# Patient Record
Sex: FEMALE | Race: WHITE | NOT HISPANIC OR LATINO | URBAN - METROPOLITAN AREA
[De-identification: names, ages, dates, MRNs, and addresses within clinical notes are randomized per-mention and may not be internally consistent; named-entity substitution may affect disease eponyms.]

---

## 2020-07-22 ENCOUNTER — ON CAMPUS - OUTPATIENT (OUTPATIENT)
Dept: URBAN - METROPOLITAN AREA HOSPITAL 77 | Facility: HOSPITAL | Age: 70
End: 2020-07-22
Payer: MEDICARE

## 2020-07-22 DIAGNOSIS — K63.5 POLYP OF COLON: ICD-10-CM

## 2020-07-22 DIAGNOSIS — Z85.038 PERSONAL HISTORY OF OTHER MALIGNANT NEOPLASM OF LARGE INTEST: ICD-10-CM

## 2020-07-22 PROCEDURE — 45385 COLONOSCOPY W/LESION REMOVAL: CPT | Performed by: INTERNAL MEDICINE

## 2021-12-09 ENCOUNTER — OFFICE VISIT (OUTPATIENT)
Dept: FAMILY MEDICINE CLINIC | Facility: CLINIC | Age: 71
End: 2021-12-09

## 2021-12-09 VITALS
TEMPERATURE: 97.1 F | RESPIRATION RATE: 18 BRPM | OXYGEN SATURATION: 96 % | HEART RATE: 88 BPM | DIASTOLIC BLOOD PRESSURE: 65 MMHG | HEIGHT: 65 IN | SYSTOLIC BLOOD PRESSURE: 125 MMHG | WEIGHT: 170 LBS | BODY MASS INDEX: 28.32 KG/M2

## 2021-12-09 DIAGNOSIS — E11.9 DM TYPE 2, GOAL HBA1C < 7% (HCC): ICD-10-CM

## 2021-12-09 DIAGNOSIS — N32.81 OAB (OVERACTIVE BLADDER): Primary | ICD-10-CM

## 2021-12-09 DIAGNOSIS — M54.42 CHRONIC LEFT-SIDED LOW BACK PAIN WITH LEFT-SIDED SCIATICA: ICD-10-CM

## 2021-12-09 DIAGNOSIS — M51.36 DDD (DEGENERATIVE DISC DISEASE), LUMBAR: ICD-10-CM

## 2021-12-09 DIAGNOSIS — G89.29 CHRONIC LEFT-SIDED LOW BACK PAIN WITH LEFT-SIDED SCIATICA: ICD-10-CM

## 2021-12-09 DIAGNOSIS — R73.03 PREDIABETES: ICD-10-CM

## 2021-12-09 DIAGNOSIS — L98.9 SKIN LESION OF FACE: ICD-10-CM

## 2021-12-09 DIAGNOSIS — E03.9 ACQUIRED HYPOTHYROIDISM: ICD-10-CM

## 2021-12-09 DIAGNOSIS — I10 HTN, GOAL BELOW 130/80: ICD-10-CM

## 2021-12-09 DIAGNOSIS — R41.89 COGNITIVE DECLINE: ICD-10-CM

## 2021-12-09 PROBLEM — E78.5 DYSLIPIDEMIA: Status: ACTIVE | Noted: 2017-10-11

## 2021-12-09 PROBLEM — M81.0 OSTEOPOROSIS: Status: ACTIVE | Noted: 2020-05-26

## 2021-12-09 PROBLEM — J30.89 ENVIRONMENTAL AND SEASONAL ALLERGIES: Status: ACTIVE | Noted: 2019-10-11

## 2021-12-09 PROBLEM — E11.42 DIABETIC PERIPHERAL NEUROPATHY: Status: ACTIVE | Noted: 2020-02-14

## 2021-12-09 PROBLEM — M54.17 LUMBOSACRAL RADICULOPATHY: Status: ACTIVE | Noted: 2020-02-14

## 2021-12-09 PROBLEM — G25.81 RESTLESS LEG SYNDROME: Status: ACTIVE | Noted: 2018-09-21

## 2021-12-09 PROBLEM — K21.9 GASTRIC REFLUX: Status: ACTIVE | Noted: 2017-03-27

## 2021-12-09 PROCEDURE — 84439 ASSAY OF FREE THYROXINE: CPT | Performed by: FAMILY MEDICINE

## 2021-12-09 PROCEDURE — 80053 COMPREHEN METABOLIC PANEL: CPT | Performed by: FAMILY MEDICINE

## 2021-12-09 PROCEDURE — 99204 OFFICE O/P NEW MOD 45 MIN: CPT | Performed by: FAMILY MEDICINE

## 2021-12-09 PROCEDURE — 36415 COLL VENOUS BLD VENIPUNCTURE: CPT | Performed by: FAMILY MEDICINE

## 2021-12-09 PROCEDURE — 83036 HEMOGLOBIN GLYCOSYLATED A1C: CPT | Performed by: FAMILY MEDICINE

## 2021-12-09 PROCEDURE — 84443 ASSAY THYROID STIM HORMONE: CPT | Performed by: FAMILY MEDICINE

## 2021-12-09 RX ORDER — OMEPRAZOLE 40 MG/1
40 CAPSULE, DELAYED RELEASE ORAL DAILY
COMMUNITY
Start: 2021-11-14 | End: 2021-12-09 | Stop reason: SDUPTHER

## 2021-12-09 RX ORDER — ROPINIROLE 0.5 MG/1
0.5 TABLET, FILM COATED ORAL
COMMUNITY
Start: 2021-11-14 | End: 2022-02-21 | Stop reason: SDUPTHER

## 2021-12-09 RX ORDER — ASPIRIN 81 MG/1
81 TABLET ORAL DAILY
COMMUNITY

## 2021-12-09 RX ORDER — MEMANTINE HYDROCHLORIDE 5 MG/1
5 TABLET ORAL 2 TIMES DAILY
Qty: 60 TABLET | Refills: 1 | Status: SHIPPED | OUTPATIENT
Start: 2021-12-09 | End: 2022-01-17 | Stop reason: SDUPTHER

## 2021-12-09 RX ORDER — VENLAFAXINE HYDROCHLORIDE 150 MG/1
150 CAPSULE, EXTENDED RELEASE ORAL 2 TIMES DAILY
COMMUNITY
Start: 2021-11-14 | End: 2022-02-21 | Stop reason: SDUPTHER

## 2021-12-09 RX ORDER — OMEPRAZOLE 40 MG/1
40 CAPSULE, DELAYED RELEASE ORAL DAILY
Qty: 90 CAPSULE | Refills: 1 | Status: SHIPPED | OUTPATIENT
Start: 2021-12-09 | End: 2022-02-21 | Stop reason: SDUPTHER

## 2021-12-09 RX ORDER — RAMIPRIL 2.5 MG/1
2.5 CAPSULE ORAL DAILY
COMMUNITY
Start: 2021-12-03 | End: 2022-02-21 | Stop reason: SDUPTHER

## 2021-12-09 RX ORDER — AMLODIPINE BESYLATE 10 MG/1
10 TABLET ORAL DAILY
COMMUNITY
Start: 2021-11-14 | End: 2022-02-21 | Stop reason: SDUPTHER

## 2021-12-09 RX ORDER — LEVOTHYROXINE SODIUM 137 UG/1
137 TABLET ORAL DAILY
COMMUNITY
Start: 2021-11-14 | End: 2022-01-17 | Stop reason: SDUPTHER

## 2021-12-09 RX ORDER — GABAPENTIN ENACARBIL 300 MG/1
1 TABLET, EXTENDED RELEASE ORAL DAILY
Qty: 10 TABLET | Refills: 0 | COMMUNITY
Start: 2021-12-09 | End: 2021-12-09 | Stop reason: SDUPTHER

## 2021-12-09 RX ORDER — MULTIVIT-MIN/IRON/FOLIC ACID/K 18-600-40
2000 CAPSULE ORAL DAILY
COMMUNITY

## 2021-12-09 RX ORDER — LANOLIN ALCOHOL/MO/W.PET/CERES
1000 CREAM (GRAM) TOPICAL DAILY
COMMUNITY

## 2021-12-09 RX ORDER — HYDROXYZINE HYDROCHLORIDE 10 MG/1
10 TABLET, FILM COATED ORAL DAILY
COMMUNITY
Start: 2021-11-14 | End: 2022-02-21 | Stop reason: SDUPTHER

## 2021-12-09 RX ORDER — CLOTRIMAZOLE AND BETAMETHASONE DIPROPIONATE 10; .64 MG/G; MG/G
1 CREAM TOPICAL 2 TIMES DAILY
Status: ON HOLD | COMMUNITY
End: 2022-04-15

## 2021-12-09 RX ORDER — SIMVASTATIN 20 MG
20 TABLET ORAL DAILY
COMMUNITY
Start: 2021-11-14 | End: 2022-02-21

## 2021-12-09 RX ORDER — GABAPENTIN ENACARBIL 300 MG/1
1 TABLET, EXTENDED RELEASE ORAL DAILY
Qty: 10 TABLET | Refills: 0 | COMMUNITY
Start: 2021-12-09 | End: 2022-01-17 | Stop reason: ALTCHOICE

## 2021-12-09 RX ORDER — CLONIDINE HYDROCHLORIDE 0.2 MG/1
0.2 TABLET ORAL DAILY
COMMUNITY
Start: 2021-11-14 | End: 2022-02-21 | Stop reason: SDUPTHER

## 2021-12-09 NOTE — PROGRESS NOTES
Venipuncture performed in R ARM by RT Carol  with good hemostasis. Patient tolerated well. 12/09/21 Ees Peters, RT

## 2021-12-09 NOTE — PROGRESS NOTES
Subjective   Radha Barbour is a 70 y.o. female.     Chief Complaint   Patient presents with   • Establish Care   • Leg Pain       History of Present Illness   The patient provided verbal consent to the use of [facility] services during today's visit.     The patient presents today to establish care.     She is taking medication as prescribed for blood pressure control, which is stable.    The patient is taking thyroid medication as prescribed. She states that her levels were last checked in 04/2021 with Dr. Prieto. Her kidney function, liver function and thyroid levels were all within normal ranges.     She reports concern for issues with her kidneys and states that she does not urinate often. She notes urinating approximately 3 times throughout the day and 3 times throughout the night. She states these symptoms started recently, within the last year. The patient believes that she saw a urologist several years ago to check bladder function.     The patient reports a history of prediabetes. Her A1c has been as high as >7.0 in 10/2020. She is not taking any diabetic medication at this time. She has never taken metformin in the past. She denies any history of glaucoma or cataracts. She undergoes yearly eye exams but she is unable to recall the name of the eye clinic.     She has a history of osteoporosis and is not taking any medications. The patient obtained a DEXA bone density scan in 05/2020, which detected osteoporosis in the left arm and hip.     The patient is taking Atarax and venlafaxine for anxiety. She reports to be doing well on the combination of medications.     She has restless leg syndrome and is taking Requip as prescribed with improvement in symptoms.     The patient utilizes hydroxyzine cream as needed for itching. She localizes a rash and states that she scratched her eyebrow off.      She reports a history of colon cancer and states that her most recent colonoscopy was done this year. The patient  underwent surgical intervention, however she is unable to recall the name of her surgeon at this time. She follows up with the [facility] as directed. There is a history of colon cancer including her mother. She is requesting a refill of omeprazole today for acid reflux symptoms. She denies any abdominal pain.     The patient reports experiencing forgetfulness and memory loss issues. She has not been evaluated for this in the past. She is doing cross word puzzles at home. She denies getting lost. The patient does not read or craft regularly but does report watching movies with her sister. There is a family history of dementia including her mother, who took Aripcept with improvement. Her mother was 84 years old when she passed away.     She tested positive for Covid virus in 02/2021. She was not hospitalized for Covid virus. She obtained her Covid vaccines through [facility] but has not received a booster Covid vaccine yet.     She complains of chronic back pain and sciatica symptoms. She describes being limited due to her pain and experiences worsening pain with prolonged standing. She complains of back pain radiating pain down her lower extremity. The patient has a surgical history of undergoing a low back surgery in 2010 however, she is unable to recall the name of her surgeon at [facility]. She reports improvement in pain postoperatively but complains of continued pain. She was taking pain medication prior to undergoing surgical intervention however, she is no longer taking any pain medication at this time.     The patient reports being evaluated in the [facility] ER once this year for weakness and other symptoms. She does not actively see a gynecologist. Her last mammogram was in 08/2021 in [facility], which was normal. She obtains yearly mammograms. The patient retired in 2004 from [employer]. Her  passed away 10 years ago and she has since moved in with her  sister. She has 2 adult children who live nearby. She does not have advanced directives in place at this time. The patient has been seen and treated at [facility] in [place]. The patient reports overeating and states she is utilizing CBD gummies regularly with improvement. She reports CBD gummies curbing her appetite. The patient quit smoking approximately 25 years ago.     Of note, she reports falling out of her bed and hitting her face approximately 2 years ago. She describes acquiring ecchymosis to her face but denies any other injuries. The patient was not evaluated in the ER at the time of injury.       The following portions of the patient's history were reviewed and updated as appropriate: allergies, current medications, past family history, past medical history, past social history, past surgical history and problem list.    Past Medical History:   Diagnosis Date   • B12 deficiency 7/8/2016    Last Assessment & Plan:  Formatting of this note might be different from the original.  Compliant and controlled with current medication B 12 supplements . Lab today B 12   • Back pain, chronic 7/8/2016    Last Assessment & Plan:  Formatting of this note might be different from the original. Will refer to PT   • Depression 7/8/2016    Last Assessment & Plan:  Formatting of this note might be different from the original.  Compliant and controlled with current medication   • Diabetic peripheral neuropathy (HCC) 2/14/2020    Last Assessment & Plan:  Formatting of this note might be different from the original. Stable   • DM type 2, goal HbA1c < 7% (HCC) 3/27/2017    Last Assessment & Plan:  Formatting of this note might be different from the original.  Compliant and controlled with current diet.llabs today A1c   • Dyslipidemia 10/11/2017    Last Assessment & Plan:  Formatting of this note might be different from the original.  Compliant and controlled with current medication/statin therapy/labs today   •  Environmental and seasonal allergies 10/11/2019   • Gastric reflux 3/27/2017   • HTN, goal below 130/80 2/10/2016    Last Assessment & Plan:  Formatting of this note might be different from the original. Compliant and controlled with current medication ramipril /labs today cmp to check renal function and electrolytes   • Hypothyroidism 2014    Last Assessment & Plan:  Formatting of this note might be different from the original. TSH low reduce levothyroxine.  Will check TSH today.   • Insomnia 2015   • Lumbosacral radiculopathy 2020    Last Assessment & Plan:  Formatting of this note might be different from the original.  Compliant and controlled with current medication Muscle relaxer   • Osteoporosis 2020    Formatting of this note is different from the original. RADIOLOGY REPORT   FACILITY:  [facility] UNIT/AGE/GENDER: [id number]  OP      AGE:69 Y          SEX:F PATIENT NAME/:  GM DERAS    1950 UNIT NUMBER:  [id number] ACCOUNT NUMBER:  [id number] ACCESSION NUMBER:  [id number]     EXAMINATION: Bone densitometry of multiple sites, lumbar spine, left hip and distal   • Restless leg syndrome 2018    Last Assessment & Plan:  Formatting of this note might be different from the original.  Compliant and controlled with current medication requip       Past Surgical History:   Procedure Laterality Date   • COLON SURGERY  2006       Family History   Problem Relation Age of Onset   • Arthritis Mother    • Colon cancer Mother    • Thyroid disease Mother        Review of Systems  All system were reviewed are are negative otherwise what is listed in the HPI.    Objective   Physical Exam  General Appearance: alert, oriented x 3, no acute distress.  Skin: warm and dry.   HEENT: Atraumatic.  pupils round and reactive to light and accommodation, oral mucosa pink and moist.  Nares patent without epistaxis.  External auditory canals are patent tympanic membranes  intact.  Neck: supple, no JVD, trachea midline.  No thyromegaly  Lungs: CTA, unlabored breathing effort.  Heart: RRR, normal S1 and S2, no S3, no rub.  Abdomen: soft, non-tender, no palpable bladder, present bowel sounds to auscultation ×4.  No guarding or rigidity.  Extremities: no clubbing, cyanosis or edema.  Good range of motion actively and passively.  Symmetric muscle strength and development  Neuro: normal speech and mental status.  Cranial nerves II through XII intact.  No anosmia. DTR 2+; proprioception intact.  No focal motor/sensory deficits.    Vitals:    12/09/21 1037   BP: 125/65   Pulse: 88   Resp: 18   Temp: 97.1 °F (36.2 °C)   SpO2: 96%     Body mass index is 28.29 kg/m².    Hemoglobin A1C   Date Value Ref Range Status   04/29/2021 6.3 (H) 4.3 - 5.6 % Final     Comment:     (note)  A1C% Reference Range:  4.3 - 5.6  Normal range  5.7 - 6.4  Pre-diabetic -increased risk for developing diabetes mellitus.  >=6.5      Diabetic -diagnostic of diabetes mellitus.     Note: For diagnosis of diabetes in individuals without unequivocal   hyperglycemia, results should be confirmed by repeat testing.  Patients with conditions that shorten erythrocyte survival, such as  recovery from acute blood loss, hemolytic anemia, kidney disease,  or the presence of unstable hemoglobins like HbSS, HbCC, and HbSC  may yield falsely decreased HbA1c test results. Iron deficiency may  yield falsely increased HbA1c test results.   02/02/2021 7.0 (H) 4.3 - 5.6 % Final     Comment:     (note)  A1C% Reference Range:  4.3 - 5.6  Normal range  5.7 - 6.4  Pre-diabetic -increased risk for developing diabetes mellitus.  >=6.5      Diabetic -diagnostic of diabetes mellitus.     Note: For diagnosis of diabetes in individuals without unequivocal   hyperglycemia, results should be confirmed by repeat testing.  Patients with conditions that shorten erythrocyte survival, such as  recovery from acute blood loss, hemolytic anemia, kidney  disease,  or the presence of unstable hemoglobins like HbSS, HbCC, and HbSC  may yield falsely decreased HbA1c test results. Iron deficiency may  yield falsely increased HbA1c test results.   10/20/2020 7.1 (H) 4.3 - 5.6 % Final     Comment:     (note)  A1C% Reference Range:  4.3 - 5.6  Normal range  5.7 - 6.4  Pre-diabetic -increased risk for developing diabetes mellitus.  >=6.5      Diabetic -diagnostic of diabetes mellitus.     Note: For diagnosis of diabetes in individuals without unequivocal   hyperglycemia, results should be confirmed by repeat testing.  Patients with conditions that shorten erythrocyte survival, such as  recovery from acute blood loss, hemolytic anemia, kidney disease,  or the presence of unstable hemoglobins like HbSS, HbCC, and HbSC  may yield falsely decreased HbA1c test results. Iron deficiency may  yield falsely increased HbA1c test results.     LDL Cholesterol    Date Value Ref Range Status   06/09/2020 225 (H) 0 - 100 mg/dL Final   02/13/2020 60 0 - 100 mg/dL Final   06/04/2019 71 0 - 100 mg/dL Final     TSH   Date Value Ref Range Status   04/29/2021 1.850 0.27 - 4.20 u(iU)/mL Final   02/02/2021 0.983 0.27 - 4.20 u(iU)/mL Final   10/20/2020 0.096 (L) 0.27 - 4.20 u(iU)/mL Final     No results found for this or any previous visit.    Assessment/Plan   Diagnoses and all orders for this visit:    1. OAB (overactive bladder) (Primary)    2. Prediabetes  -     Hemoglobin A1c  -     Comprehensive metabolic panel; Future    3. Acquired hypothyroidism  -     TSH+Free T4    4. HTN, goal below 130/80    5. DM type 2, goal HbA1c < 7% (HCC)    6. DDD (degenerative disc disease), lumbar    7. Chronic left-sided low back pain with left-sided sciatica  -     Gabapentin Enacarbil ER (Horizant) 300 MG tablet controlled-release; Take 1 tablet by mouth Daily.  Dispense: 10 tablet; Refill: 0    8. Cognitive decline    9. Skin lesion of face  -     Ambulatory Referral to Dermatology    Other orders  -      memantine (Namenda) 5 MG tablet; Take 1 tablet by mouth 2 (Two) Times a Day.  Dispense: 60 tablet; Refill: 1  -     omeprazole (priLOSEC) 40 MG capsule; Take 1 capsule by mouth Daily.  Dispense: 90 capsule; Refill: 1      1. Prediabetes.  Will obtain labs today in office including a CMP and hemoglobin A1c.    2. Hypertension.  Stable. Patient to continue taking medications as prescribed.     3. Degenerative disc disease of the lumbar spine.     4. Chronic left-sided low back pain with left-sided sciatica.  Patient to obtain an x-ray of her back for further evaluation. We discussed a referral to pain management in the future if necessary. A sample of Horizant 300 MG was dispensed to the patient today to begin taking for sciatica pain.     5. Cognitive decline.  Patient to begin taking Namenda 5 MG as prescribed. A prescription was sent to her preferred pharmacy today. We discussed incorporating additional medication as necessary and tolerated.     6. Skin lesion of face.  A referral to dermatology was placed today for further evaluation of her facial lesion.     7. Need for immunization.  Recommend that she obtain a Covid booster vaccine in the near future at a local pharmacy.     Information regarding osteoporosis was provided to the patient today. Will request office notes from the [facility].      Transcribed from ambient dictation for Dr. Ro Sawyer by Swati Ngo.   12/09/2021.   1:42 PM.

## 2021-12-10 ENCOUNTER — TELEPHONE (OUTPATIENT)
Dept: FAMILY MEDICINE CLINIC | Facility: CLINIC | Age: 71
End: 2021-12-10

## 2021-12-10 LAB
ALBUMIN SERPL-MCNC: 4.5 G/DL (ref 3.5–5.2)
ALBUMIN/GLOB SERPL: 1.3 G/DL
ALP SERPL-CCNC: 97 U/L (ref 39–117)
ALT SERPL W P-5'-P-CCNC: 15 U/L (ref 1–33)
ANION GAP SERPL CALCULATED.3IONS-SCNC: 12.6 MMOL/L (ref 5–15)
AST SERPL-CCNC: 16 U/L (ref 1–32)
BILIRUB SERPL-MCNC: 0.2 MG/DL (ref 0–1.2)
BUN SERPL-MCNC: 16 MG/DL (ref 8–23)
BUN/CREAT SERPL: 20.8 (ref 7–25)
CALCIUM SPEC-SCNC: 9.7 MG/DL (ref 8.6–10.5)
CHLORIDE SERPL-SCNC: 102 MMOL/L (ref 98–107)
CO2 SERPL-SCNC: 27.4 MMOL/L (ref 22–29)
CREAT SERPL-MCNC: 0.77 MG/DL (ref 0.57–1)
GFR SERPL CREATININE-BSD FRML MDRD: 74 ML/MIN/1.73
GLOBULIN UR ELPH-MCNC: 3.5 GM/DL
GLUCOSE SERPL-MCNC: 119 MG/DL (ref 65–99)
HBA1C MFR BLD: 6.6 % (ref 3.5–5.6)
POTASSIUM SERPL-SCNC: 4.7 MMOL/L (ref 3.5–5.2)
PROT SERPL-MCNC: 8 G/DL (ref 6–8.5)
SODIUM SERPL-SCNC: 142 MMOL/L (ref 136–145)
T4 FREE SERPL-MCNC: 1.42 NG/DL (ref 0.93–1.7)
TSH SERPL DL<=0.05 MIU/L-ACNC: 0.18 UIU/ML (ref 0.27–4.2)

## 2021-12-10 NOTE — TELEPHONE ENCOUNTER
HUB to share    Xr lumbar shows significant arthritis in lower back,  consider pain management referral for some injections which may hel with back pain    LVM informing pt

## 2021-12-23 ENCOUNTER — TELEPHONE (OUTPATIENT)
Dept: FAMILY MEDICINE CLINIC | Facility: CLINIC | Age: 71
End: 2021-12-23

## 2021-12-28 DIAGNOSIS — M54.42 CHRONIC LEFT-SIDED LOW BACK PAIN WITH LEFT-SIDED SCIATICA: ICD-10-CM

## 2021-12-28 DIAGNOSIS — M51.36 DDD (DEGENERATIVE DISC DISEASE), LUMBAR: Primary | ICD-10-CM

## 2021-12-28 DIAGNOSIS — G89.29 CHRONIC LEFT-SIDED LOW BACK PAIN WITH LEFT-SIDED SCIATICA: ICD-10-CM

## 2022-01-17 ENCOUNTER — OFFICE VISIT (OUTPATIENT)
Dept: FAMILY MEDICINE CLINIC | Facility: CLINIC | Age: 72
End: 2022-01-17

## 2022-01-17 VITALS
HEIGHT: 65 IN | HEART RATE: 78 BPM | BODY MASS INDEX: 29.66 KG/M2 | RESPIRATION RATE: 18 BRPM | SYSTOLIC BLOOD PRESSURE: 153 MMHG | WEIGHT: 178 LBS | DIASTOLIC BLOOD PRESSURE: 62 MMHG | TEMPERATURE: 97.7 F | OXYGEN SATURATION: 96 %

## 2022-01-17 DIAGNOSIS — E11.65 TYPE 2 DIABETES MELLITUS WITH HYPERGLYCEMIA, WITHOUT LONG-TERM CURRENT USE OF INSULIN: ICD-10-CM

## 2022-01-17 DIAGNOSIS — G89.29 CHRONIC LEFT-SIDED LOW BACK PAIN WITH LEFT-SIDED SCIATICA: ICD-10-CM

## 2022-01-17 DIAGNOSIS — E78.49 OTHER HYPERLIPIDEMIA: ICD-10-CM

## 2022-01-17 DIAGNOSIS — R32 URINARY INCONTINENCE, UNSPECIFIED TYPE: Primary | ICD-10-CM

## 2022-01-17 DIAGNOSIS — F41.9 ANXIETY: ICD-10-CM

## 2022-01-17 DIAGNOSIS — I10 PRIMARY HYPERTENSION: ICD-10-CM

## 2022-01-17 DIAGNOSIS — M54.42 CHRONIC LEFT-SIDED LOW BACK PAIN WITH LEFT-SIDED SCIATICA: ICD-10-CM

## 2022-01-17 DIAGNOSIS — G62.9 NEUROPATHY: ICD-10-CM

## 2022-01-17 DIAGNOSIS — E03.9 ACQUIRED HYPOTHYROIDISM: ICD-10-CM

## 2022-01-17 DIAGNOSIS — R41.89 COGNITIVE IMPAIRMENT: ICD-10-CM

## 2022-01-17 DIAGNOSIS — K21.9 GASTROESOPHAGEAL REFLUX DISEASE, UNSPECIFIED WHETHER ESOPHAGITIS PRESENT: ICD-10-CM

## 2022-01-17 PROCEDURE — 99214 OFFICE O/P EST MOD 30 MIN: CPT | Performed by: FAMILY MEDICINE

## 2022-01-17 RX ORDER — LEVOTHYROXINE SODIUM 137 UG/1
137 TABLET ORAL DAILY
Qty: 72 TABLET | Refills: 0 | Status: SHIPPED | OUTPATIENT
Start: 2022-01-17 | End: 2022-03-07 | Stop reason: SDUPTHER

## 2022-01-17 RX ORDER — METFORMIN HYDROCHLORIDE EXTENDED-RELEASE TABLETS 1000 MG/1
1000 TABLET, FILM COATED, EXTENDED RELEASE ORAL
Qty: 30 TABLET | Refills: 2 | Status: SHIPPED | OUTPATIENT
Start: 2022-01-17 | End: 2022-02-21 | Stop reason: SDUPTHER

## 2022-01-17 RX ORDER — MECLIZINE HYDROCHLORIDE 25 MG/1
25 TABLET ORAL 3 TIMES DAILY PRN
COMMUNITY
Start: 2021-12-08 | End: 2022-02-21 | Stop reason: SDUPTHER

## 2022-01-17 RX ORDER — FESOTERODINE FUMARATE 4 MG/1
4 TABLET, FILM COATED, EXTENDED RELEASE ORAL
Qty: 30 TABLET | Refills: 0 | Status: SHIPPED | OUTPATIENT
Start: 2022-01-17 | End: 2022-02-21 | Stop reason: ALTCHOICE

## 2022-01-17 RX ORDER — MEMANTINE HYDROCHLORIDE 5 MG/1
10 TABLET ORAL 2 TIMES DAILY
Qty: 60 TABLET | Refills: 2 | Status: SHIPPED | OUTPATIENT
Start: 2022-01-17 | End: 2022-02-21 | Stop reason: SDUPTHER

## 2022-01-17 RX ORDER — GABAPENTIN 300 MG/1
300 CAPSULE ORAL 3 TIMES DAILY
Qty: 90 CAPSULE | Refills: 0 | Status: SHIPPED | OUTPATIENT
Start: 2022-01-17 | End: 2022-03-15

## 2022-02-16 ENCOUNTER — TELEPHONE (OUTPATIENT)
Dept: FAMILY MEDICINE CLINIC | Facility: CLINIC | Age: 72
End: 2022-02-16

## 2022-02-16 NOTE — TELEPHONE ENCOUNTER
Rx Refill Note  Requested Prescriptions      No prescriptions requested or ordered in this encounter      Last office visit with prescribing clinician: 1/17/2022      Next office visit with prescribing clinician: 2/21/2022     Comprehensive metabolic panel (12/09/2021 11:55)  Hemoglobin A1c (12/09/2021 11:55)  LIPID PANEL (04/29/2021 13:33)         Luci Garcia, RADHA  02/16/22, 13:52 EST     For:  Gabapentin 300mg capsule  Meclizine 25mg tablet  Memantine 5mg tablet  Buproprion HCL 100mg tablet  Toviaz 4mg tablet    To [facility] mail order pharmacy

## 2022-02-21 ENCOUNTER — OFFICE VISIT (OUTPATIENT)
Dept: FAMILY MEDICINE CLINIC | Facility: CLINIC | Age: 72
End: 2022-02-21

## 2022-02-21 VITALS
OXYGEN SATURATION: 97 % | HEIGHT: 65 IN | HEART RATE: 97 BPM | SYSTOLIC BLOOD PRESSURE: 145 MMHG | RESPIRATION RATE: 18 BRPM | BODY MASS INDEX: 29.32 KG/M2 | DIASTOLIC BLOOD PRESSURE: 73 MMHG | WEIGHT: 176 LBS | TEMPERATURE: 97.7 F

## 2022-02-21 DIAGNOSIS — M25.531 WRIST PAIN, RIGHT: ICD-10-CM

## 2022-02-21 DIAGNOSIS — I95.1 ORTHOSTATIC HYPOTENSION: ICD-10-CM

## 2022-02-21 DIAGNOSIS — I10 PRIMARY HYPERTENSION: ICD-10-CM

## 2022-02-21 DIAGNOSIS — R42 VERTIGO: ICD-10-CM

## 2022-02-21 DIAGNOSIS — R60.9 EDEMA, UNSPECIFIED TYPE: ICD-10-CM

## 2022-02-21 DIAGNOSIS — S09.90XD TRAUMATIC INJURY OF HEAD, SUBSEQUENT ENCOUNTER: ICD-10-CM

## 2022-02-21 DIAGNOSIS — E03.9 ACQUIRED HYPOTHYROIDISM: Primary | ICD-10-CM

## 2022-02-21 DIAGNOSIS — E11.9 DM TYPE 2, GOAL HBA1C < 7%: ICD-10-CM
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
## 2022-02-21 DIAGNOSIS — R39.15 URGENCY OF MICTURITION: ICD-10-CM

## 2022-02-21 PROCEDURE — 84439 ASSAY OF FREE THYROXINE: CPT | Performed by: FAMILY MEDICINE

## 2022-02-21 PROCEDURE — 84443 ASSAY THYROID STIM HORMONE: CPT | Performed by: FAMILY MEDICINE

## 2022-02-21 PROCEDURE — 99214 OFFICE O/P EST MOD 30 MIN: CPT | Performed by: FAMILY MEDICINE

## 2022-02-21 PROCEDURE — 36415 COLL VENOUS BLD VENIPUNCTURE: CPT | Performed by: FAMILY MEDICINE

## 2022-02-21 RX ORDER — VENLAFAXINE HYDROCHLORIDE 150 MG/1
150 CAPSULE, EXTENDED RELEASE ORAL 2 TIMES DAILY
Qty: 180 CAPSULE | Refills: 1 | Status: SHIPPED | OUTPATIENT
Start: 2022-02-21 | End: 2022-06-28 | Stop reason: SDUPTHER

## 2022-02-21 RX ORDER — CLONIDINE HYDROCHLORIDE 0.2 MG/1
0.2 TABLET ORAL DAILY
Qty: 90 TABLET | Refills: 1 | Status: SHIPPED | OUTPATIENT
Start: 2022-02-21

## 2022-02-21 RX ORDER — ROPINIROLE 0.5 MG/1
0.5 TABLET, FILM COATED ORAL
Qty: 90 TABLET | Refills: 1 | Status: SHIPPED | OUTPATIENT
Start: 2022-02-21 | End: 2022-03-31 | Stop reason: SDUPTHER

## 2022-02-21 RX ORDER — HYDROXYZINE HYDROCHLORIDE 10 MG/1
10 TABLET, FILM COATED ORAL DAILY
Qty: 90 TABLET | Refills: 1 | Status: SHIPPED | OUTPATIENT
Start: 2022-02-21 | End: 2022-10-27 | Stop reason: SDUPTHER

## 2022-02-21 RX ORDER — RAMIPRIL 2.5 MG/1
2.5 CAPSULE ORAL DAILY
Qty: 90 CAPSULE | Refills: 1 | Status: SHIPPED | OUTPATIENT
Start: 2022-02-21 | End: 2022-03-31 | Stop reason: SDUPTHER

## 2022-02-21 RX ORDER — PRAVASTATIN SODIUM 20 MG
20 TABLET ORAL DAILY
Qty: 90 TABLET | Refills: 1 | Status: SHIPPED | OUTPATIENT
Start: 2022-02-21 | End: 2022-08-31 | Stop reason: SDUPTHER

## 2022-02-21 RX ORDER — METFORMIN HYDROCHLORIDE EXTENDED-RELEASE TABLETS 1000 MG/1
1000 TABLET, FILM COATED, EXTENDED RELEASE ORAL
Qty: 30 TABLET | Refills: 0 | Status: SHIPPED | OUTPATIENT
Start: 2022-02-21 | End: 2022-02-23 | Stop reason: SDUPTHER

## 2022-02-21 RX ORDER — MEMANTINE HYDROCHLORIDE 10 MG/1
10 TABLET ORAL 2 TIMES DAILY
Qty: 180 TABLET | Refills: 1 | Status: SHIPPED | OUTPATIENT
Start: 2022-02-21 | End: 2022-10-27 | Stop reason: SDUPTHER

## 2022-02-21 RX ORDER — MECLIZINE HYDROCHLORIDE 25 MG/1
25 TABLET ORAL 3 TIMES DAILY PRN
Qty: 180 TABLET | Refills: 0 | Status: SHIPPED | OUTPATIENT
Start: 2022-02-21 | End: 2022-03-15

## 2022-02-21 RX ORDER — OMEPRAZOLE 40 MG/1
40 CAPSULE, DELAYED RELEASE ORAL DAILY
Qty: 90 CAPSULE | Refills: 1 | Status: SHIPPED | OUTPATIENT
Start: 2022-02-21 | End: 2022-06-28 | Stop reason: SDUPTHER

## 2022-02-21 RX ORDER — AMLODIPINE BESYLATE 10 MG/1
10 TABLET ORAL DAILY
Qty: 90 TABLET | Refills: 1 | Status: SHIPPED | OUTPATIENT
Start: 2022-02-21 | End: 2022-02-25

## 2022-02-21 RX ORDER — BUPROPION HYDROCHLORIDE 100 MG/1
100 TABLET ORAL 2 TIMES DAILY
COMMUNITY
Start: 2021-12-22 | End: 2022-03-15 | Stop reason: SDUPTHER

## 2022-02-22 ENCOUNTER — TELEPHONE (OUTPATIENT)
Dept: FAMILY MEDICINE CLINIC | Facility: CLINIC | Age: 72
End: 2022-02-22

## 2022-02-22 LAB
T4 FREE SERPL-MCNC: 1.05 NG/DL (ref 0.93–1.7)
TSH SERPL DL<=0.05 MIU/L-ACNC: 4.92 UIU/ML (ref 0.27–4.2)

## 2022-02-22 NOTE — TELEPHONE ENCOUNTER
HUB to read    PA was sent for metFORMIN HCl ER (OSM) 1000MG er  Waiting on the outcome from insurance.    Preferred alternatives:  METFORMIN,METFORMIN HCL  MG TABLET,METFORMIN HCL  MG TABLETNONFORMULARY;FOR DISCOUNT,USE [id number] prefers [facility] - PREFERRED PRODUCT REQUIRED

## 2022-02-22 NOTE — TELEPHONE ENCOUNTER
Pharmacy Name:  [facility] - [facility] [address] - [phone]  - [phone]     Pharmacy representative name: HARSH    Pharmacy representative phone number: [phone]    What medication are you calling in regards to: TOVIAZ 4 MG    What question does the pharmacy have: PHARMACY HAS REQUESTED A REFILL OF THIS MEDICATION.  HUB COULD NOT FIND IT ON THE MEDICATION LIST BUT THE PHARMACY SAYS THEY HAVE FILLED THIS BEFORE.  PLEASE ADVISE     Who is the provider that prescribed the medication: DR DORSEY

## 2022-02-22 NOTE — TELEPHONE ENCOUNTER
HUB to read    PA for METFORMIN HCl ER (OSM) 1000MG was denied.     Insurance requires the patient to have tried Glucophage XR tablet and regular Metformin tablet.         The drug you asked for is non-formulary (not on [facility] list of preferred drugs). Before the drug can be covered, we need more information. Please ask your prescriber to explain to [facility] why the preferred drugs have not worked for your medical condition and/or would have bad side effects. If you have not tried the preferred drugs, including metformin ER tablet extended release 24 hr( generic Glucophage XR), and metformin tablet, please talk to your health care provider about prescribing one of these for you. Some preferred drugs may require an additional review and approval by [facility]. Additionally, some alternative drugs listed may be the same drugs with different strengths or forms. Under certain cases, [facility] may only require trial and failure of one strength or form of that drug. This determination was based on the [facility] [employer] Non-Formulary Exceptions Coverage Policy.

## 2022-02-23 DIAGNOSIS — E11.9 DM TYPE 2, GOAL HBA1C < 7%: ICD-10-CM

## 2022-02-23 RX ORDER — METFORMIN HYDROCHLORIDE EXTENDED-RELEASE TABLETS 1000 MG/1
1000 TABLET, FILM COATED, EXTENDED RELEASE ORAL
Qty: 90 TABLET | Refills: 1 | Status: SHIPPED | OUTPATIENT
Start: 2022-02-23 | End: 2022-02-23 | Stop reason: CLARIF

## 2022-02-23 RX ORDER — METFORMIN HYDROCHLORIDE EXTENDED-RELEASE TABLETS 1000 MG/1
1000 TABLET, FILM COATED, EXTENDED RELEASE ORAL
Qty: 90 TABLET | Refills: 1 | Status: SHIPPED | OUTPATIENT
Start: 2022-02-23 | End: 2022-03-01 | Stop reason: CLARIF

## 2022-02-24 ENCOUNTER — TELEPHONE (OUTPATIENT)
Dept: FAMILY MEDICINE CLINIC | Facility: CLINIC | Age: 72
End: 2022-02-24

## 2022-02-24 NOTE — TELEPHONE ENCOUNTER
Caller: Radha Barbour    Relationship to patient: Self    Best call back number: [phone]    Patient is needing: PATIENT CALLED IN AND SAID SOMEONE FROM THE OFFICE CALLED HER YESTERDAY EVENING BUT SHE DOES NOT KNOW WHO. SHE SAID IT WAS NOT ABOUT THE MEDICATION, BUT WONDERED IF HER BLOOD WORK RESULTS FROM 2/21/22 WHERE IN? PLEASE CALL PATIENT AND ADVISE

## 2022-02-25 ENCOUNTER — CONSULT (OUTPATIENT)
Dept: CARDIOLOGY | Facility: CLINIC | Age: 72
End: 2022-02-25

## 2022-02-25 VITALS
SYSTOLIC BLOOD PRESSURE: 142 MMHG | DIASTOLIC BLOOD PRESSURE: 60 MMHG | WEIGHT: 179 LBS | HEIGHT: 65 IN | HEART RATE: 61 BPM | OXYGEN SATURATION: 97 % | BODY MASS INDEX: 29.82 KG/M2

## 2022-02-25 DIAGNOSIS — R42 DIZZINESS: ICD-10-CM

## 2022-02-25 DIAGNOSIS — I20.9 ANGINA PECTORIS: ICD-10-CM

## 2022-02-25 DIAGNOSIS — I95.1 AUTONOMIC ORTHOSTATIC HYPOTENSION: Primary | ICD-10-CM

## 2022-02-25 DIAGNOSIS — E11.42 DIABETIC PERIPHERAL NEUROPATHY: ICD-10-CM

## 2022-02-25 DIAGNOSIS — I10 PRIMARY HYPERTENSION: ICD-10-CM

## 2022-02-25 DIAGNOSIS — E78.5 DYSLIPIDEMIA: ICD-10-CM

## 2022-02-25 DIAGNOSIS — R00.2 PALPITATIONS: ICD-10-CM

## 2022-02-25 DIAGNOSIS — R06.02 SOB (SHORTNESS OF BREATH): ICD-10-CM

## 2022-02-25 PROCEDURE — 93000 ELECTROCARDIOGRAM COMPLETE: CPT | Performed by: INTERNAL MEDICINE

## 2022-02-25 PROCEDURE — 99214 OFFICE O/P EST MOD 30 MIN: CPT | Performed by: INTERNAL MEDICINE

## 2022-02-25 RX ORDER — AMLODIPINE BESYLATE 5 MG/1
5 TABLET ORAL DAILY
Qty: 90 TABLET | Refills: 3 | COMMUNITY
Start: 2022-02-25 | End: 2022-06-02

## 2022-02-25 NOTE — PROGRESS NOTES
Cardiology Office Visit      Encounter Date:  02/25/2022    Patient ID:   Radha Barbour is a 71 y.o. female.    Reason For Followup:  Dizziness  Orthostasis  Hypertension  Frequent falls    Brief Clinical History:  Dear Ro Smith MD    I had the pleasure of seeing Radha Barbour today. As you are well aware, this is a 71 y.o. female significant for history of hypertension hyperlipidemia hypothyroidism history of diabetes presented with symptoms of dizziness fatigue weakness frequent multiple falls    Interval History:  Prior history of dizziness but progressively getting worse  Worsening symptoms of orthostasis  Prior history of vertigo but that is resolved now she is having more lightheadedness and dizziness with frequent falls  Unsteady gait  Currently on same medications for blood pressure for last several years including high-dose Norvasc clonidine and low-dose ramipril  Orthostatic dizziness and presyncope  Frequent falls  Lower extremity edema  Shortness of breath and dyspnea on exertion  No palpitations  No prior cardiac work-up  Assessment & Plan    Impressions:  Dizziness  Orthostasis  Hypertension  Frequent falls  Diabetes mellitus  Hypothyroidism  Lower extremity edema  Shortness of breath  Dyspnea on exertion  Hyperlipidemia    Recommendations:  Positive orthostatic hypotension  Multiple complaints  Decrease the dose of Norvasc from 10 mg to 5 mg p.o. once a day  Eventually consider stopping the clonidine and switching to other medications if needed  Risk benefits and alternatives for treatment for orthostatic hypotension reviewed and discussed with patient  First try conservative measures including MARGAUX hoses and abdominal binder  Check an echocardiogram to assess the LV systolic function rule out any significant cardiomyopathy contributing to patient's symptoms of shortness of breath dyspnea on exertion and orthostatic hypotension  Schedule patient for a myocardial perfusion study for  "further stratification for symptoms of dyspnea on exertion  Patient is currently being evaluated by primary care physician with a CT of the head for further evaluation  Prior ENT evaluation with no significant abnormality palpation  Discussed with patient and family  Prior work-up and labs reviewed and discussed with the patient  Follow-up in office in 2 months    Objective:    Vitals:  Vitals:    02/25/22 1059   BP: 142/60   Pulse: 61   SpO2: 97%   Weight: 81.2 kg (179 lb)   Height: 165.1 cm (65\")       Physical Exam:    General: Alert, cooperative, no distress, appears stated age  Head:  Normocephalic, atraumatic, mucous membranes moist  Eyes:  Conjunctiva/corneas clear, EOM's intact     Neck:  Supple,  no adenopathy;      Lungs: Clear to auscultation bilaterally, no wheezes rhonchi rales are noted  Chest wall: No tenderness  Heart::  Regular rate and rhythm, S1 and S2 normal, no murmur, rub or gallop  Abdomen: Soft, non-tender, nondistended bowel sounds active  Extremities: No cyanosis, clubbing, or edema  Pulses: 2+ and symmetric all extremities  Skin:  No rashes or lesions  Neuro/psych: A&O x3. CN II through XII are grossly intact with appropriate affect      Allergies:  No Known Allergies    Medication Review:     Current Outpatient Medications:   •  aspirin 81 MG EC tablet, Take 81 mg by mouth Daily., Disp: , Rfl:   •  buPROPion (WELLBUTRIN) 100 MG tablet, 100 mg 2 (Two) Times a Day., Disp: , Rfl:   •  CBD oil (cannabidiol) capsule, Take 25 each by mouth Daily. gummies, Disp: , Rfl:   •  cloNIDine (CATAPRES) 0.2 MG tablet, Take 1 tablet by mouth Daily., Disp: 90 tablet, Rfl: 1  •  gabapentin (NEURONTIN) 300 MG capsule, Take 1 capsule by mouth 3 (Three) Times a Day., Disp: 90 capsule, Rfl: 0  •  hydrOXYzine (ATARAX) 10 MG tablet, Take 1 tablet by mouth Daily., Disp: 90 tablet, Rfl: 1  •  levothyroxine (SYNTHROID, LEVOTHROID) 137 MCG tablet, Take 1 tablet by mouth Daily. Take 1 tablet daily Sunday to fri, Disp: " 72 tablet, Rfl: 0  •  meclizine (ANTIVERT) 25 MG tablet, Take 1 tablet by mouth 3 (Three) Times a Day As Needed for Dizziness., Disp: 180 tablet, Rfl: 0  •  memantine (Namenda) 10 MG tablet, Take 1 tablet by mouth 2 (Two) Times a Day., Disp: 180 tablet, Rfl: 1  •  Mirabegron ER (Myrbetriq) 25 MG tablet sustained-release 24 hour 24 hr tablet, Take 1 tablet by mouth Daily., Disp: 90 tablet, Rfl: 1  •  Misc Natural Products (ENERGY SUPPORT PO), Take 1 tablet/day by mouth. , Disp: , Rfl:   •  Nutritional Supplements (KETO PO), Take 2 tablet/day by mouth., Disp: , Rfl:   •  omeprazole (priLOSEC) 40 MG capsule, Take 1 capsule by mouth Daily., Disp: 90 capsule, Rfl: 1  •  ramipril (ALTACE) 2.5 MG capsule, Take 1 capsule by mouth Daily., Disp: 90 capsule, Rfl: 1  •  rOPINIRole (REQUIP) 0.5 MG tablet, Take 1 tablet by mouth every night at bedtime., Disp: 90 tablet, Rfl: 1  •  venlafaxine XR (EFFEXOR-XR) 150 MG 24 hr capsule, Take 1 capsule by mouth 2 (Two) Times a Day., Disp: 180 capsule, Rfl: 1  •  vitamin B-12 (CYANOCOBALAMIN) 1000 MCG tablet, Take 1,000 mcg by mouth Daily., Disp: , Rfl:   •  Vitamin D, Cholecalciferol, 50 MCG (2000 UT) capsule, Take 2,000 Units by mouth Daily., Disp: , Rfl:   •  amLODIPine (NORVASC) 5 MG tablet, Take 1 tablet by mouth Daily., Disp: 90 tablet, Rfl: 3  •  clotrimazole-betamethasone (LOTRISONE) 1-0.05 % cream, Apply 1 application topically to the appropriate area as directed 2 (Two) Times a Day. Apply above R eyebrow PRN rash, Disp: , Rfl:   •  metFORMIN (FORTAMET) 1000 MG (OSM) 24 hr tablet, Take 1 tablet by mouth Daily With Breakfast., Disp: 90 tablet, Rfl: 1  •  pravastatin (Pravachol) 20 MG tablet, Take 1 tablet by mouth Daily., Disp: 90 tablet, Rfl: 1    Family History:  Family History   Problem Relation Age of Onset   • Arthritis Mother    • Colon cancer Mother    • Thyroid disease Mother    • Stroke Mother        Past Medical History:  Past Medical History:   Diagnosis Date   •  B12 deficiency 7/8/2016    Last Assessment & Plan:  Formatting of this note might be different from the original.  Compliant and controlled with current medication B 12 supplements . Lab today B 12   • Back pain, chronic 7/8/2016    Last Assessment & Plan:  Formatting of this note might be different from the original. Will refer to PT   • Depression 7/8/2016    Last Assessment & Plan:  Formatting of this note might be different from the original.  Compliant and controlled with current medication   • Diabetic peripheral neuropathy (HCC) 2/14/2020    Last Assessment & Plan:  Formatting of this note might be different from the original. Stable   • DM type 2, goal HbA1c < 7% (HCC) 3/27/2017    Last Assessment & Plan:  Formatting of this note might be different from the original.  Compliant and controlled with current diet.llabs today A1c   • Dyslipidemia 10/11/2017    Last Assessment & Plan:  Formatting of this note might be different from the original.  Compliant and controlled with current medication/statin therapy/labs today   • Environmental and seasonal allergies 10/11/2019   • Gastric reflux 3/27/2017   • HTN, goal below 130/80 2/10/2016    Last Assessment & Plan:  Formatting of this note might be different from the original. Compliant and controlled with current medication ramipril /labs today cmp to check renal function and electrolytes   • Hyperlipidemia    • Hypothyroidism 5/20/2014    Last Assessment & Plan:  Formatting of this note might be different from the original. TSH low reduce levothyroxine.  Will check TSH today.   • Insomnia 1/16/2015   • Lumbosacral radiculopathy 2/14/2020    Last Assessment & Plan:  Formatting of this note might be different from the original.  Compliant and controlled with current medication Muscle relaxer   • Osteoporosis 5/26/2020    Formatting of this note is different from the original. RADIOLOGY REPORT   FACILITY:  [facility]/AGE/GENDER: MARTÍNCMACLRK  OP       AGE:69 Y          SEX:F PATIENT NAME/:  GM DERAS    1950 UNIT NUMBER:  [id number] ACCOUNT NUMBER:  [id number] ACCESSION NUMBER:  [id number]     EXAMINATION: Bone densitometry of multiple sites, lumbar spine, left hip and distal   • Restless leg syndrome 2018    Last Assessment & Plan:  Formatting of this note might be different from the original.  Compliant and controlled with current medication requip       Past surgical History:  Past Surgical History:   Procedure Laterality Date   • COLON SURGERY         Social History:  Social History     Socioeconomic History   • Marital status:    Tobacco Use   • Smoking status: Former Smoker     Packs/day: 2.00     Years: 20.00     Pack years: 40.00     Types: Cigarettes     Quit date:      Years since quittin.1   • Smokeless tobacco: Never Used   Vaping Use   • Vaping Use: Never used   Substance and Sexual Activity   • Alcohol use: Yes     Comment: socially   • Drug use: Never     Comment: CBD oils   • Sexual activity: Defer       Review of Systems:  The following systems were reviewed as they relate to the cardiovascular system: Constitutional, Eyes, ENT, Cardiovascular, Respiratory, Gastrointestinal, Integumentary, Neurological, Psychiatric, Hematologic, Endocrine, Musculoskeletal, and Genitourinary. The pertinent cardiovascular findings are reported above with all other cardiovascular points within those systems being negative.    Diagnostic Study Review:     Current Electrocardiogram:    ECG 12 Lead    Date/Time: 2022 2:08 PM  Performed by: Laila Alvarez MD  Authorized by: Laila Alvarez MD   Comparison: compared with previous ECG   Similar to previous ECG  Rhythm: sinus rhythm  Rate: normal  BPM: 63  Conduction: conduction normal  QRS axis: normal  Other findings: non-specific ST-T wave changes    Clinical impression: abnormal EKG              NOTE: The following portions of the patient's history  were reviewed and updated this visit as appropriate: allergies, current medications, past family history, past medical history, past social history, past surgical history and problem list.

## 2022-02-28 NOTE — TELEPHONE ENCOUNTER
THYROID WAS MILDLY ABNORMAL  RECOMMEND TAKING 2 THYROID PILLS ON SAT AND SUND, CONTINUE 1 PILL Monday TO FRIDAY

## 2022-03-01 ENCOUNTER — TELEPHONE (OUTPATIENT)
Dept: FAMILY MEDICINE CLINIC | Facility: CLINIC | Age: 72
End: 2022-03-01

## 2022-03-01 RX ORDER — METFORMIN HYDROCHLORIDE 1000 MG/1
1000 TABLET, FILM COATED, EXTENDED RELEASE ORAL
Qty: 90 TABLET | Refills: 1 | Status: SHIPPED | OUTPATIENT
Start: 2022-03-01 | End: 2022-03-03 | Stop reason: SDUPTHER

## 2022-03-01 NOTE — TELEPHONE ENCOUNTER
Ask pt/daughter  Tommie was too expenxive and I have myrbetriq .  Bupropion was orderd in February by another provider. Plsverify who ordered that

## 2022-03-01 NOTE — TELEPHONE ENCOUNTER
Caller: Radha Barbour    Relationship: Self    Best call back number: [phone] (M)    What medications are you currently taking:   Current Outpatient Medications on File Prior to Visit   Medication Sig Dispense Refill   • amLODIPine (NORVASC) 5 MG tablet Take 1 tablet by mouth Daily. 90 tablet 3   • aspirin 81 MG EC tablet Take 81 mg by mouth Daily.     • buPROPion (WELLBUTRIN) 100 MG tablet 100 mg 2 (Two) Times a Day.     • CBD oil (cannabidiol) capsule Take 25 each by mouth Daily. gummies     • cloNIDine (CATAPRES) 0.2 MG tablet Take 1 tablet by mouth Daily. 90 tablet 1   • clotrimazole-betamethasone (LOTRISONE) 1-0.05 % cream Apply 1 application topically to the appropriate area as directed 2 (Two) Times a Day. Apply above R eyebrow PRN rash     • gabapentin (NEURONTIN) 300 MG capsule Take 1 capsule by mouth 3 (Three) Times a Day. 90 capsule 0   • hydrOXYzine (ATARAX) 10 MG tablet Take 1 tablet by mouth Daily. 90 tablet 1   • levothyroxine (SYNTHROID, LEVOTHROID) 137 MCG tablet Take 1 tablet by mouth Daily. Take 1 tablet daily Sunday to fri 72 tablet 0   • meclizine (ANTIVERT) 25 MG tablet Take 1 tablet by mouth 3 (Three) Times a Day As Needed for Dizziness. 180 tablet 0   • memantine (Namenda) 10 MG tablet Take 1 tablet by mouth 2 (Two) Times a Day. 180 tablet 1   • metFORMIN (FORTAMET) 1000 MG (OSM) 24 hr tablet Take 1 tablet by mouth Daily With Breakfast. 90 tablet 1   • Mirabegron ER (Myrbetriq) 25 MG tablet sustained-release 24 hour 24 hr tablet Take 1 tablet by mouth Daily. 90 tablet 1   • Misc Natural Products (ENERGY SUPPORT PO) Take 1 tablet/day by mouth.      • Nutritional Supplements (KETO PO) Take 2 tablet/day by mouth.     • omeprazole (priLOSEC) 40 MG capsule Take 1 capsule by mouth Daily. 90 capsule 1   • pravastatin (Pravachol) 20 MG tablet Take 1 tablet by mouth Daily. 90 tablet 1   • ramipril (ALTACE) 2.5 MG capsule Take 1 capsule by mouth Daily. 90 capsule 1   • rOPINIRole  (REQUIP) 0.5 MG tablet Take 1 tablet by mouth every night at bedtime. 90 tablet 1   • venlafaxine XR (EFFEXOR-XR) 150 MG 24 hr capsule Take 1 capsule by mouth 2 (Two) Times a Day. 180 capsule 1   • vitamin B-12 (CYANOCOBALAMIN) 1000 MCG tablet Take 1,000 mcg by mouth Daily.     • Vitamin D, Cholecalciferol, 50 MCG (2000 UT) capsule Take 2,000 Units by mouth Daily.       No current facility-administered medications on file prior to visit.          When did you start taking these medications:     Which medication are you concerned about: metFORMIN (FORTAMET) 1000 MG (OSM) 24 hr tablet    Who prescribed you this medication:     What are your concerns: PATIENT STATES WAS TOLD BY PHARMACY INSURANCE WILL NOT COVER THE BRAND NAME OF THIS MEDICATION    How long have you had these concerns:

## 2022-03-02 NOTE — TELEPHONE ENCOUNTER
HUB to share    Ask pt/daughter  Tommie was too expenxive and I have myrbetriq .  Bupropion was orderd in February by another provider. Plsverify who ordered that    LVM asking pt/ dtr to verify current meds and who last filled the Bupropion?

## 2022-03-02 NOTE — TELEPHONE ENCOUNTER
Please resend the Metformin for 30 day supply, Insurance will not pay for 90 day supply.       [employer] has approved a 30 day supply for the drug listed above but denied your request for a 90 day supply. The drug you requested is a specialty drug. Your Prescription Drug Guide says that specialty drugs are limited to a 30-day supply. You can view your PDG online at [employer]SearchResources. A full list of pharmacies is also available at The Pharmacy Finder Tool at [email or web address]/ or you may call customer [facility] at [phone] (TTY: 71), 8 a.m. 8 p.m. EST, seven days a week for a full list of in-network pharmacies. If receiving your drugs via mail-order delivery is more convenient for you, you can visit our list of in-network mail-order pharmacies at [email or web address] (30-day supply limit on some specialty drugs also applies to mail-order). [employer] has approved coverage for the drug listed above, up to a 30-day supply per fill, under your Part D benefit for/through 12/31/2022.

## 2022-03-03 RX ORDER — METFORMIN HYDROCHLORIDE 1000 MG/1
1000 TABLET, FILM COATED, EXTENDED RELEASE ORAL
Qty: 30 TABLET | Refills: 5 | Status: SHIPPED | OUTPATIENT
Start: 2022-03-03 | End: 2022-03-11 | Stop reason: CLARIF

## 2022-03-04 NOTE — TELEPHONE ENCOUNTER
Fax rec'd from [facility]es[facility]    Metformin ER 500mg (AB3) not covered.  [facility]us says Metfromin ER (AB1) (glucophage ER)  Is the pref alternative

## 2022-03-05 ENCOUNTER — HOSPITAL ENCOUNTER (OUTPATIENT)
Dept: CT IMAGING | Facility: HOSPITAL | Age: 72
Discharge: HOME | End: 2022-03-05
Admitting: FAMILY MEDICINE

## 2022-03-05 DIAGNOSIS — S09.90XD TRAUMATIC INJURY OF HEAD, SUBSEQUENT ENCOUNTER: ICD-10-CM

## 2022-03-05 PROCEDURE — 70450 CT HEAD/BRAIN W/O DYE: CPT

## 2022-03-07 RX ORDER — LEVOTHYROXINE SODIUM 137 UG/1
137 TABLET ORAL DAILY
Qty: 90 TABLET | Refills: 0 | Status: SHIPPED | OUTPATIENT
Start: 2022-03-07 | End: 2022-03-15

## 2022-03-14 ENCOUNTER — TELEPHONE (OUTPATIENT)
Dept: FAMILY MEDICINE CLINIC | Facility: CLINIC | Age: 72
End: 2022-03-14

## 2022-03-14 NOTE — TELEPHONE ENCOUNTER
Rx Refill Note  Requested Prescriptions     Pending Prescriptions Disp Refills   • meclizine (ANTIVERT) 25 MG tablet [Pharmacy Med Name: MECLIZINE HYDROCHLORIDE 25 MG Tablet] 180 tablet 0     Sig: TAKE 1 TABLET THREE TIMES DAILY AS NEEDED FOR DIZZINESS   • levothyroxine (SYNTHROID, LEVOTHROID) 137 MCG tablet [Pharmacy Med Name: LEVOTHYROXINE SODIUM 137 MCG Tablet] 72 tablet      Sig: TAKE 1 TABLET BY MOUTH DAILY SUNDAY THROUGH FRIDAY   • gabapentin (NEURONTIN) 300 MG capsule [Pharmacy Med Name: GABAPENTIN 300 MG Capsule] 90 capsule 0     Sig: TAKE 1 CAPSULE THREE TIMES DAILY      Last office visit with prescribing clinician: 2/21/2022      Next office visit with prescribing clinician: 3/31/2022     TSH+Free T4 (02/21/2022 12:26)         Ese Peters, RT  03/14/22, 15:35 EDT

## 2022-03-14 NOTE — TELEPHONE ENCOUNTER
DIMITRI WITH [facility] IS CALLING IN TO REQUEST A CALL FROM DR DORSEY OR STAFF TO DISCUSS AN ORDER THAT WAS SENT OVER FOR PATIENT ON 03/04/22    DIMITRI CALL BACK  [phone]

## 2022-03-15 RX ORDER — LEVOTHYROXINE SODIUM 137 UG/1
TABLET ORAL
Qty: 72 TABLET | Refills: 1 | Status: ON HOLD | OUTPATIENT
Start: 2022-03-15 | End: 2022-04-15

## 2022-03-15 RX ORDER — GABAPENTIN 300 MG/1
CAPSULE ORAL
Qty: 90 CAPSULE | Refills: 0 | Status: ON HOLD | OUTPATIENT
Start: 2022-03-15 | End: 2022-04-15

## 2022-03-15 RX ORDER — MECLIZINE HYDROCHLORIDE 25 MG/1
TABLET ORAL
Qty: 180 TABLET | Refills: 0 | Status: ON HOLD | OUTPATIENT
Start: 2022-03-15 | End: 2022-04-15

## 2022-03-15 RX ORDER — BUPROPION HYDROCHLORIDE 100 MG/1
100 TABLET ORAL 2 TIMES DAILY
Qty: 180 TABLET | Refills: 0 | Status: SHIPPED | OUTPATIENT
Start: 2022-03-15 | End: 2022-06-14 | Stop reason: SDUPTHER

## 2022-03-15 NOTE — TELEPHONE ENCOUNTER
metformin ER tablet extended release 24 hr( generic Glucophage XR), and metformin tablet    The regular metformin that you sent in last should be ok.

## 2022-03-15 NOTE — TELEPHONE ENCOUNTER
Incoming Refill Request      Medication requested (name and dose): Bupropion Hcl 100mg    Pharmacy where request should be sent: [facility]    Additional details provided by patient: n/a    Best call back number:     Does the patient have less than a 3 day supply:  [] Yes  [x] No    Carrie Meek, Jessed Rep  03/15/22, 08:05 EDT

## 2022-03-15 NOTE — TELEPHONE ENCOUNTER
Rx Refill Note  Requested Prescriptions     Pending Prescriptions Disp Refills   • buPROPion (WELLBUTRIN) 100 MG tablet       Si tablet 2 (Two) Times a Day.      Last office visit with prescribing clinician: 2022      Next office visit with prescribing clinician: 3/31/2022     Comprehensive metabolic panel (2021 11:55)         Luci Garcia, RADHA  03/15/22, 08:54 EDT

## 2022-03-24 ENCOUNTER — HOSPITAL ENCOUNTER (OUTPATIENT)
Dept: CARDIOLOGY | Facility: HOSPITAL | Age: 72
Discharge: HOME | End: 2022-03-24

## 2022-03-24 VITALS
SYSTOLIC BLOOD PRESSURE: 165 MMHG | DIASTOLIC BLOOD PRESSURE: 56 MMHG | WEIGHT: 179 LBS | BODY MASS INDEX: 29.82 KG/M2 | HEART RATE: 69 BPM | HEIGHT: 65 IN

## 2022-03-24 DIAGNOSIS — R06.02 SOB (SHORTNESS OF BREATH): ICD-10-CM

## 2022-03-24 DIAGNOSIS — I20.9 ANGINA PECTORIS: ICD-10-CM

## 2022-03-24 DIAGNOSIS — R00.2 PALPITATIONS: ICD-10-CM

## 2022-03-24 PROCEDURE — 0 TECHNETIUM SESTAMIBI: Performed by: INTERNAL MEDICINE

## 2022-03-24 PROCEDURE — 93017 CV STRESS TEST TRACING ONLY: CPT

## 2022-03-24 PROCEDURE — 93016 CV STRESS TEST SUPVJ ONLY: CPT | Performed by: INTERNAL MEDICINE

## 2022-03-24 PROCEDURE — 78452 HT MUSCLE IMAGE SPECT MULT: CPT | Performed by: INTERNAL MEDICINE

## 2022-03-24 PROCEDURE — 93018 CV STRESS TEST I&R ONLY: CPT | Performed by: INTERNAL MEDICINE

## 2022-03-24 PROCEDURE — 25010000002 REGADENOSON 0.4 MG/5ML SOLUTION: Performed by: INTERNAL MEDICINE

## 2022-03-24 PROCEDURE — A9500 TC99M SESTAMIBI: HCPCS | Performed by: INTERNAL MEDICINE

## 2022-03-24 PROCEDURE — 78452 HT MUSCLE IMAGE SPECT MULT: CPT

## 2022-03-24 PROCEDURE — 93306 TTE W/DOPPLER COMPLETE: CPT

## 2022-03-24 RX ADMIN — TECHNETIUM TC 99M SESTAMIBI 1 DOSE: 1 INJECTION INTRAVENOUS at 12:10

## 2022-03-24 RX ADMIN — TECHNETIUM TC 99M SESTAMIBI 1 DOSE: 1 INJECTION INTRAVENOUS at 10:42

## 2022-03-24 RX ADMIN — REGADENOSON 0.4 MG: 0.08 INJECTION, SOLUTION INTRAVENOUS at 12:11

## 2022-03-25 DIAGNOSIS — R94.30 ABNORMAL RESULTS OF CARDIOVASCULAR FUNCTION STUDIES: ICD-10-CM

## 2022-03-25 DIAGNOSIS — R06.02 SOB (SHORTNESS OF BREATH): ICD-10-CM

## 2022-03-25 DIAGNOSIS — I20.9 ANGINA PECTORIS: Primary | ICD-10-CM

## 2022-03-25 LAB
ASCENDING AORTA: 3.3 CM
BH CV ECHO MEAS - ACS: 1.05 CM
BH CV ECHO MEAS - AI P1/2T: 417 MSEC
BH CV ECHO MEAS - AO MAX PG: 26.5 MMHG
BH CV ECHO MEAS - AO MEAN PG: 14.2 MMHG
BH CV ECHO MEAS - AO ROOT DIAM: 3.7 CM
BH CV ECHO MEAS - AO V2 MAX: 257.1 CM/SEC
BH CV ECHO MEAS - AO V2 VTI: 64 CM
BH CV ECHO MEAS - AVA(I,D): 1.44 CM2
BH CV ECHO MEAS - EDV(CUBED): 127.1 ML
BH CV ECHO MEAS - EDV(MOD-SP2): 109.5 ML
BH CV ECHO MEAS - EDV(MOD-SP4): 117.9 ML
BH CV ECHO MEAS - EF(MOD-BP): 55 %
BH CV ECHO MEAS - EF(MOD-SP2): 55.3 %
BH CV ECHO MEAS - EF(MOD-SP4): 51 %
BH CV ECHO MEAS - ESV(CUBED): 55.6 ML
BH CV ECHO MEAS - ESV(MOD-SP2): 48.9 ML
BH CV ECHO MEAS - ESV(MOD-SP4): 57.8 ML
BH CV ECHO MEAS - FS: 24.1 %
BH CV ECHO MEAS - IVS/LVPW: 1.54 CM
BH CV ECHO MEAS - IVSD: 1.44 CM
BH CV ECHO MEAS - LA DIMENSION(2D): 4.4 CM
BH CV ECHO MEAS - LA DIMENSION: 4.2 CM
BH CV ECHO MEAS - LAT PEAK E' VEL: 7 CM/SEC
BH CV ECHO MEAS - LV DIASTOLIC VOL/BSA (35-75): 62.5 CM2
BH CV ECHO MEAS - LV MASS(C)D: 232.2 GRAMS
BH CV ECHO MEAS - LV MAX PG: 3.7 MMHG
BH CV ECHO MEAS - LV MEAN PG: 1.95 MMHG
BH CV ECHO MEAS - LV SYSTOLIC VOL/BSA (12-30): 30.6 CM2
BH CV ECHO MEAS - LV V1 MAX: 96.7 CM/SEC
BH CV ECHO MEAS - LV V1 VTI: 24.2 CM
BH CV ECHO MEAS - LVIDD: 5 CM
BH CV ECHO MEAS - LVIDS: 3.8 CM
BH CV ECHO MEAS - LVOT AREA: 3.8 CM2
BH CV ECHO MEAS - LVOT DIAM: 2.2 CM
BH CV ECHO MEAS - LVPWD: 0.94 CM
BH CV ECHO MEAS - MED PEAK E' VEL: 6 CM/SEC
BH CV ECHO MEAS - MV A MAX VEL: 74 CM/SEC
BH CV ECHO MEAS - MV DEC SLOPE: 760.2 CM/SEC2
BH CV ECHO MEAS - MV DEC TIME: 0.13 MSEC
BH CV ECHO MEAS - MV E MAX VEL: 97.4 CM/SEC
BH CV ECHO MEAS - MV E/A: 1.32
BH CV ECHO MEAS - MV MAX PG: 5 MMHG
BH CV ECHO MEAS - MV MEAN PG: 1.31 MMHG
BH CV ECHO MEAS - MV P1/2T: 55 MSEC
BH CV ECHO MEAS - MV V2 VTI: 27.3 CM
BH CV ECHO MEAS - MVA(P1/2T): 4 CM2
BH CV ECHO MEAS - MVA(VTI): 3.4 CM2
BH CV ECHO MEAS - PA ACC SLOPE: 497 CM/SEC2
BH CV ECHO MEAS - PA ACC TIME: 0.12 SEC
BH CV ECHO MEAS - PA PR(ACCEL): 25.9 MMHG
BH CV ECHO MEAS - PA V2 MAX: 91.3 CM/SEC
BH CV ECHO MEAS - PULM A REVS DUR: 0.1 SEC
BH CV ECHO MEAS - PULM A REVS VEL: 27.6 CM/SEC
BH CV ECHO MEAS - PULM DIAS VEL: 63.2 CM/SEC
BH CV ECHO MEAS - PULM SYS VEL: 54.4 CM/SEC
BH CV ECHO MEAS - RAP SYSTOLE: 8 MMHG
BH CV ECHO MEAS - RV MAX PG: 1.59 MMHG
BH CV ECHO MEAS - RV V1 MAX: 63 CM/SEC
BH CV ECHO MEAS - RV V1 VTI: 14.8 CM
BH CV ECHO MEAS - RVSP: 41.1 MMHG
BH CV ECHO MEAS - SI(MOD-SP2): 32.1 ML/M2
BH CV ECHO MEAS - SI(MOD-SP4): 31.8 ML/M2
BH CV ECHO MEAS - SV(LVOT): 92.2 ML
BH CV ECHO MEAS - SV(MOD-SP2): 60.5 ML
BH CV ECHO MEAS - SV(MOD-SP4): 60.1 ML
BH CV ECHO MEAS - TAPSE (>1.6): 2.3 CM
BH CV ECHO MEAS - TR MAX PG: 33.1 MMHG
BH CV ECHO MEAS - TR MAX VEL: 287.4 CM/SEC
BH CV ECHO MEASUREMENTS AVERAGE E/E' RATIO: 14.98
BH CV REST NUCLEAR ISOTOPE DOSE: 8 MCI
BH CV STRESS COMMENTS STAGE 1: NORMAL
BH CV STRESS DOSE REGADENOSON STAGE 1: 0.4
BH CV STRESS DURATION MIN STAGE 1: 0
BH CV STRESS DURATION SEC STAGE 1: 10
BH CV STRESS NUCLEAR ISOTOPE DOSE: 32.2 MCI
BH CV STRESS PROTOCOL 1: NORMAL
BH CV STRESS RECOVERY BP: NORMAL MMHG
BH CV STRESS RECOVERY HR: 71 BPM
BH CV STRESS STAGE 1: 1
BH CV VAS BP LEFT ARM: NORMAL MMHG
BH CV XLRA - RV BASE: 3.6 CM
BH CV XLRA - RV MID: 3.1 CM
BH CV XLRA - TDI S': 13 CM/SEC
IVRT: 74 MSEC
LEFT ATRIUM VOLUME INDEX: 36 ML/M2
LEFT ATRIUM VOLUME: 69 CM3
LV EF 2D ECHO EST: 55 %
LV EF NUC BP: 66 %
MAXIMAL PREDICTED HEART RATE: 148 BPM
MAXIMAL PREDICTED HEART RATE: 148 BPM
PERCENT MAX PREDICTED HR: 49.32 %
STRESS BASELINE BP: NORMAL MMHG
STRESS BASELINE HR: 64 BPM
STRESS PERCENT HR: 58 %
STRESS POST PEAK BP: NORMAL MMHG
STRESS POST PEAK HR: 73 BPM
STRESS TARGET HR: 126 BPM
STRESS TARGET HR: 126 BPM

## 2022-03-25 PROCEDURE — 93306 TTE W/DOPPLER COMPLETE: CPT | Performed by: INTERNAL MEDICINE

## 2022-03-31 ENCOUNTER — OFFICE VISIT (OUTPATIENT)
Dept: FAMILY MEDICINE CLINIC | Facility: CLINIC | Age: 72
End: 2022-03-31

## 2022-03-31 VITALS
HEART RATE: 71 BPM | BODY MASS INDEX: 29.82 KG/M2 | SYSTOLIC BLOOD PRESSURE: 152 MMHG | HEIGHT: 65 IN | OXYGEN SATURATION: 97 % | DIASTOLIC BLOOD PRESSURE: 64 MMHG | TEMPERATURE: 98 F | WEIGHT: 179 LBS | RESPIRATION RATE: 20 BRPM

## 2022-03-31 DIAGNOSIS — Z78.0 POSTMENOPAUSE: ICD-10-CM

## 2022-03-31 DIAGNOSIS — E03.9 ACQUIRED HYPOTHYROIDISM: ICD-10-CM

## 2022-03-31 DIAGNOSIS — E55.9 VITAMIN D DEFICIENCY: ICD-10-CM

## 2022-03-31 DIAGNOSIS — R94.30 ABNORMAL RESULTS OF CARDIOVASCULAR FUNCTION STUDIES: ICD-10-CM

## 2022-03-31 DIAGNOSIS — R41.89 COGNITIVE IMPAIRMENT: ICD-10-CM

## 2022-03-31 DIAGNOSIS — E11.69 TYPE 2 DIABETES MELLITUS WITH OTHER SPECIFIED COMPLICATION, WITHOUT LONG-TERM CURRENT USE OF INSULIN: ICD-10-CM

## 2022-03-31 DIAGNOSIS — R06.02 SOB (SHORTNESS OF BREATH): ICD-10-CM

## 2022-03-31 DIAGNOSIS — I20.9 ANGINA PECTORIS: ICD-10-CM

## 2022-03-31 DIAGNOSIS — R42 DIZZINESS: Primary | ICD-10-CM

## 2022-03-31 LAB
INR PPP: 0.94 (ref 0.93–1.1)
PROTHROMBIN TIME: 10.5 SECONDS (ref 9.6–11.7)

## 2022-03-31 PROCEDURE — 82306 VITAMIN D 25 HYDROXY: CPT | Performed by: FAMILY MEDICINE

## 2022-03-31 PROCEDURE — 85610 PROTHROMBIN TIME: CPT | Performed by: INTERNAL MEDICINE

## 2022-03-31 PROCEDURE — 36415 COLL VENOUS BLD VENIPUNCTURE: CPT | Performed by: FAMILY MEDICINE

## 2022-03-31 PROCEDURE — 84443 ASSAY THYROID STIM HORMONE: CPT | Performed by: FAMILY MEDICINE

## 2022-03-31 PROCEDURE — 80048 BASIC METABOLIC PNL TOTAL CA: CPT | Performed by: FAMILY MEDICINE

## 2022-03-31 PROCEDURE — 99214 OFFICE O/P EST MOD 30 MIN: CPT | Performed by: FAMILY MEDICINE

## 2022-03-31 PROCEDURE — 84439 ASSAY OF FREE THYROXINE: CPT | Performed by: FAMILY MEDICINE

## 2022-03-31 PROCEDURE — 85025 COMPLETE CBC W/AUTO DIFF WBC: CPT | Performed by: INTERNAL MEDICINE

## 2022-03-31 RX ORDER — RAMIPRIL 2.5 MG/1
5 CAPSULE ORAL DAILY
Qty: 90 CAPSULE | Refills: 1 | Status: SHIPPED | OUTPATIENT
Start: 2022-03-31 | End: 2022-08-31 | Stop reason: SDUPTHER

## 2022-03-31 RX ORDER — ROPINIROLE 0.5 MG/1
0.5 TABLET, FILM COATED ORAL
Qty: 90 TABLET | Refills: 1 | Status: SHIPPED | OUTPATIENT
Start: 2022-03-31 | End: 2022-04-26 | Stop reason: SDUPTHER

## 2022-03-31 RX ORDER — DIPHENHYDRAMINE HCL 25 MG
1 TABLET ORAL DAILY
Qty: 1 KIT | Refills: 0 | Status: ON HOLD | OUTPATIENT
Start: 2022-03-31 | End: 2022-04-15

## 2022-04-01 LAB
25(OH)D3 SERPL-MCNC: 29.6 NG/ML (ref 30–100)
ANION GAP SERPL CALCULATED.3IONS-SCNC: 9.7 MMOL/L (ref 5–15)
BASOPHILS # BLD AUTO: 0.11 10*3/MM3 (ref 0–0.2)
BASOPHILS NFR BLD AUTO: 1.6 % (ref 0–1.5)
BUN SERPL-MCNC: 15 MG/DL (ref 8–23)
BUN/CREAT SERPL: 16.7 (ref 7–25)
CALCIUM SPEC-SCNC: 9.4 MG/DL (ref 8.6–10.5)
CHLORIDE SERPL-SCNC: 103 MMOL/L (ref 98–107)
CO2 SERPL-SCNC: 29.3 MMOL/L (ref 22–29)
CREAT SERPL-MCNC: 0.9 MG/DL (ref 0.57–1)
DEPRECATED RDW RBC AUTO: 40.1 FL (ref 37–54)
EGFRCR SERPLBLD CKD-EPI 2021: 68.1 ML/MIN/1.73
EOSINOPHIL # BLD AUTO: 0.62 10*3/MM3 (ref 0–0.4)
EOSINOPHIL NFR BLD AUTO: 9.2 % (ref 0.3–6.2)
ERYTHROCYTE [DISTWIDTH] IN BLOOD BY AUTOMATED COUNT: 12.3 % (ref 12.3–15.4)
GLUCOSE SERPL-MCNC: 93 MG/DL (ref 65–99)
HCT VFR BLD AUTO: 34.1 % (ref 34–46.6)
HGB BLD-MCNC: 11.2 G/DL (ref 12–15.9)
IMM GRANULOCYTES # BLD AUTO: 0.05 10*3/MM3 (ref 0–0.05)
IMM GRANULOCYTES NFR BLD AUTO: 0.7 % (ref 0–0.5)
LYMPHOCYTES # BLD AUTO: 1.02 10*3/MM3 (ref 0.7–3.1)
LYMPHOCYTES NFR BLD AUTO: 15.1 % (ref 19.6–45.3)
MCH RBC QN AUTO: 29.5 PG (ref 26.6–33)
MCHC RBC AUTO-ENTMCNC: 32.8 G/DL (ref 31.5–35.7)
MCV RBC AUTO: 89.7 FL (ref 79–97)
MONOCYTES # BLD AUTO: 0.6 10*3/MM3 (ref 0.1–0.9)
MONOCYTES NFR BLD AUTO: 8.9 % (ref 5–12)
NEUTROPHILS NFR BLD AUTO: 4.37 10*3/MM3 (ref 1.7–7)
NEUTROPHILS NFR BLD AUTO: 64.5 % (ref 42.7–76)
NRBC BLD AUTO-RTO: 0 /100 WBC (ref 0–0.2)
PLATELET # BLD AUTO: 318 10*3/MM3 (ref 140–450)
PMV BLD AUTO: 10.2 FL (ref 6–12)
POTASSIUM SERPL-SCNC: 4.4 MMOL/L (ref 3.5–5.2)
RBC # BLD AUTO: 3.8 10*6/MM3 (ref 3.77–5.28)
SODIUM SERPL-SCNC: 142 MMOL/L (ref 136–145)
T4 FREE SERPL-MCNC: 1.62 NG/DL (ref 0.93–1.7)
TSH SERPL DL<=0.05 MIU/L-ACNC: 0.38 UIU/ML (ref 0.27–4.2)
WBC NRBC COR # BLD: 6.77 10*3/MM3 (ref 3.4–10.8)

## 2022-04-01 NOTE — PROGRESS NOTES
Subjective   Radha Barbour is a 71 y.o. female.     Chief Complaint   Patient presents with   • Follow-up       History of Present Illness   The patient is accompanied by her son, Nato, and her daughter, Rachana.    The patient was seen by Dr. Cisneros, who performed a scope. The patient does have a temporary catheter and then next week, she will be placed on a nerve stimulator due to significant urinary urgency and frequency. She is unable to feel when her bladder is full.     The patient was seen by Dr. Laila Alvarez, her cardiologist. She had an echocardiogram and a stress test performed last week. The patient denies any chest pain. The patient is scheduled for follow-up on 04/08/2022. She reports that she is going to receive a call to schedule a stent placement. The patient denies chest pain.    The patient had a CT scan of the head and was informed everything looked good. However, later that day, the patient had an event where she could not speak. The patient states she was just mumbling. The patient denies any syncope. She states that the episode lasted for a few minutes. Her sister thought she was having a stroke, so she put her to bed rather than call anyone. Her daughter states that when she woke up, she was fine. The patient states that this was not the first time it has happened. When she fell, and injured her arm, she could not speak that day. Her son states that the patient has had a couple of falls in the last 3 months due to weakness. The patient denies any injury to her neck. She denies any dizziness when turning her head. Her son states that her weakness, and memory is worse in the evenings. He also states that she has been having trouble with her memory. The patient lives with her sister and brother-in-law. The patient states that she still drives, but has not been driving much lately due to intermittent dizziness. She denies any trouble with getting lost. She states that she has had  cognitive issues for years. The patient was started on Namenda twice per day for her memory.    The patient states that she has been taking gabapentin since her last visit for neuropathy.    The patient denies any leg cramps other than at night. She reports that she takes ropinirole for her restless legs syndrome, which gives her relief.    The patient states that the swelling in her legs has not improved. Her son states that the cardiologist reduced one of her blood pressure medications to help with her swelling. The patient is currently taking clonidine once per day.    The patient states that she has not had a bone density scan. She states that she had a breast biopsy, which was normal. The patient states that she has not had a hysterectomy. She states that her last Pap test was 2 years ago, which was normal. The patient denies any abdominal pain or cramping.     The patient states that she has been taking Fortamet. She does not check her blood glucose levels regularly. She does have a blood glucose monitor at home, but does not recall the name of it.      The following portions of the patient's history were reviewed and updated as appropriate: allergies, current medications, past family history, past medical history, past social history, past surgical history and problem list.    Past Medical History:   Diagnosis Date   • B12 deficiency 7/8/2016    Last Assessment & Plan:  Formatting of this note might be different from the original.  Compliant and controlled with current medication B 12 supplements . Lab today B 12   • Back pain, chronic 7/8/2016    Last Assessment & Plan:  Formatting of this note might be different from the original. Will refer to PT   • Depression 7/8/2016    Last Assessment & Plan:  Formatting of this note might be different from the original.  Compliant and controlled with current medication   • Diabetic peripheral neuropathy (HCC) 2/14/2020    Last Assessment & Plan:  Formatting of this  note might be different from the original. Stable   • DM type 2, goal HbA1c < 7% ([facility]) 3/27/2017    Last Assessment & Plan:  Formatting of this note might be different from the original.  Compliant and controlled with current diet.llabs today A1c   • Dyslipidemia 10/11/2017    Last Assessment & Plan:  Formatting of this note might be different from the original.  Compliant and controlled with current medication/statin therapy/labs today   • Environmental and seasonal allergies 10/11/2019   • Gastric reflux 3/27/2017   • HTN, goal below 130/80 2/10/2016    Last Assessment & Plan:  Formatting of this note might be different from the original. Compliant and controlled with current medication ramipril /labs today cmp to check renal function and electrolytes   • Hyperlipidemia    • Hypothyroidism 2014    Last Assessment & Plan:  Formatting of this note might be different from the original. TSH low reduce levothyroxine.  Will check TSH today.   • Insomnia 2015   • Lumbosacral radiculopathy 2020    Last Assessment & Plan:  Formatting of this note might be different from the original.  Compliant and controlled with current medication Muscle relaxer   • Osteoporosis 2020    Formatting of this note is different from the original. RADIOLOGY REPORT   FACILITY:  [facility] UNIT/AGE/GENDER: [id number]  OP      AGE:69 Y          SEX:F PATIENT NAME/:  GM DERAS    1950 UNIT NUMBER:  [id number] ACCOUNT NUMBER:  [id number] ACCESSION NUMBER:  [id number]     EXAMINATION: Bone densitometry of multiple sites, lumbar spine, left hip and distal   • Restless leg syndrome 2018    Last Assessment & Plan:  Formatting of this note might be different from the original.  Compliant and controlled with current medication requip       Past Surgical History:   Procedure Laterality Date   • COLON SURGERY  2006       Family History   Problem Relation Age of Onset   • Arthritis Mother    •  Colon cancer Mother    • Thyroid disease Mother    • Stroke Mother        Review of Systems   Constitutional: Negative for fatigue and fever.   HENT: Negative for ear pain and sore throat.    Eyes: Negative for blurred vision.   Respiratory: Negative for cough and shortness of breath.    Cardiovascular: Positive for leg swelling. Negative for chest pain and palpitations.   Gastrointestinal: Negative for abdominal pain, constipation, diarrhea, nausea and vomiting.   Musculoskeletal: Negative for arthralgias and myalgias.   Skin: Negative for rash.   Neurological: Positive for dizziness. Negative for weakness and headache.   Psychiatric/Behavioral: Negative for sleep disturbance and depressed mood.       Objective   Physical Exam  Vitals and nursing note reviewed.   Constitutional:       Appearance: Normal appearance. She is well-developed.   Pulmonary:      Effort: Pulmonary effort is normal.      Breath sounds: Normal air entry.   Neurological:      Mental Status: She is alert and oriented to person, place, and time.      Motor: Motor function is intact.   Psychiatric:         Mood and Affect: Mood normal.         Behavior: Behavior normal.         Vitals:    03/31/22 1100   BP: 152/64   Pulse: 71   Resp: 20   Temp: 98 °F (36.7 °C)   SpO2: 97%     Body mass index is 29.79 kg/m².    Hemoglobin A1C   Date Value Ref Range Status   12/09/2021 6.6 (H) 3.5 - 5.6 % Final   04/29/2021 6.3 (H) 4.3 - 5.6 % Final     Comment:     (note)  A1C% Reference Range:  4.3 - 5.6  Normal range  5.7 - 6.4  Pre-diabetic -increased risk for developing diabetes mellitus.  >=6.5      Diabetic -diagnostic of diabetes mellitus.     Note: For diagnosis of diabetes in individuals without unequivocal   hyperglycemia, results should be confirmed by repeat testing.  Patients with conditions that shorten erythrocyte survival, such as  recovery from acute blood loss, hemolytic anemia, kidney disease,  or the presence of unstable hemoglobins like  HbSS, HbCC, and HbSC  may yield falsely decreased HbA1c test results. Iron deficiency may  yield falsely increased HbA1c test results.   02/02/2021 7.0 (H) 4.3 - 5.6 % Final     Comment:     (note)  A1C% Reference Range:  4.3 - 5.6  Normal range  5.7 - 6.4  Pre-diabetic -increased risk for developing diabetes mellitus.  >=6.5      Diabetic -diagnostic of diabetes mellitus.     Note: For diagnosis of diabetes in individuals without unequivocal   hyperglycemia, results should be confirmed by repeat testing.  Patients with conditions that shorten erythrocyte survival, such as  recovery from acute blood loss, hemolytic anemia, kidney disease,  or the presence of unstable hemoglobins like HbSS, HbCC, and HbSC  may yield falsely decreased HbA1c test results. Iron deficiency may  yield falsely increased HbA1c test results.   10/20/2020 7.1 (H) 4.3 - 5.6 % Final     Comment:     (note)  A1C% Reference Range:  4.3 - 5.6  Normal range  5.7 - 6.4  Pre-diabetic -increased risk for developing diabetes mellitus.  >=6.5      Diabetic -diagnostic of diabetes mellitus.     Note: For diagnosis of diabetes in individuals without unequivocal   hyperglycemia, results should be confirmed by repeat testing.  Patients with conditions that shorten erythrocyte survival, such as  recovery from acute blood loss, hemolytic anemia, kidney disease,  or the presence of unstable hemoglobins like HbSS, HbCC, and HbSC  may yield falsely decreased HbA1c test results. Iron deficiency may  yield falsely increased HbA1c test results.     LDL Cholesterol    Date Value Ref Range Status   06/09/2020 225 (H) 0 - 100 mg/dL Final   02/13/2020 60 0 - 100 mg/dL Final   06/04/2019 71 0 - 100 mg/dL Final     TSH   Date Value Ref Range Status   03/31/2022 0.375 0.270 - 4.200 uIU/mL Final   02/21/2022 4.920 (H) 0.270 - 4.200 uIU/mL Final   12/09/2021 0.182 (L) 0.270 - 4.200 uIU/mL Final   04/29/2021 1.850 0.27 - 4.20 u(iU)/mL Final   02/02/2021 0.983 0.27 - 4.20  u(iU)/mL Final   10/20/2020 0.096 (L) 0.27 - 4.20 u(iU)/mL Final     Results for orders placed or performed in visit on 04/13/22   COVID-19,APTIMA PANTHER(KRYSTINA),[facility]/ [facility], NP/OP SWAB IN UTM/VTM/[facility]ALINE TRANSPORT MEDIA,24 HR TAT - Swab, Nasopharynx    Specimen: Nasopharynx; Swab   Result Value Ref Range    COVID19 Not Detected Not Detected - Ref. Range   Protime-INR    Specimen: Blood   Result Value Ref Range    Protime 9.9 9.6 - 11.7 Seconds    INR 0.96 0.93 - 1.10   CBC (No Diff)    Specimen: Blood   Result Value Ref Range    WBC 7.20 3.40 - 10.80 10*3/mm3    RBC 3.91 3.77 - 5.28 10*6/mm3    Hemoglobin 11.6 (L) 12.0 - 15.9 g/dL    Hematocrit 34.6 34.0 - 46.6 %    MCV 88.5 79.0 - 97.0 fL    MCH 29.7 26.6 - 33.0 pg    MCHC 33.5 31.5 - 35.7 g/dL    RDW 13.3 12.3 - 15.4 %    RDW-SD 41.6 37.0 - 54.0 fl    MPV 7.4 6.0 - 12.0 fL    Platelets 317 140 - 450 10*3/mm3   Basic Metabolic Panel    Specimen: Blood   Result Value Ref Range    Glucose 84 65 - 99 mg/dL    BUN 16 8 - 23 mg/dL    Creatinine 0.94 0.57 - 1.00 mg/dL    Sodium 141 136 - 145 mmol/L    Potassium 4.2 3.5 - 5.2 mmol/L    Chloride 103 98 - 107 mmol/L    CO2 25.0 22.0 - 29.0 mmol/L    Calcium 9.1 8.6 - 10.5 mg/dL    BUN/Creatinine Ratio 17.0 7.0 - 25.0    Anion Gap 13.0 5.0 - 15.0 mmol/L    eGFR 64.6 >60.0 mL/min/1.73   Results for orders placed or performed in visit on 03/31/22   TSH+Free T4    Specimen: Arm, Left; Blood   Result Value Ref Range    TSH 0.375 0.270 - 4.200 uIU/mL    Free T4 1.62 0.93 - 1.70 ng/dL   CBC Auto Differential    Specimen: Arm, Left; Blood   Result Value Ref Range    WBC 6.77 3.40 - 10.80 10*3/mm3    RBC 3.80 3.77 - 5.28 10*6/mm3    Hemoglobin 11.2 (L) 12.0 - 15.9 g/dL    Hematocrit 34.1 34.0 - 46.6 %    MCV 89.7 79.0 - 97.0 fL    MCH 29.5 26.6 - 33.0 pg    MCHC 32.8 31.5 - 35.7 g/dL    RDW 12.3 12.3 - 15.4 %    RDW-SD 40.1 37.0 - 54.0 fl    MPV 10.2 6.0 - 12.0 fL    Platelets 318 140 - 450 10*3/mm3    Neutrophil % 64.5 42.7 - 76.0  %    Lymphocyte % 15.1 (L) 19.6 - 45.3 %    Monocyte % 8.9 5.0 - 12.0 %    Eosinophil % 9.2 (H) 0.3 - 6.2 %    Basophil % 1.6 (H) 0.0 - 1.5 %    Immature Grans % 0.7 (H) 0.0 - 0.5 %    Neutrophils, Absolute 4.37 1.70 - 7.00 10*3/mm3    Lymphocytes, Absolute 1.02 0.70 - 3.10 10*3/mm3    Monocytes, Absolute 0.60 0.10 - 0.90 10*3/mm3    Eosinophils, Absolute 0.62 (H) 0.00 - 0.40 10*3/mm3    Basophils, Absolute 0.11 0.00 - 0.20 10*3/mm3    Immature Grans, Absolute 0.05 0.00 - 0.05 10*3/mm3    nRBC 0.0 0.0 - 0.2 /100 WBC   Vitamin D 25 hydroxy    Specimen: Arm, Left; Blood   Result Value Ref Range    25 Hydroxy, Vitamin D 29.6 (L) 30.0 - 100.0 ng/ml   Protime-INR    Specimen: Arm, Left; Blood   Result Value Ref Range    Protime 10.5 9.6 - 11.7 Seconds    INR 0.94 0.93 - 1.10   Basic Metabolic Panel    Specimen: Arm, Left; Blood   Result Value Ref Range    Glucose 93 65 - 99 mg/dL    BUN 15 8 - 23 mg/dL    Creatinine 0.90 0.57 - 1.00 mg/dL    Sodium 142 136 - 145 mmol/L    Potassium 4.4 3.5 - 5.2 mmol/L    Chloride 103 98 - 107 mmol/L    CO2 29.3 (H) 22.0 - 29.0 mmol/L    Calcium 9.4 8.6 - 10.5 mg/dL    BUN/Creatinine Ratio 16.7 7.0 - 25.0    Anion Gap 9.7 5.0 - 15.0 mmol/L    eGFR 68.1 >60.0 mL/min/1.73   Results for orders placed or performed during the hospital encounter of 03/24/22   Stress Test With Myocardial Perfusion One Day   Result Value Ref Range    Target HR (85%) 126 bpm    Max. Pred. HR (100%) 148 bpm    BH CV STRESS PROTOCOL 1 Pharmacologic     Stage 1 1     Duration Min Stage 1 0     Duration Sec Stage 1 10     Stress Dose Regadenoson Stage 1 0.4     Stress Comments Stage 1 10 sec bolus injection     Baseline HR 64 bpm    Baseline /74 mmHg    Peak HR 73 bpm    Percent Max Pred HR 49.32 %    Percent Target HR 58 %    Peak /64 mmHg    Recovery HR 71 bpm    Recovery /64 mmHg    BH CV REST NUCLEAR ISOTOPE DOSE 8.0 mCi    BH CV STRESS NUCLEAR ISOTOPE DOSE 32.2 mCi    Nuc Stress EF 66 %    Results for orders placed or performed during the hospital encounter of 03/24/22   Adult Transthoracic Echo Complete W/ Cont if Necessary Per Protocol   Result Value Ref Range    Target HR (85%) 126 bpm    Max. Pred. HR (100%) 148 bpm    BH CV VAS BP LEFT /56 mmHg    RV S' 13.00 cm/sec    RV Base 3.60 cm    RV Mid 3.10 cm    LA volume 69.0 cm3    Ascending aorta 3.3 cm    IVRT 74.0 msec    LA Volume Index 36.0 mL/m2    Avg E/e' ratio 14.98     ACS 1.05 cm    Ao root diam 3.7 cm    Ao pk alexandr 257.1 cm/sec    Ao V2 VTI 64.0 cm    FANG(I,D) 1.44 cm2    EDV(cubed) 127.1 ml    EDV(MOD-sp2) 109.5 ml    EDV(MOD-sp4) 117.9 ml    EF(MOD-bp) 55.0 %    EF(MOD-sp2) 55.3 %    EF(MOD-sp4) 51.0 %    ESV(cubed) 55.6 ml    ESV(MOD-sp2) 48.9 ml    ESV(MOD-sp4) 57.8 ml    IVS/LVPW 1.54 cm    Lat Peak E' Alexandr 7.0 cm/sec    LV mass(C)d 232.2 grams    LV V1 max PG 3.7 mmHg    LV V1 mean PG 1.95 mmHg    LV V1 max 96.7 cm/sec    LVPWd 0.94 cm    Med Peak E' Alexandr 6.00 cm/sec    MV dec slope 760.2 cm/sec2    MV dec time 0.13 msec    MV P1/2t 55 msec    MV V2 VTI 27.3 cm    MVA(VTI) 3.4 cm2    PA acc slope 497 cm/sec2    PA acc time 0.12 sec    PA pr(Accel) 25.9 mmHg    PA V2 max 91.3 cm/sec    Pulm A Revs Alexandr 27.6 cm/sec    RAP systole 8.0 mmHg    RV V1 max PG 1.59 mmHg    RV V1 max 63.0 cm/sec    RV V1 VTI 14.8 cm    RVSP(TR) 41.1 mmHg    SI(MOD-sp2) 32.1 ml/m2    SI(MOD-sp4) 31.8 ml/m2    SV(LVOT) 92.2 ml    SV(MOD-sp2) 60.5 ml    SV(MOD-sp4) 60.1 ml    TR max PG 33.1 mmHg    AI P1/2t 417 msec    Ao max PG 26.5 mmHg    Ao mean PG 14.2 mmHg    FS 24.1 %    IVSd 1.44 cm    LA dimension 4.2 cm    LA dimension(2D) 4.4 cm    LV V1 VTI 24.2 cm    LVIDd 5.0 cm    LVIDs 3.8 cm    LVOT area 3.8 cm2    LVOT diam 2.20 cm    MV E/A 1.32     MV max PG 5.0 mmHg    MV mean PG 1.31 mmHg    MVA(P1/2t) 4.0 cm2    MV A max alexandr 74.0 cm/sec    MV E max alexandr 97.4 cm/sec    Pulm A Revs Dur 0.10 sec    Pulm Pollock Alexandr 63.2 cm/sec    Pulm Sys Alexandr 54.4 cm/sec     TR max daryn 287.4 cm/sec    LV Pollock Vol (BSA corrected) 62.5 cm2    LV Sys Vol (BSA corrected) 30.6 cm2    TAPSE (>1.6) 2.30 cm    Echo EF Estimated 55 %     *Note: Due to a large number of results and/or encounters for the requested time period, some results have not been displayed. A complete set of results can be found in Results Review.       Assessment/Plan   Diagnoses and all orders for this visit:    1. Dizziness (Primary)  -     US Carotid Bilateral; Future  -     Cancel: MRI Brain With & Without Contrast; Future  -     MRI Brain Without Contrast; Future    2. Acquired hypothyroidism  -     TSH+Free T4    3. Cognitive impairment  -     Cancel: MRI Brain With & Without Contrast; Future  -     MRI Brain Without Contrast; Future  - Carotid ultrasound ordered.  - MRI of the brain ordered, open, due to claustrophobia.  - Patient to continue taking Namenda as prescribed.  4. Vitamin D deficiency  -     Vitamin D 25 hydroxy; Future  -     Vitamin D 25 hydroxy  - Patient was prescribed 2000 units per day, but has not been taking vitamin D as prescribed.     5. Postmenopause  -     DEXA Bone Density Axial    6. Angina pectoris (HCC)  -     CBC & Differential  -     Basic Metabolic Panel  -     Protime-INR    7. SOB (shortness of breath)  -     CBC & Differential  -     Basic Metabolic Panel  -     Protime-INR    8. Abnormal results of cardiovascular function studies  -     CBC & Differential  -     Basic Metabolic Panel  -     Protime-INR    Other orders  -     ramipril (ALTACE) 2.5 MG capsule; Take 2 capsules by mouth Daily.  Dispense: 90 capsule; Refill: 1  -     rOPINIRole (REQUIP) 0.5 MG tablet; Take 1 tablet by mouth every night at bedtime.  Dispense: 90 tablet; Refill: 1  -     Blood Glucose Monitoring Suppl (True Metrix Air Glucose Meter) w/Device kit; 1 each Daily. Monitor blood glucose once daily  Dispense: 1 kit; Refill: 0    9. Hypertension  -Blood pressure mildly elevated today in the clinic. Family  provided a blood pressure log with systolic readings of 174, 130, 145, and 123, which reflect after the decrease in amlodipine.  -The patient had an echocardiogram performed on 03/25/2022x, which revealed ejection fraction of 55 percent, mild tricuspid regurgitation, and mild mitral regurgitation. The aortic valve has calcification with stenosis.  -The stress test revealed significant ischemia, and she will be scheduled to have a stent placed.   - Keep follow-up appointment with Dr. Laila Alvarez, cardiologist, on 04/08/2022.  -Discontinue clonidine.  -She will increase ramipril from 2.5 mg to 5 mg. A prescription was sent to her preferred pharmacy today.   -The cardiologist decreased her dose of amlodipine due to leg swelling. Continue amlodipine.    10. Hyperlipidemia.  -Patient to continue taking pravastatin as prescribed.     11. Type 2 Diabetes.  -Last HbA1c was in 02/2022, and was 7.0 percent.   Patient to obtain labs today including an HbA1c. A prescription was sent to her preferred pharmacy today.   -Advised that she check her blood glucose levels at least once per day in the morning and in the evening. Order was put in to receive a new blood glucose meter to check her blood glucose levels.   - Continue Fortamet.  - Continue gabapentin for neuropathy.    12. Restless legs syndrome.  -Continue 0.5 mg dose of ropinirole.     13. Anxiety.  -Well-controlled on Effexor. Continue Effexor.    14. Urinary urgency, and frequency.  -Keep appointment with urologist for nerve stimulator placement.         Transcribed from ambient dictation for Ro Sawyer MD by Domitila Linares.  04/01/22   08:23 EDT

## 2022-04-07 ENCOUNTER — TELEPHONE (OUTPATIENT)
Dept: FAMILY MEDICINE CLINIC | Facility: CLINIC | Age: 72
End: 2022-04-07

## 2022-04-13 ENCOUNTER — LAB (OUTPATIENT)
Dept: LAB | Facility: HOSPITAL | Age: 72
End: 2022-04-13

## 2022-04-13 ENCOUNTER — HOSPITAL ENCOUNTER (OUTPATIENT)
Dept: GENERAL RADIOLOGY | Facility: HOSPITAL | Age: 72
Discharge: HOME | End: 2022-04-13

## 2022-04-13 DIAGNOSIS — Z01.818 PRE-OP TESTING: ICD-10-CM

## 2022-04-13 DIAGNOSIS — R94.30 ABNORMAL RESULTS OF CARDIOVASCULAR FUNCTION STUDIES: ICD-10-CM

## 2022-04-13 DIAGNOSIS — I20.9 ANGINA PECTORIS: ICD-10-CM

## 2022-04-13 DIAGNOSIS — I99.8 OTHER DISORDER OF CIRCULATORY SYSTEM: ICD-10-CM

## 2022-04-13 DIAGNOSIS — R06.02 SOB (SHORTNESS OF BREATH): ICD-10-CM

## 2022-04-13 LAB
ANION GAP SERPL CALCULATED.3IONS-SCNC: 13 MMOL/L (ref 5–15)
BUN SERPL-MCNC: 16 MG/DL (ref 8–23)
BUN/CREAT SERPL: 17 (ref 7–25)
CALCIUM SPEC-SCNC: 9.1 MG/DL (ref 8.6–10.5)
CHLORIDE SERPL-SCNC: 103 MMOL/L (ref 98–107)
CO2 SERPL-SCNC: 25 MMOL/L (ref 22–29)
CREAT SERPL-MCNC: 0.94 MG/DL (ref 0.57–1)
DEPRECATED RDW RBC AUTO: 41.6 FL (ref 37–54)
EGFRCR SERPLBLD CKD-EPI 2021: 64.6 ML/MIN/1.73
ERYTHROCYTE [DISTWIDTH] IN BLOOD BY AUTOMATED COUNT: 13.3 % (ref 12.3–15.4)
GLUCOSE SERPL-MCNC: 84 MG/DL (ref 65–99)
HCT VFR BLD AUTO: 34.6 % (ref 34–46.6)
HGB BLD-MCNC: 11.6 G/DL (ref 12–15.9)
INR PPP: 0.96 (ref 0.93–1.1)
MCH RBC QN AUTO: 29.7 PG (ref 26.6–33)
MCHC RBC AUTO-ENTMCNC: 33.5 G/DL (ref 31.5–35.7)
MCV RBC AUTO: 88.5 FL (ref 79–97)
PLATELET # BLD AUTO: 317 10*3/MM3 (ref 140–450)
PMV BLD AUTO: 7.4 FL (ref 6–12)
POTASSIUM SERPL-SCNC: 4.2 MMOL/L (ref 3.5–5.2)
PROTHROMBIN TIME: 9.9 SECONDS (ref 9.6–11.7)
RBC # BLD AUTO: 3.91 10*6/MM3 (ref 3.77–5.28)
SODIUM SERPL-SCNC: 141 MMOL/L (ref 136–145)
WBC NRBC COR # BLD: 7.2 10*3/MM3 (ref 3.4–10.8)

## 2022-04-13 PROCEDURE — C9803 HOPD COVID-19 SPEC COLLECT: HCPCS

## 2022-04-13 PROCEDURE — U0004 COV-19 TEST NON-CDC HGH THRU: HCPCS

## 2022-04-13 PROCEDURE — 71046 X-RAY EXAM CHEST 2 VIEWS: CPT

## 2022-04-13 PROCEDURE — 80048 BASIC METABOLIC PNL TOTAL CA: CPT

## 2022-04-13 PROCEDURE — 36415 COLL VENOUS BLD VENIPUNCTURE: CPT

## 2022-04-13 PROCEDURE — 85610 PROTHROMBIN TIME: CPT

## 2022-04-13 PROCEDURE — 85027 COMPLETE CBC AUTOMATED: CPT

## 2022-04-14 LAB — SARS-COV-2 ORF1AB RESP QL NAA+PROBE: NOT DETECTED

## 2022-04-15 ENCOUNTER — HOSPITAL ENCOUNTER (OUTPATIENT)
Facility: HOSPITAL | Age: 72
Setting detail: HOSPITAL OUTPATIENT SURGERY
Discharge: HOME | End: 2022-04-15
Attending: INTERNAL MEDICINE | Admitting: INTERNAL MEDICINE

## 2022-04-15 VITALS
WEIGHT: 177.91 LBS | OXYGEN SATURATION: 100 % | DIASTOLIC BLOOD PRESSURE: 52 MMHG | HEIGHT: 65 IN | TEMPERATURE: 98.1 F | HEART RATE: 63 BPM | RESPIRATION RATE: 18 BRPM | SYSTOLIC BLOOD PRESSURE: 156 MMHG | BODY MASS INDEX: 29.64 KG/M2

## 2022-04-15 DIAGNOSIS — I99.8 OTHER DISORDER OF CIRCULATORY SYSTEM: ICD-10-CM

## 2022-04-15 DIAGNOSIS — R06.02 SOB (SHORTNESS OF BREATH): ICD-10-CM

## 2022-04-15 DIAGNOSIS — Z01.818 PRE-OP TESTING: Primary | ICD-10-CM

## 2022-04-15 DIAGNOSIS — R94.30 ABNORMAL RESULTS OF CARDIOVASCULAR FUNCTION STUDIES: ICD-10-CM

## 2022-04-15 DIAGNOSIS — I20.9 ANGINA PECTORIS: ICD-10-CM

## 2022-04-15 LAB — GLUCOSE BLDC GLUCOMTR-MCNC: 125 MG/DL (ref 70–105)

## 2022-04-15 PROCEDURE — 99152 MOD SED SAME PHYS/QHP 5/>YRS: CPT | Performed by: INTERNAL MEDICINE

## 2022-04-15 PROCEDURE — 25010000002 MIDAZOLAM PER 1 MG: Performed by: INTERNAL MEDICINE

## 2022-04-15 PROCEDURE — C1894 INTRO/SHEATH, NON-LASER: HCPCS | Performed by: INTERNAL MEDICINE

## 2022-04-15 PROCEDURE — 93458 L HRT ARTERY/VENTRICLE ANGIO: CPT | Performed by: INTERNAL MEDICINE

## 2022-04-15 PROCEDURE — 25010000002 FENTANYL CITRATE (PF) 100 MCG/2ML SOLUTION: Performed by: INTERNAL MEDICINE

## 2022-04-15 PROCEDURE — S0260 H&P FOR SURGERY: HCPCS | Performed by: INTERNAL MEDICINE

## 2022-04-15 PROCEDURE — C1769 GUIDE WIRE: HCPCS | Performed by: INTERNAL MEDICINE

## 2022-04-15 PROCEDURE — 82962 GLUCOSE BLOOD TEST: CPT

## 2022-04-15 PROCEDURE — C1760 CLOSURE DEV, VASC: HCPCS | Performed by: INTERNAL MEDICINE

## 2022-04-15 PROCEDURE — 0 IOPAMIDOL PER 1 ML: Performed by: INTERNAL MEDICINE

## 2022-04-15 RX ORDER — ONDANSETRON 4 MG/1
4 TABLET, FILM COATED ORAL EVERY 6 HOURS PRN
Status: DISCONTINUED | OUTPATIENT
Start: 2022-04-15 | End: 2022-04-15 | Stop reason: HOSPADM

## 2022-04-15 RX ORDER — MECLIZINE HYDROCHLORIDE 25 MG/1
25 TABLET ORAL DAILY PRN
COMMUNITY
End: 2022-06-16

## 2022-04-15 RX ORDER — ONDANSETRON 2 MG/ML
4 INJECTION INTRAMUSCULAR; INTRAVENOUS EVERY 6 HOURS PRN
Status: DISCONTINUED | OUTPATIENT
Start: 2022-04-15 | End: 2022-04-15 | Stop reason: HOSPADM

## 2022-04-15 RX ORDER — FENTANYL CITRATE 50 UG/ML
INJECTION, SOLUTION INTRAMUSCULAR; INTRAVENOUS AS NEEDED
Status: DISCONTINUED | OUTPATIENT
Start: 2022-04-15 | End: 2022-04-15 | Stop reason: HOSPADM

## 2022-04-15 RX ORDER — LIDOCAINE HYDROCHLORIDE 20 MG/ML
INJECTION, SOLUTION INFILTRATION; PERINEURAL AS NEEDED
Status: DISCONTINUED | OUTPATIENT
Start: 2022-04-15 | End: 2022-04-15 | Stop reason: HOSPADM

## 2022-04-15 RX ORDER — GABAPENTIN 300 MG/1
300 CAPSULE ORAL 3 TIMES DAILY
COMMUNITY
End: 2022-05-06 | Stop reason: SDUPTHER

## 2022-04-15 RX ORDER — MIDAZOLAM HYDROCHLORIDE 1 MG/ML
INJECTION INTRAMUSCULAR; INTRAVENOUS AS NEEDED
Status: DISCONTINUED | OUTPATIENT
Start: 2022-04-15 | End: 2022-04-15 | Stop reason: HOSPADM

## 2022-04-15 RX ORDER — SODIUM CHLORIDE 9 MG/ML
30 INJECTION, SOLUTION INTRAVENOUS CONTINUOUS
Status: DISCONTINUED | OUTPATIENT
Start: 2022-04-15 | End: 2022-04-15 | Stop reason: HOSPADM

## 2022-04-15 RX ORDER — ACETAMINOPHEN 325 MG/1
650 TABLET ORAL EVERY 4 HOURS PRN
Status: DISCONTINUED | OUTPATIENT
Start: 2022-04-15 | End: 2022-04-15 | Stop reason: HOSPADM

## 2022-04-15 RX ORDER — LEVOTHYROXINE SODIUM 137 UG/1
137 TABLET ORAL DAILY
COMMUNITY
End: 2022-06-10 | Stop reason: SDUPTHER

## 2022-04-15 RX ORDER — DIPHENHYDRAMINE HCL 25 MG
25 CAPSULE ORAL EVERY 6 HOURS PRN
Status: DISCONTINUED | OUTPATIENT
Start: 2022-04-15 | End: 2022-04-15 | Stop reason: HOSPADM

## 2022-04-15 RX ADMIN — SODIUM CHLORIDE 30 ML/HR: 9 INJECTION, SOLUTION INTRAVENOUS at 08:03

## 2022-04-15 NOTE — DISCHARGE INSTRUCTIONS
Post Cath Instructions    Call Dr. Alvarez’s office to schedule a follow up appointment in 4 weeks at [phone].    Drink plenty of fluids for the next 24 hours.  This helps to eliminate the dye used in your procedure through urination.  You may resume a normal diet; however, try to avoid foods that would cause gas or constipation.    Sedative medication given to you during your catheterization may decrease your judgement and reaction time for up to 24-48 hours.  Therefore:  DO NOT drive or operate hazardous machinery (48 hours)  DO NOT consume alcoholic beverages  DO NOT make any important/legal decisions  Have someone stay with you for at least 24 hours    To allow proper healing and prevent bleeding, the following activities are to be strictly avoided for the next 24-48 hours:  Excessive bending at wound site  Straining (anything that would tense up muscles around the affected puncture site)  Lifting objects greater than 5 pounds, pushing, or pulling for 5 days  For Groin Cases:  Refrain from sexual activity  Refrain from running or vigorous walking  No prolonged sitting or standing  Limit stair climbing as much as possible    Keep the puncture site clean and dry.  You may remove the dressing tomorrow and replace it with a band-aid for at least one additional day.  Gently clean the site with mild soap and water.  No scrubbing/rubbing and lightly pat the area dry.  Showers are acceptable; however, avoid submerging in water (tub baths, hot tubs, swimming pools, dishwater, etc…) for at least one week.  The site should be completely healed before resuming these activities to reduce the risk of infection.  Check the site often.  Watch for signs and symptoms of infection and notify your physician if any of the following occur:  Bleeding or an increase in swelling at the puncture site  Fever  Increased soreness around puncture site  Foul odor or significant drainage from the puncture site  Swelling, redness, or  warmth at the puncture site    **A bruise or small “pea sized” lump under the skin at the puncture site is not unusual.  This should disappear within 3-4 weeks.**  CONTACT YOUR PHYSICIAN OR CALL 911 IF YOU EXPERIENCE ANY OF THE FOLLOWING:  Increased angina (chest pain) or frequent sensations of pressure, burning, pain, or other discomfort in the chest, arm, jaws, or stomach  Lightheadedness, dizziness, faint feeling, sweating, or difficulty breathing  Odd sensation changes like numbness, tingling, coldness, or pain in the arm or leg in which the catheter was inserted  Limb in which the catheter was inserted becomes pale/bluish in color    IMPORTANT:  Although this occurs very rarely, if you should develop bright red or excessive bleeding, feel a “pop” inside at the insertion site, or notice a sudden increase in swelling larger than a walnut, you should call 911.  Hold continuous firm pressure to the access site until emergency personnel arrive.  It is best if someone else can do this for you.

## 2022-04-15 NOTE — H&P
[facility] CARDIOLOGY    Ro Sawyer MD    CHIEF COMPLAINT:     Dizziness  Orthostasis  Hypertension  Frequent falls    HISTORY OF PRESENT ILLNESS:    71 y.o. female significant for history of hypertension hyperlipidemia hypothyroidism history of diabetes presented with symptoms of dizziness fatigue weakness frequent multiple falls     Interval History:  Prior history of dizziness but progressively getting worse  Worsening symptoms of orthostasis  Prior history of vertigo but that is resolved now she is having more lightheadedness and dizziness with frequent falls  Unsteady gait  Currently on same medications for blood pressure for last several years including high-dose Norvasc clonidine and low-dose ramipril  Orthostatic dizziness and presyncope  Frequent falls  Lower extremity edema  Shortness of breath and dyspnea on exertion  No palpitations  No prior cardiac work-up  Assessment & Plan     Impressions:  Dizziness  Orthostasis  Hypertension  Frequent falls  Diabetes mellitus  Hypothyroidism  Lower extremity edema  Shortness of breath  Dyspnea on exertion  Hyperlipidemia             Past Medical History:   Diagnosis Date   • B12 deficiency 7/8/2016    Last Assessment & Plan:  Formatting of this note might be different from the original.  Compliant and controlled with current medication B 12 supplements . Lab today B 12   • Back pain, chronic 7/8/2016    Last Assessment & Plan:  Formatting of this note might be different from the original. Will refer to PT   • Depression 7/8/2016    Last Assessment & Plan:  Formatting of this note might be different from the original.  Compliant and controlled with current medication   • Diabetic peripheral neuropathy ([facility]) 2/14/2020    Last Assessment & Plan:  Formatting of this note might be different from the original. Stable   • DM type 2, goal HbA1c < 7% ([facility]) 3/27/2017    Last Assessment & Plan:  Formatting of this note might be different from the  original.  Compliant and controlled with current diet.llabs today A1c   • Dyslipidemia 10/11/2017    Last Assessment & Plan:  Formatting of this note might be different from the original.  Compliant and controlled with current medication/statin therapy/labs today   • Environmental and seasonal allergies 10/11/2019   • Gastric reflux 3/27/2017   • HTN, goal below 130/80 2/10/2016    Last Assessment & Plan:  Formatting of this note might be different from the original. Compliant and controlled with current medication ramipril /labs today cmp to check renal function and electrolytes   • Hyperlipidemia    • Hypothyroidism 2014    Last Assessment & Plan:  Formatting of this note might be different from the original. TSH low reduce levothyroxine.  Will check TSH today.   • Insomnia 2015   • Lumbosacral radiculopathy 2020    Last Assessment & Plan:  Formatting of this note might be different from the original.  Compliant and controlled with current medication Muscle relaxer   • Osteoporosis 2020    Formatting of this note is different from the original. RADIOLOGY REPORT   FACILITY:  [facility] UN[facility]/AGE/GENDER: [id number]  OP      AGE:69 Y          SEX:F PATIENT NAME/:  GM DERAS    1950 UNIT NUMBER:  [id number] ACCOUNT NUMBER:  [id number] ACCESSION NUMBER:  [id number]     EXAMINATION: Bone densitometry of multiple sites, lumbar spine, left hip and distal   • Restless leg syndrome 2018    Last Assessment & Plan:  Formatting of this note might be different from the original.  Compliant and controlled with current medication requip     Past Surgical History:   Procedure Laterality Date   • BACK SURGERY  2007    lower   • COLON SURGERY  2006     Family History   Problem Relation Age of Onset   • Arthritis Mother    • Colon cancer Mother    • Thyroid disease Mother    • Stroke Mother      Social History     Tobacco Use   • Smoking status: Former Smoker      Packs/day: 2.00     Years: 20.00     Pack years: 40.00     Types: Cigarettes     Quit date:      Years since quittin.3   • Smokeless tobacco: Never Used   Vaping Use   • Vaping Use: Never used   Substance Use Topics   • Alcohol use: Yes     Comment: socially   • Drug use: Never     Comment: CBD oils     Medications Prior to Admission   Medication Sig Dispense Refill Last Dose   • amLODIPine (NORVASC) 5 MG tablet Take 1 tablet by mouth Daily. 90 tablet 3 2022 at Unknown time   • aspirin 81 MG EC tablet Take 81 mg by mouth Daily.   2022 at Unknown time   • buPROPion (WELLBUTRIN) 100 MG tablet Take 1 tablet by mouth 2 (Two) Times a Day. 180 tablet 0 2022 at Unknown time   • CBD (cannabidiol) oral oil Take 1 drop by mouth Daily As Needed.   Past Month at Unknown time   • cloNIDine (CATAPRES) 0.2 MG tablet Take 1 tablet by mouth Daily. 90 tablet 1 2022 at Unknown time   • gabapentin (NEURONTIN) 300 MG capsule Take 300 mg by mouth 3 (Three) Times a Day.   2022 at Unknown time   • hydrOXYzine (ATARAX) 10 MG tablet Take 1 tablet by mouth Daily. 90 tablet 1 2022 at Unknown time   • levothyroxine (SYNTHROID, LEVOTHROID) 137 MCG tablet Take 137 mcg by mouth Daily.   2022 at Unknown time   • meclizine (ANTIVERT) 25 MG tablet Take 25 mg by mouth Daily As Needed for Dizziness.   Past Month at Unknown time   • memantine (Namenda) 10 MG tablet Take 1 tablet by mouth 2 (Two) Times a Day. 180 tablet 1 2022 at Unknown time   • metFORMIN (Glucophage) 1000 MG tablet Take 1 tablet by mouth Daily With Breakfast. 90 tablet 1 2022 at Unknown time   • omeprazole (priLOSEC) 40 MG capsule Take 1 capsule by mouth Daily. 90 capsule 1 2022 at Unknown time   • pravastatin (Pravachol) 20 MG tablet Take 1 tablet by mouth Daily. 90 tablet 1 2022 at Unknown time   • ramipril (ALTACE) 2.5 MG capsule Take 2 capsules by mouth Daily. 90 capsule 1 2022 at Unknown time   • rOPINIRole  (REQUIP) 0.5 MG tablet Take 1 tablet by mouth every night at bedtime. 90 tablet 1 4/14/2022 at Unknown time   • venlafaxine XR (EFFEXOR-XR) 150 MG 24 hr capsule Take 1 capsule by mouth 2 (Two) Times a Day. 180 capsule 1 4/14/2022 at Unknown time   • vitamin B-12 (CYANOCOBALAMIN) 1000 MCG tablet Take 1,000 mcg by mouth Daily.   4/14/2022 at Unknown time   • Vitamin D, Cholecalciferol, 50 MCG (2000 UT) capsule Take 2,000 Units by mouth Daily.   4/14/2022 at Unknown time     Allergies:  Patient has no known allergies.    Immunization History   Administered Date(s) Administered   • COVID-19 (PFIZER) PURPLE CAP 01/31/2021, 02/22/2021   • Fluzone High Dose =>65 Years ([employer] ONLY) 02/27/2015, 09/26/2016, 10/11/2017, 09/21/2018, 10/11/2019   • Fluzone High-Dose 65+yrs 10/14/2020, 11/01/2021   • Influenza Whole 10/30/2008, 09/28/2009, 09/29/2011   • Pneumococcal Conjugate 13-Valent (PCV13) 04/29/2015   • Pneumococcal Polysaccharide (PPSV23) 11/13/2006, 03/27/2017   • Td, Unspecified 06/13/2018   • Tdap 05/08/2013           REVIEW OF SYSTEMS:   Review of Systems   Constitutional: Negative for chills, decreased appetite and malaise/fatigue.   HENT: Negative for congestion.    Eyes: Negative for blurred vision and double vision.   Cardiovascular: Positive for dyspnea on exertion. Negative for chest pain, irregular heartbeat, leg swelling, near-syncope and orthopnea.   Respiratory: Negative for cough and shortness of breath.    Hematologic/Lymphatic: Negative for adenopathy. Does not bruise/bleed easily.   Skin: Negative for rash.   Gastrointestinal: Negative for bloating and abdominal pain.   Neurological: Negative for dizziness and focal weakness.       Medication Review:  Scheduled Meds:   Continuous Infusions:sodium chloride, 30 mL/hr, Last Rate: 30 mL/hr (04/15/22 0803)      PRN Meds:.    Vital Signs  Visit Vitals  BP (!) 186/76 (BP Location: Right arm, Patient Position: Sitting)   Pulse 62   Temp 98.1 °F (36.7 °C) (Oral)  "  Resp 17   Ht 165.1 cm (65\")   Wt 80.7 kg (177 lb 14.6 oz)   SpO2 95%   BMI 29.61 kg/m²       Flowsheet Rows    Flowsheet Row First Filed Value   Admission Height 165.1 cm (65\") Documented at 04/15/2022 0757   Admission Weight 80.7 kg (177 lb 14.6 oz) Documented at 04/15/2022 0757           Physical Exam:  Constitutional:       Appearance: Well-developed.   Eyes:      Conjunctiva/sclera: Conjunctivae normal.      Pupils: Pupils are equal, round, and reactive to light.   HENT:      Head: Normocephalic and atraumatic.   Neck:      Thyroid: No thyromegaly.   Pulmonary:      Effort: Pulmonary effort is normal.      Breath sounds: Normal breath sounds.   Cardiovascular:      Normal rate. Regular rhythm.   Pulses:     Intact distal pulses.   Abdominal:      General: Bowel sounds are normal.      Palpations: Abdomen is soft.   Musculoskeletal:      Cervical back: Normal range of motion and neck supple. Skin:     General: Skin is warm.   Neurological:      Mental Status: Alert and oriented to person, place, and time.         Results Review:    I reviewed the patient's new clinical results.  Lab Results (most recent)     Procedure Component Value Units Date/Time    POC Glucose Once [[id number]]  (Abnormal) Collected: 04/15/22 0809    Specimen: Blood Updated: 04/15/22 0810     Glucose 125 mg/dL      Comment: Serial Number: [id number]:  [id number]             Imaging Results (Most Recent)     None        reviewed    ECG/EMG Results (most recent)     None        reviewed      Assessment/Plan     Patient Active Problem List   Diagnosis   • B12 deficiency   • Back pain, chronic   • Depression   • Diabetic peripheral neuropathy (HCC)   • Restless leg syndrome   • Osteoporosis   • OAB (overactive bladder)   • Lumbosacral radiculopathy   • Insomnia   • Hypothyroidism   • HTN, goal below 130/80   • Gastric reflux   • Environmental and seasonal allergies   • Dyslipidemia   • DM type 2, goal HbA1c < 7% ([facility])   • Angina pectoris " (HCC)   • SOB (shortness of breath)   • Abnormal results of cardiovascular function studies           Laila Alvarez MD  04/15/22  08:16 EDT

## 2022-04-26 RX ORDER — ROPINIROLE 0.5 MG/1
0.5 TABLET, FILM COATED ORAL
Qty: 90 TABLET | Refills: 1 | Status: SHIPPED | OUTPATIENT
Start: 2022-04-26 | End: 2023-02-28 | Stop reason: SDUPTHER

## 2022-04-26 NOTE — TELEPHONE ENCOUNTER
Caller: [facility] - [place], [place] - [address] - [phone] [facility] [phone] FX    Relationship: Pharmacy    Best call back number: [phone]    Requested Prescriptions:   Requested Prescriptions     Pending Prescriptions Disp Refills   • rOPINIRole (REQUIP) 0.5 MG tablet 90 tablet 1     Sig: Take 1 tablet by mouth every night at bedtime.        Pharmacy where request should be sent: [facility] - [place], [place] - [address] - [phone] [facility] [phone] FX  BEN BARTHOLOMEW [address] [place] [address] - [address] [place] [address] [address]  - [phone] [facility] [phone] FX     Additional details provided by patient: PATIENT IS OUT OF THIS MEDICATION    Does the patient have less than a 3 day supply:  [x] Yes  [] No    Yasmin Wilson Rep   04/26/22 08:52 EDT

## 2022-04-27 ENCOUNTER — TRANSCRIBE ORDERS (OUTPATIENT)
Dept: ADMINISTRATIVE | Facility: HOSPITAL | Age: 72
End: 2022-04-27

## 2022-04-27 ENCOUNTER — HOSPITAL ENCOUNTER (OUTPATIENT)
Dept: BONE DENSITY | Facility: HOSPITAL | Age: 72
Discharge: HOME | End: 2022-04-27

## 2022-04-27 ENCOUNTER — HOSPITAL ENCOUNTER (OUTPATIENT)
Dept: CARDIOLOGY | Facility: HOSPITAL | Age: 72
Discharge: HOME | End: 2022-04-27

## 2022-04-27 ENCOUNTER — HOSPITAL ENCOUNTER (OUTPATIENT)
Dept: ULTRASOUND IMAGING | Facility: HOSPITAL | Age: 72
End: 2022-04-27

## 2022-04-27 DIAGNOSIS — R42 DIZZINESS: ICD-10-CM

## 2022-04-27 DIAGNOSIS — R42 DIZZINESS: Primary | ICD-10-CM

## 2022-04-27 LAB
BH CV XLRA MEAS LEFT DIST CCA EDV: -22.7 CM/SEC
BH CV XLRA MEAS LEFT DIST CCA PSV: -94.1 CM/SEC
BH CV XLRA MEAS LEFT DIST ICA EDV: -24 CM/SEC
BH CV XLRA MEAS LEFT DIST ICA PSV: -98.8 CM/SEC
BH CV XLRA MEAS LEFT ICA/CCA RATIO: 1.12
BH CV XLRA MEAS LEFT MID CCA EDV: 15.7 CM/SEC
BH CV XLRA MEAS LEFT MID CCA PSV: 97.2 CM/SEC
BH CV XLRA MEAS LEFT PROX CCA EDV: 17.4 CM/SEC
BH CV XLRA MEAS LEFT PROX CCA PSV: 102 CM/SEC
BH CV XLRA MEAS LEFT PROX ECA PSV: 109 CM/SEC
BH CV XLRA MEAS LEFT PROX ICA EDV: 26.7 CM/SEC
BH CV XLRA MEAS LEFT PROX ICA PSV: 114 CM/SEC
BH CV XLRA MEAS LEFT PROX SCLA PSV: 202 CM/SEC
BH CV XLRA MEAS LEFT VERTEBRAL A EDV: 19.6 CM/SEC
BH CV XLRA MEAS LEFT VERTEBRAL A PSV: 78.4 CM/SEC
BH CV XLRA MEAS RIGHT DIST CCA EDV: 13.2 CM/SEC
BH CV XLRA MEAS RIGHT DIST CCA PSV: 64.2 CM/SEC
BH CV XLRA MEAS RIGHT DIST ICA EDV: -20.3 CM/SEC
BH CV XLRA MEAS RIGHT DIST ICA PSV: -81.8 CM/SEC
BH CV XLRA MEAS RIGHT ICA/CCA RATIO: 2.48
BH CV XLRA MEAS RIGHT PROX CCA EDV: -11 CM/SEC
BH CV XLRA MEAS RIGHT PROX CCA PSV: -64.2 CM/SEC
BH CV XLRA MEAS RIGHT PROX ECA PSV: 151 CM/SEC
BH CV XLRA MEAS RIGHT PROX ICA EDV: -31 CM/SEC
BH CV XLRA MEAS RIGHT PROX ICA PSV: -159 CM/SEC
BH CV XLRA MEAS RIGHT PROX SCLA PSV: 133 CM/SEC
BH CV XLRA MEAS RIGHT VERTEBRAL A EDV: 7.4 CM/SEC
BH CV XLRA MEAS RIGHT VERTEBRAL A PSV: 37.8 CM/SEC
LEFT ARM BP: NORMAL MMHG
MAXIMAL PREDICTED HEART RATE: 148 BPM
RIGHT ARM BP: NORMAL MMHG
STRESS TARGET HR: 126 BPM

## 2022-04-27 PROCEDURE — 93880 EXTRACRANIAL BILAT STUDY: CPT

## 2022-04-27 PROCEDURE — 77080 DXA BONE DENSITY AXIAL: CPT

## 2022-05-02 ENCOUNTER — TELEPHONE (OUTPATIENT)
Dept: FAMILY MEDICINE CLINIC | Facility: CLINIC | Age: 72
End: 2022-05-02

## 2022-05-02 NOTE — TELEPHONE ENCOUNTER
HUB to share    Dexa shows osteopenia  Continue calcium and vit d  US shows mild plaque buildup in carotids    LVM informing pt

## 2022-05-06 RX ORDER — GABAPENTIN 300 MG/1
300 CAPSULE ORAL 3 TIMES DAILY
Qty: 270 CAPSULE | Refills: 0 | Status: SHIPPED | OUTPATIENT
Start: 2022-05-06 | End: 2022-08-31 | Stop reason: SDUPTHER

## 2022-05-06 NOTE — TELEPHONE ENCOUNTER
Incoming Refill Request      Medication requested (name and dose): Gabapentin 300mg    Pharmacy where request should be sent: [facility]    Additional details provided by patient: Prev Dr. Sawyer Patient    Best call back number:     Does the patient have less than a 3 day supply:  [] Yes  [x] No    Carrie Meek, Yasmin Rep  05/06/22, 15:28 EDT

## 2022-06-02 ENCOUNTER — OFFICE VISIT (OUTPATIENT)
Dept: FAMILY MEDICINE CLINIC | Facility: CLINIC | Age: 72
End: 2022-06-02

## 2022-06-02 VITALS
SYSTOLIC BLOOD PRESSURE: 155 MMHG | DIASTOLIC BLOOD PRESSURE: 80 MMHG | OXYGEN SATURATION: 93 % | WEIGHT: 180 LBS | BODY MASS INDEX: 29.99 KG/M2 | HEART RATE: 62 BPM | HEIGHT: 65 IN

## 2022-06-02 DIAGNOSIS — Z00.00 PREVENTATIVE HEALTH CARE: ICD-10-CM

## 2022-06-02 DIAGNOSIS — G62.9 NEUROPATHY: ICD-10-CM

## 2022-06-02 DIAGNOSIS — G25.81 RESTLESS LEG SYNDROME: ICD-10-CM

## 2022-06-02 DIAGNOSIS — N32.81 OAB (OVERACTIVE BLADDER): ICD-10-CM

## 2022-06-02 DIAGNOSIS — I25.10 CORONARY ARTERY DISEASE INVOLVING NATIVE HEART WITHOUT ANGINA PECTORIS, UNSPECIFIED VESSEL OR LESION TYPE: ICD-10-CM

## 2022-06-02 DIAGNOSIS — F41.9 ANXIETY: ICD-10-CM

## 2022-06-02 DIAGNOSIS — R41.89 COGNITIVE IMPAIRMENT: ICD-10-CM

## 2022-06-02 DIAGNOSIS — E11.69 TYPE 2 DIABETES MELLITUS WITH OTHER SPECIFIED COMPLICATION, WITHOUT LONG-TERM CURRENT USE OF INSULIN: ICD-10-CM

## 2022-06-02 DIAGNOSIS — I10 PRIMARY HYPERTENSION: ICD-10-CM

## 2022-06-02 DIAGNOSIS — R42 DIZZINESS: Primary | ICD-10-CM

## 2022-06-02 DIAGNOSIS — E03.9 ACQUIRED HYPOTHYROIDISM: ICD-10-CM

## 2022-06-02 DIAGNOSIS — E55.9 VITAMIN D DEFICIENCY: ICD-10-CM

## 2022-06-02 PROBLEM — E11.9 DIABETES MELLITUS: Status: ACTIVE | Noted: 2022-06-02

## 2022-06-02 LAB — HBA1C MFR BLD: 6.9 % (ref 3.5–5.6)

## 2022-06-02 PROCEDURE — 82306 VITAMIN D 25 HYDROXY: CPT | Performed by: NURSE PRACTITIONER

## 2022-06-02 PROCEDURE — 99214 OFFICE O/P EST MOD 30 MIN: CPT | Performed by: NURSE PRACTITIONER

## 2022-06-02 PROCEDURE — 84443 ASSAY THYROID STIM HORMONE: CPT | Performed by: NURSE PRACTITIONER

## 2022-06-02 PROCEDURE — 82043 UR ALBUMIN QUANTITATIVE: CPT | Performed by: NURSE PRACTITIONER

## 2022-06-02 PROCEDURE — 84481 FREE ASSAY (FT-3): CPT | Performed by: NURSE PRACTITIONER

## 2022-06-02 PROCEDURE — 80061 LIPID PANEL: CPT | Performed by: NURSE PRACTITIONER

## 2022-06-02 PROCEDURE — 82607 VITAMIN B-12: CPT | Performed by: NURSE PRACTITIONER

## 2022-06-02 PROCEDURE — 80053 COMPREHEN METABOLIC PANEL: CPT | Performed by: NURSE PRACTITIONER

## 2022-06-02 PROCEDURE — 83036 HEMOGLOBIN GLYCOSYLATED A1C: CPT | Performed by: NURSE PRACTITIONER

## 2022-06-02 PROCEDURE — 85027 COMPLETE CBC AUTOMATED: CPT | Performed by: NURSE PRACTITIONER

## 2022-06-02 PROCEDURE — 84439 ASSAY OF FREE THYROXINE: CPT | Performed by: NURSE PRACTITIONER

## 2022-06-02 RX ORDER — MIRABEGRON 25 MG/1
TABLET, FILM COATED, EXTENDED RELEASE ORAL
COMMUNITY
Start: 2022-05-01 | End: 2022-06-02

## 2022-06-02 RX ORDER — AMLODIPINE BESYLATE 10 MG/1
TABLET ORAL
COMMUNITY
Start: 2022-05-06 | End: 2022-06-02

## 2022-06-02 RX ORDER — METFORMIN HYDROCHLORIDE EXTENDED-RELEASE TABLETS 1000 MG/1
TABLET, FILM COATED, EXTENDED RELEASE ORAL
COMMUNITY
Start: 2022-05-16 | End: 2022-06-02

## 2022-06-02 RX ORDER — CEPHALEXIN 500 MG/1
CAPSULE ORAL
COMMUNITY
Start: 2022-05-10 | End: 2022-06-02

## 2022-06-02 RX ORDER — AMLODIPINE BESYLATE 5 MG/1
5 TABLET ORAL DAILY
Qty: 90 TABLET | Refills: 3
Start: 2022-06-02

## 2022-06-02 RX ORDER — DONEPEZIL HYDROCHLORIDE 5 MG/1
5 TABLET, FILM COATED ORAL NIGHTLY
Qty: 30 TABLET | Refills: 1 | Status: SHIPPED | OUTPATIENT
Start: 2022-06-02 | End: 2022-08-15 | Stop reason: SDUPTHER

## 2022-06-02 NOTE — PROGRESS NOTES
Chief Complaint  Establish Care (Pt. Has vertigo./Pt. States it's more of a balance issue more than dizziness recently)    Subjective        Radha Barbour presents to [facility]  History of Present Illness    Patient presents to establish care with provider.     Patient has urinary retention, bladder stimulator placed by Dr. Crespo on 5/12. Dobbs catheter intact. Patient will f/u with Urology in June. Catheter to remain intact until she follows up.     Patient takes Meclizine PRN dizziness. She has had feelings of imbalance for years. Patient has the sensation that the room is spinning and her feet will not work. Family reports she will occasionally have slurred words. Patient's MRI in March showed mild small vessel changes. Carotid doppler showed mild to moderate stenosis. Cardiac cath completed on 4/15 , showed moderate obstructive CAD with plan for medical management. Patient is taking Namenda 10mg daily for dementia. Family reports increasing confusion and difficulty with memory.     Patient is taking Norvasc 5mg daily, Clonidine 0.2mg and rampril 5mg daily for hypertension. Medication recently decreased per Cardiology due to BLE swelling. Swelling has improved. Systolic BP slightly elevated, but stable.  Patient is taking pravastatin 20 mg daily for hyperlipidemia.  Patient denies headache, chest pain, or vision changes.      Patient is taking Wellbutrin 100mg BID as well as Effexor 150mg BID for depression/anxiety, reports symptoms are stable. She uses CBD oil nightly to help with anxiety.  Patient does have difficulty falling asleep and staying asleep.    Patient is taking Metformin 1000mg for DMII. She does not check glucose levels regularly. Patient does not follow ADA diet. Last A1C was 6.6.     Patient is taking Synthroid 137mcg M-F then doubles dose on Saturday and Sunday.  Dose recently adjusted, TSH last checked in March.    Patient is taking Requip 0.5mg nightly for  "RLS, reports symptoms are stable.       Objective   Vital Signs:  /80   Pulse 62   Ht 165.1 cm (65\")   Wt 81.6 kg (180 lb)   SpO2 93%   BMI 29.95 kg/m²           Physical Exam  Constitutional:       Appearance: Normal appearance.   HENT:      Head: Normocephalic.   Cardiovascular:      Rate and Rhythm: Normal rate and regular rhythm.   Pulmonary:      Effort: Pulmonary effort is normal.      Breath sounds: Normal breath sounds.   Abdominal:      General: Abdomen is flat. Bowel sounds are normal.      Palpations: Abdomen is soft.   Musculoskeletal:         General: Normal range of motion.      Cervical back: Neck supple.      Right lower leg: No edema.      Left lower leg: No edema.   Skin:     General: Skin is warm and dry.   Neurological:      Mental Status: She is alert and oriented to person, place, and time.      Gait: Gait is intact.   Psychiatric:         Attention and Perception: Attention normal.         Mood and Affect: Mood normal.         Speech: Speech normal.        Result Review :    CMP    CMP 12/9/21 3/31/22 4/13/22   Glucose 119 (A) 93 84   BUN 16 15 16   Creatinine 0.77 0.90 0.94   eGFR Non African Am 74     Sodium 142 142 141   Potassium 4.7 4.4 4.2   Chloride 102 103 103   Calcium 9.7 9.4 9.1   Albumin 4.50     Total Bilirubin 0.2     Alkaline Phosphatase 97     AST (SGOT) 16     ALT (SGPT) 15     (A) Abnormal value            CBC    CBC 3/31/22 4/13/22   WBC 6.77 7.20   RBC 3.80 3.91   Hemoglobin 11.2 (A) 11.6 (A)   Hematocrit 34.1 34.6   MCV 89.7 88.5   MCH 29.5 29.7   MCHC 32.8 33.5   RDW 12.3 13.3   Platelets 318 317   (A) Abnormal value                TSH    TSH 12/9/21 2/21/22 3/31/22   TSH 0.182 (A) 4.920 (A) 0.375   (A) Abnormal value            Most Recent A1C    HGBA1C Most Recent 12/9/21   Hemoglobin A1C 6.6 (A)   (A) Abnormal value                      Assessment and Plan   Diagnoses and all orders for this visit:    1. Dizziness (Primary)  Assessment & Plan:  1.  Reviewed " carotid Doppler  2.  Refer to neurology for further evaluation    Orders:  -     Ambulatory Referral to Neurology    2. Acquired hypothyroidism  Assessment & Plan:  1.  Continue Synthroid as prescribed  2.  Check labs today    Orders:  -     TSH  -     T4, Free  -     T3, Free  -     Cancel: T3, Free  -     Cancel: T4, Free    3. Cognitive impairment  Assessment & Plan:  1.  Continue Namenda as prescribed  2.  Add Aricept 5 mg nightly  3.  Refer to neurology    Orders:  -     Ambulatory Referral to Neurology    4. Vitamin D deficiency  -     Vitamin D 25 Hydroxy    5. Type 2 diabetes mellitus with other specified complication, without long-term current use of insulin (HCC)  Assessment & Plan:  1.  Continue metformin as prescribed  2.  Encourage patient to schedule annual eye exam  3.  Check labs today    Orders:  -     Hemoglobin A1c  -     CBC (No Diff)  -     Comprehensive Metabolic Panel  -     MicroAlbumin, Urine, Random - Urine, Clean Catch    6. Primary hypertension  Assessment & Plan:  1.  Continue medications as prescribed  2.  Follow-up with cardiology      7. Anxiety  Assessment & Plan:  1.  Continue Wellbutrin and Effexor as prescribed  2.  May use CBD oil nightly  3.  Patient may try melatonin 5 mg nightly as needed for insomnia      8. Neuropathy  Assessment & Plan:  1.  Continue gabapentin as prescribed    Orders:  -     Vitamin B12    9. Preventative health care  Assessment & Plan:  1.  Mammogram due in July according to patient, [facility] will send out reminder      Orders:  -     Lipid Panel    10. Coronary artery disease involving native heart without angina pectoris, unspecified vessel or lesion type  Assessment & Plan:  1.  Reviewed cardiac testing  2.  Continue ASA  3.  Follow-up with cardiology      11. Restless leg syndrome  Assessment & Plan:  1.  Continue Requip as prescribed      12. OAB (overactive bladder)  Assessment & Plan:  1.  Follow-up with urology      Other orders  -      amLODIPine (NORVASC) 5 MG tablet; Take 1 tablet by mouth Daily.  Dispense: 90 tablet; Refill: 3  -     donepezil (Aricept) 5 MG tablet; Take 1 tablet by mouth Every Night.  Dispense: 30 tablet; Refill: 1  -     metFORMIN (Glucophage) 1000 MG tablet; Take 1 tablet by mouth Daily With Breakfast.  Dispense: 90 tablet; Refill: 1         I spent 35 minutes caring for Radha on this date of service. This time includes time spent by me in the following activities:preparing for the visit, reviewing tests, obtaining and/or reviewing a separately obtained history, performing a medically appropriate examination and/or evaluation , counseling and educating the patient/family/caregiver, ordering medications, tests, or procedures, referring and communicating with other health care professionals , documenting information in the medical record, independently interpreting results and communicating that information with the patient/family/caregiver and care coordination  Follow Up   Return in about 6 weeks (around 7/14/2022) for cognitive impairment.  Patient was given instructions and counseling regarding her condition or for health maintenance advice. Please see specific information pulled into the AVS if appropriate.

## 2022-06-02 NOTE — ASSESSMENT & PLAN NOTE
1.  Continue Wellbutrin and Effexor as prescribed  2.  May use CBD oil nightly  3.  Patient may try melatonin 5 mg nightly as needed for insomnia

## 2022-06-03 LAB
25(OH)D3 SERPL-MCNC: 32.9 NG/ML (ref 30–100)
ALBUMIN SERPL-MCNC: 4.6 G/DL (ref 3.5–5.2)
ALBUMIN UR-MCNC: 23 MG/DL
ALBUMIN/GLOB SERPL: 2 G/DL
ALP SERPL-CCNC: 89 U/L (ref 39–117)
ALT SERPL W P-5'-P-CCNC: 17 U/L (ref 1–33)
ANION GAP SERPL CALCULATED.3IONS-SCNC: 11.9 MMOL/L (ref 5–15)
AST SERPL-CCNC: 16 U/L (ref 1–32)
BILIRUB SERPL-MCNC: 0.4 MG/DL (ref 0–1.2)
BUN SERPL-MCNC: 16 MG/DL (ref 8–23)
BUN/CREAT SERPL: 16 (ref 7–25)
CALCIUM SPEC-SCNC: 9 MG/DL (ref 8.6–10.5)
CHLORIDE SERPL-SCNC: 100 MMOL/L (ref 98–107)
CHOLEST SERPL-MCNC: 187 MG/DL (ref 0–200)
CO2 SERPL-SCNC: 26.1 MMOL/L (ref 22–29)
CREAT SERPL-MCNC: 1 MG/DL (ref 0.57–1)
DEPRECATED RDW RBC AUTO: 40 FL (ref 37–54)
EGFRCR SERPLBLD CKD-EPI 2021: 60 ML/MIN/1.73
ERYTHROCYTE [DISTWIDTH] IN BLOOD BY AUTOMATED COUNT: 12.8 % (ref 12.3–15.4)
GLOBULIN UR ELPH-MCNC: 2.3 GM/DL
GLUCOSE SERPL-MCNC: 103 MG/DL (ref 65–99)
HCT VFR BLD AUTO: 36.1 % (ref 34–46.6)
HDLC SERPL-MCNC: 106 MG/DL (ref 40–60)
HGB BLD-MCNC: 12 G/DL (ref 12–15.9)
LDLC SERPL CALC-MCNC: 63 MG/DL (ref 0–100)
LDLC/HDLC SERPL: 0.56 {RATIO}
MCH RBC QN AUTO: 28.9 PG (ref 26.6–33)
MCHC RBC AUTO-ENTMCNC: 33.2 G/DL (ref 31.5–35.7)
MCV RBC AUTO: 87 FL (ref 79–97)
PLATELET # BLD AUTO: 271 10*3/MM3 (ref 140–450)
PMV BLD AUTO: 10 FL (ref 6–12)
POTASSIUM SERPL-SCNC: 4.3 MMOL/L (ref 3.5–5.2)
PROT SERPL-MCNC: 6.9 G/DL (ref 6–8.5)
RBC # BLD AUTO: 4.15 10*6/MM3 (ref 3.77–5.28)
SODIUM SERPL-SCNC: 138 MMOL/L (ref 136–145)
T3FREE SERPL-MCNC: 1.76 PG/ML (ref 2–4.4)
T4 FREE SERPL-MCNC: 0.83 NG/DL (ref 0.93–1.7)
TRIGL SERPL-MCNC: 109 MG/DL (ref 0–150)
TSH SERPL DL<=0.05 MIU/L-ACNC: 34.8 UIU/ML (ref 0.27–4.2)
VIT B12 BLD-MCNC: 1123 PG/ML (ref 211–946)
VLDLC SERPL-MCNC: 18 MG/DL (ref 5–40)
WBC NRBC COR # BLD: 8.61 10*3/MM3 (ref 3.4–10.8)

## 2022-06-03 NOTE — PROGRESS NOTES
Patient's TSH is very elevated, indicating underactive thyroid.  I would like them to verify that patient has a correct dose of 137 mcg and is taking as prescribed.  If so, we will need to increase her dose and I would recommend a referral to endocrinology.  A1c is 6.9, stable.  The rest of her labs look good.

## 2022-06-07 ENCOUNTER — TELEPHONE (OUTPATIENT)
Dept: FAMILY MEDICINE CLINIC | Facility: CLINIC | Age: 72
End: 2022-06-07

## 2022-06-07 NOTE — TELEPHONE ENCOUNTER
----- Message from GINA Minaya sent at 6/3/2022  7:54 AM EDT -----  Patient's TSH is very elevated, indicating underactive thyroid.  I would like them to verify that patient has a correct dose of 137 mcg and is taking as prescribed.  If so, we will need to increase her dose and I would recommend a referral to endocrinology.  A1c is 6.9, stable.  The rest of her labs look good.

## 2022-06-07 NOTE — TELEPHONE ENCOUNTER
"Hub ok to share:    \"Patient's TSH is very elevated, indicating underactive thyroid.  Is pt taking levothyroxine 137 mcg daily as prescribed?  If so, we will need to increase her dose and I would recommend a referral to endocrinology.  A1c is 6.9, stable.  The rest of her labs look good.\"  "

## 2022-06-10 ENCOUNTER — TELEPHONE (OUTPATIENT)
Dept: FAMILY MEDICINE CLINIC | Facility: CLINIC | Age: 72
End: 2022-06-10

## 2022-06-10 DIAGNOSIS — E03.9 ACQUIRED HYPOTHYROIDISM: Primary | ICD-10-CM

## 2022-06-10 RX ORDER — LEVOTHYROXINE SODIUM 0.15 MG/1
150 TABLET ORAL DAILY
Qty: 30 TABLET | Refills: 1 | Status: SHIPPED | OUTPATIENT
Start: 2022-06-10 | End: 2022-08-23

## 2022-06-10 NOTE — TELEPHONE ENCOUNTER
Pt's daughter returned call about labs and said she takes levothyroxine 137 Monday-Friday just once daily, then takes 2 tablets of the 137 on Saturday and Sunday.  She is ok with endocrinology referral.

## 2022-06-10 NOTE — TELEPHONE ENCOUNTER
Referral placed and I am increasing her Synthroid to 150 mcg daily.  I would like her thyroid panel repeated in 1 month

## 2022-06-10 NOTE — TELEPHONE ENCOUNTER
"Hub ok to share with Gemmabarbara:    \"Referral placed and I am increasing her Synthroid to 150 mcg daily.  I would like her thyroid panel repeated in 1 month\"  "

## 2022-06-14 RX ORDER — BUPROPION HYDROCHLORIDE 100 MG/1
TABLET ORAL
Qty: 180 TABLET | Refills: 0 | OUTPATIENT
Start: 2022-06-14

## 2022-06-14 RX ORDER — BUPROPION HYDROCHLORIDE 100 MG/1
100 TABLET ORAL 2 TIMES DAILY
Qty: 180 TABLET | Refills: 0 | Status: SHIPPED | OUTPATIENT
Start: 2022-06-14 | End: 2022-10-27 | Stop reason: SDUPTHER

## 2022-06-16 RX ORDER — MECLIZINE HYDROCHLORIDE 25 MG/1
TABLET ORAL
Qty: 180 TABLET | Refills: 0 | Status: SHIPPED | OUTPATIENT
Start: 2022-06-16 | End: 2022-12-08 | Stop reason: SDUPTHER

## 2022-06-21 ENCOUNTER — OFFICE VISIT (OUTPATIENT)
Dept: CARDIOLOGY | Facility: CLINIC | Age: 72
End: 2022-06-21

## 2022-06-21 VITALS
DIASTOLIC BLOOD PRESSURE: 75 MMHG | SYSTOLIC BLOOD PRESSURE: 116 MMHG | WEIGHT: 180 LBS | HEART RATE: 61 BPM | HEIGHT: 65 IN | BODY MASS INDEX: 29.99 KG/M2 | OXYGEN SATURATION: 92 %

## 2022-06-21 DIAGNOSIS — E78.5 DYSLIPIDEMIA: Primary | ICD-10-CM

## 2022-06-21 DIAGNOSIS — I10 PRIMARY HYPERTENSION: ICD-10-CM

## 2022-06-21 DIAGNOSIS — I25.10 CORONARY ARTERY DISEASE INVOLVING NATIVE CORONARY ARTERY OF NATIVE HEART WITHOUT ANGINA PECTORIS: ICD-10-CM

## 2022-06-21 DIAGNOSIS — R94.30 ABNORMAL RESULTS OF CARDIOVASCULAR FUNCTION STUDIES: ICD-10-CM

## 2022-06-21 PROCEDURE — 99214 OFFICE O/P EST MOD 30 MIN: CPT | Performed by: NURSE PRACTITIONER

## 2022-06-21 NOTE — PROGRESS NOTES
[facility] CARDIOLOGY      REASON FOR FOLLOW-UP:  Follow-up heart catheterization          Chief Complaint   Patient presents with   • Hypertension   • Hyperlipidemia   • Dizziness         Dear Michell Ramirez APRN        History of Present Illness   It was my pleasure to see Ms. Barbour in the office today.  She is a 71-year-old female with significant for history of hypertension, hyperlipidemia, hypothyroidism, history of diabetes.  She was seen in evaluation for symptoms of dizziness, fatigue, weakness, frequent multiple falls.  Conservative medical management was initiated with decreasing dose of Norvasc, initiation of MARGAUX hose and abdominal binder.  Full ischemic work-up was ordered including 2D echocardiogram that showed normal LV systolic function with EF 55%, mild pulmonary hypertension, moderate aortic valve stenosis.  Nuclear stress testing with moderate sized severe intensity reversible ischemia involving the anterior and anterior apical wall.  She was subsequently scheduled for heart catheterization that showed moderate obstructive disease involving mid LAD, mid RCA-plan to medically manage.  Mild obstructive disease in the distal circumflex.  She presents in follow-up from that procedure.    Today, Ms. Barbour reports that she does feel better.  She takes as needed meclizine for her dizziness which does help.  She denies any chest discomfort, shortness of breath, lower extremity edema.  Recent labs reviewed that showed elevated TSH at 34.8.  Lipid panel improved from prior with , LDL 63.  CBC and CMP unremarkable.     Heart cath 4/15/2022: Moderate obstructive coronary artery disease involving the mid LAD and mid right coronary artery plan to manage conservatively with medical therapy and continued aggressive risk factor modification  Mild obstructive coronary artery disease involving the distal circumflex artery  Normal LV systolic function estimate LV ejection  fraction of 50%  Normal left-sided filling pressures        ASSESSMENT:  Dizziness  Orthostasis  Primary hypertension  Frequent falls  Diabetes mellitus 2  Hypothyroidism-uncontrolled  Coronary artery disease        PLAN:  Continue current CV plan of care  Continue meclizine as needed for vertigo/dizziness  Follow-up with primary cardiologist in 3 months        The following portions of the patient's history were reviewed and updated as appropriate: allergies, current medications, past family history, past medical history, past social history, past surgical history and problem list.    REVIEW OF SYSTEMS:    Review of Systems   Neurological: Positive for dizziness.   All other systems reviewed and are negative.      Vitals:    06/21/22 1433   BP: 116/75   Pulse: 61   SpO2: 92%         PHYSICAL EXAM:    General: Alert, cooperative, no distress, appears stated age  Head:  Normocephalic, atraumatic, mucous membranes moist  Eyes:  Conjunctiva/corneas clear, EOM's intact     Neck:  Supple,  no JVD or bruit     Lungs: Clear to auscultation bilaterally, no wheezes rhonchi rales are noted  Chest wall: No tenderness  Musculoskeletal:   Ambulates freely without assistance  Heart::  Regular rate and rhythm, S1 and S2 normal, no murmur, rub or gallop  Abdomen: Soft, non-tender, nondistended, bowel sounds active, no abdominal bruit  Extremities: No cyanosis, clubbing, or edema   Pulses: 2+ and symmetric all extremities  Skin:  No rashes or lesions  Neuro/psych: A&O x3. CN II through XII are grossly intact with appropriate affect        Past Medical History:   Diagnosis Date   • Arthritis 1998   • B12 deficiency 07/08/2016    Last Assessment & Plan:  Formatting of this note might be different from the original.  Compliant and controlled with current medication B 12 supplements . Lab today B 12   • Back pain, chronic 07/08/2016    Last Assessment & Plan:  Formatting of this note might be different from the original. Will refer to  PT   • Colon polyp     Colon Cancer   • Depression 2016    Last Assessment & Plan:  Formatting of this note might be different from the original.  Compliant and controlled with current medication   • Diabetic peripheral neuropathy (HCC) 2020    Last Assessment & Plan:  Formatting of this note might be different from the original. Stable   • DM type 2, goal HbA1c < 7% (HCC) 2017    Last Assessment & Plan:  Formatting of this note might be different from the original.  Compliant and controlled with current diet.llabs today A1c   • Dyslipidemia 10/11/2017    Last Assessment & Plan:  Formatting of this note might be different from the original.  Compliant and controlled with current medication/statin therapy/labs today   • Environmental and seasonal allergies 10/11/2019   • Gastric reflux 2017   • HTN, goal below 130/80 02/10/2016    Last Assessment & Plan:  Formatting of this note might be different from the original. Compliant and controlled with current medication ramipril /labs today cmp to check renal function and electrolytes   • Hyperlipidemia    • Hypothyroidism 2014    Last Assessment & Plan:  Formatting of this note might be different from the original. TSH low reduce levothyroxine.  Will check TSH today.   • Insomnia 2015   • Lumbosacral radiculopathy 2020    Last Assessment & Plan:  Formatting of this note might be different from the original.  Compliant and controlled with current medication Muscle relaxer   • Osteoporosis 2020    Formatting of this note is different from the original. RADIOLOGY REPORT   FACILITY:  [facility] UNIT/AGE/GENDER: MARTÍNCMACLRK  OP      AGE:69 Y          SEX:F PATIENT NAME/:  GM DERAS    1950 UNIT NUMBER:  [id number] ACCOUNT NUMBER:  [id number] ACCESSION NUMBER:  [id number]     EXAMINATION: Bone densitometry of multiple sites, lumbar spine, left hip and distal   • Restless leg syndrome  09/21/2018    Last Assessment & Plan:  Formatting of this note might be different from the original.  Compliant and controlled with current medication requip       Past Surgical History:   Procedure Laterality Date   • BACK SURGERY  2007    lower   • CARDIAC CATHETERIZATION Right 04/15/2022    Procedure: Left Heart Cath;  Surgeon: Laila Alvarez MD;  Location: [facility] CATH INVASIVE LOCATION;  Service: Cardiovascular;  Laterality: Right;   • CARDIAC CATHETERIZATION  4/15/2022   • COLON SURGERY  2006         Current Outpatient Medications:   •  amLODIPine (NORVASC) 5 MG tablet, Take 1 tablet by mouth Daily., Disp: 90 tablet, Rfl: 3  •  aspirin 81 MG EC tablet, Take 81 mg by mouth Daily., Disp: , Rfl:   •  buPROPion (WELLBUTRIN) 100 MG tablet, Take 1 tablet by mouth 2 (Two) Times a Day., Disp: 180 tablet, Rfl: 0  •  CBD (cannabidiol) oral oil, Take 1 drop by mouth Daily As Needed., Disp: , Rfl:   •  cloNIDine (CATAPRES) 0.2 MG tablet, Take 1 tablet by mouth Daily., Disp: 90 tablet, Rfl: 1  •  donepezil (Aricept) 5 MG tablet, Take 1 tablet by mouth Every Night., Disp: 30 tablet, Rfl: 1  •  gabapentin (NEURONTIN) 300 MG capsule, Take 1 capsule by mouth 3 (Three) Times a Day., Disp: 270 capsule, Rfl: 0  •  hydrOXYzine (ATARAX) 10 MG tablet, Take 1 tablet by mouth Daily., Disp: 90 tablet, Rfl: 1  •  levothyroxine (SYNTHROID, LEVOTHROID) 150 MCG tablet, Take 1 tablet by mouth Daily., Disp: 30 tablet, Rfl: 1  •  memantine (Namenda) 10 MG tablet, Take 1 tablet by mouth 2 (Two) Times a Day., Disp: 180 tablet, Rfl: 1  •  metFORMIN (Glucophage) 1000 MG tablet, Take 1 tablet by mouth Daily With Breakfast., Disp: 90 tablet, Rfl: 1  •  omeprazole (priLOSEC) 40 MG capsule, Take 1 capsule by mouth Daily., Disp: 90 capsule, Rfl: 1  •  pravastatin (Pravachol) 20 MG tablet, Take 1 tablet by mouth Daily., Disp: 90 tablet, Rfl: 1  •  ramipril (ALTACE) 2.5 MG capsule, Take 2 capsules by mouth Daily., Disp: 90 capsule, Rfl: 1  •   "rOPINIRole (REQUIP) 0.5 MG tablet, Take 1 tablet by mouth every night at bedtime., Disp: 90 tablet, Rfl: 1  •  venlafaxine XR (EFFEXOR-XR) 150 MG 24 hr capsule, Take 1 capsule by mouth 2 (Two) Times a Day., Disp: 180 capsule, Rfl: 1  •  vitamin B-12 (CYANOCOBALAMIN) 1000 MCG tablet, Take 1,000 mcg by mouth Daily., Disp: , Rfl:   •  Vitamin D, Cholecalciferol, 50 MCG (2000 UT) capsule, Take 2,000 Units by mouth Daily., Disp: , Rfl:   •  meclizine (ANTIVERT) 25 MG tablet, TAKE 1 TABLET THREE TIMES DAILY AS NEEDED FOR DIZZINESS, Disp: 180 tablet, Rfl: 0    No Known Allergies    Family History   Problem Relation Age of Onset   • Arthritis Mother    • Colon cancer Mother    • Thyroid disease Mother    • Stroke Mother    • Cancer Mother         Colon   • Heart disease Sister    • Heart disease Sister        Social History     Tobacco Use   • Smoking status: Former Smoker     Packs/day: 2.00     Years: 20.00     Pack years: 40.00     Types: Cigarettes, Cigarettes     Quit date: 1998     Years since quittin.4   • Smokeless tobacco: Never Used   Substance Use Topics   • Alcohol use: Yes     Comment: socially           Current Electrocardiogram:  Procedures        EMR Dragon/Transcription:   \"Dictated utilizing Dragon dictation\".       "

## 2022-06-28 RX ORDER — OMEPRAZOLE 40 MG/1
40 CAPSULE, DELAYED RELEASE ORAL DAILY
Qty: 90 CAPSULE | Refills: 1 | Status: SHIPPED | OUTPATIENT
Start: 2022-06-28 | End: 2022-06-29 | Stop reason: SDUPTHER

## 2022-06-28 RX ORDER — VENLAFAXINE HYDROCHLORIDE 150 MG/1
150 CAPSULE, EXTENDED RELEASE ORAL 2 TIMES DAILY
Qty: 180 CAPSULE | Refills: 1 | Status: SHIPPED | OUTPATIENT
Start: 2022-06-28 | End: 2022-06-29 | Stop reason: SDUPTHER

## 2022-06-29 RX ORDER — VENLAFAXINE HYDROCHLORIDE 150 MG/1
150 CAPSULE, EXTENDED RELEASE ORAL 2 TIMES DAILY
Qty: 180 CAPSULE | Refills: 1 | Status: SHIPPED | OUTPATIENT
Start: 2022-06-29 | End: 2022-07-01 | Stop reason: SDUPTHER

## 2022-06-29 RX ORDER — OMEPRAZOLE 40 MG/1
40 CAPSULE, DELAYED RELEASE ORAL DAILY
Qty: 90 CAPSULE | Refills: 1 | Status: SHIPPED | OUTPATIENT
Start: 2022-06-29 | End: 2022-07-01 | Stop reason: SDUPTHER

## 2022-07-01 ENCOUNTER — TELEPHONE (OUTPATIENT)
Dept: FAMILY MEDICINE CLINIC | Facility: CLINIC | Age: 72
End: 2022-07-01

## 2022-07-01 DIAGNOSIS — U07.1 COVID-19: Primary | ICD-10-CM

## 2022-07-01 RX ORDER — VENLAFAXINE HYDROCHLORIDE 150 MG/1
150 CAPSULE, EXTENDED RELEASE ORAL 2 TIMES DAILY
Qty: 180 CAPSULE | Refills: 1 | Status: SHIPPED | OUTPATIENT
Start: 2022-07-01

## 2022-07-01 RX ORDER — OMEPRAZOLE 40 MG/1
40 CAPSULE, DELAYED RELEASE ORAL DAILY
Qty: 90 CAPSULE | Refills: 1 | Status: SHIPPED | OUTPATIENT
Start: 2022-07-01

## 2022-07-01 RX ORDER — GUAIFENESIN AND CODEINE PHOSPHATE 100; 10 MG/5ML; MG/5ML
5 SOLUTION ORAL 3 TIMES DAILY PRN
Qty: 200 ML | Refills: 0 | Status: SHIPPED | OUTPATIENT
Start: 2022-07-01 | End: 2022-10-20

## 2022-07-01 NOTE — TELEPHONE ENCOUNTER
Caller: KARO JEFF    Relationship to patient: Emergency Contact    Best call back number: [phone]  Date of positive COVID19 test: 06/30 WITH A HOME TEST     COVID19 symptoms: HEADACHE, BODY ACHES, COUGH, FEVER, CHILLS , VOMITING     Date of initial quarantine: 06/29        What is the patients preferred pharmacy:  [facility] [address] [place] [address] [address] AT [facility] [address] [facility] [address] - [phone] [place] [phone] FX

## 2022-07-01 NOTE — TELEPHONE ENCOUNTER
Alternate Tylenol and Motrin for body aches and fever.  I sent in guaifenesin with codeine to take as needed for cough and congestion.  They should monitor for chest pain or shortness of breath and if that occurs, take patient to ER

## 2022-07-01 NOTE — TELEPHONE ENCOUNTER
Spoke with caretaker and she said that the vomiting was more yesterday and as of last night it has stopped. Her symptoms started Wed morning and her test was positive for COVID last night. She is taking tylenol for the body aches and fever. I also recommended the vitamin C and vitamin D. She is wondering about something for her cough. She is not having any shortness of breath. Please advise.

## 2022-07-01 NOTE — TELEPHONE ENCOUNTER
Caller: AZRA    Relationship: CALEB  Best call back number: [phone]    Requested Prescriptions:   Requested Prescriptions     Pending Prescriptions Disp Refills   • omeprazole (priLOSEC) 40 MG capsule 90 capsule 1     Sig: Take 1 capsule by mouth Daily.   • venlafaxine XR (EFFEXOR-XR) 150 MG 24 hr capsule 180 capsule 1     Sig: Take 1 capsule by mouth 2 (Two) Times a Day.        Pharmacy where request should be sent: [facility] (NOW [facility]) - [place] [id number] - [phone]  - [phone] FX       Does the patient have less than a 3 day supply:  [x] Yes  [] No    Yasmin Cohn Rep   07/01/22 13:54 EDT

## 2022-08-15 ENCOUNTER — OFFICE VISIT (OUTPATIENT)
Dept: FAMILY MEDICINE CLINIC | Facility: CLINIC | Age: 72
End: 2022-08-15

## 2022-08-15 VITALS
HEART RATE: 63 BPM | SYSTOLIC BLOOD PRESSURE: 132 MMHG | OXYGEN SATURATION: 93 % | WEIGHT: 181 LBS | DIASTOLIC BLOOD PRESSURE: 68 MMHG | BODY MASS INDEX: 30.16 KG/M2 | HEIGHT: 65 IN

## 2022-08-15 DIAGNOSIS — R42 DIZZINESS: ICD-10-CM

## 2022-08-15 DIAGNOSIS — Z12.31 SCREENING MAMMOGRAM FOR BREAST CANCER: Primary | ICD-10-CM

## 2022-08-15 DIAGNOSIS — R29.6 FREQUENT FALLS: ICD-10-CM

## 2022-08-15 DIAGNOSIS — E03.9 ACQUIRED HYPOTHYROIDISM: ICD-10-CM

## 2022-08-15 PROCEDURE — 84439 ASSAY OF FREE THYROXINE: CPT | Performed by: NURSE PRACTITIONER

## 2022-08-15 PROCEDURE — 84481 FREE ASSAY (FT-3): CPT | Performed by: NURSE PRACTITIONER

## 2022-08-15 PROCEDURE — 99214 OFFICE O/P EST MOD 30 MIN: CPT | Performed by: NURSE PRACTITIONER

## 2022-08-15 PROCEDURE — 84443 ASSAY THYROID STIM HORMONE: CPT | Performed by: NURSE PRACTITIONER

## 2022-08-15 PROCEDURE — 36415 COLL VENOUS BLD VENIPUNCTURE: CPT | Performed by: NURSE PRACTITIONER

## 2022-08-15 RX ORDER — CALCIUM CARBONATE 160(400)MG
1 TABLET,CHEWABLE ORAL ONCE
Qty: 1 EACH | Refills: 0 | Status: SHIPPED | OUTPATIENT
Start: 2022-08-15 | End: 2022-08-15

## 2022-08-15 RX ORDER — DONEPEZIL HYDROCHLORIDE 5 MG/1
5 TABLET, FILM COATED ORAL NIGHTLY
Qty: 30 TABLET | Refills: 1 | Status: SHIPPED | OUTPATIENT
Start: 2022-08-15 | End: 2022-10-20 | Stop reason: SDUPTHER

## 2022-08-15 NOTE — PROGRESS NOTES
"Chief Complaint  Dizziness (Having a lot of balance issues. Had 3 falls on Friday and is falling at least once a week. ) and Med Refill (Needing a refill on aircept )    Subjective        Radha Barbour presents to [facility]  History of Present Illness    Patient presents for f/u visit.     Patient reports difficulty with balance, having increased falls. Patient reports she always feels off balance. She denies dizziness as cause of falls.  Patient ambulating with cane.    Patient has hypothyroidism, dose increased to 150mcg due to elevated TSH of 34.8.  Patient is also started taking medication prior to breakfast and with other medications.    Patient has seen Urology since last visit, camacho catheter removed. Bladder stimulator intact and emptying bladder but patient having urinary incontinence.  She was advised to do exercises to help strengthen bladder muscles.    Objective   Vital Signs:  /68 (BP Location: Left arm, Patient Position: Sitting, Cuff Size: Adult)   Pulse 63   Ht 165.1 cm (65\")   Wt 82.1 kg (181 lb)   SpO2 93%   BMI 30.12 kg/m²   Estimated body mass index is 30.12 kg/m² as calculated from the following:    Height as of this encounter: 165.1 cm (65\").    Weight as of this encounter: 82.1 kg (181 lb).          Physical Exam  Constitutional:       Appearance: Normal appearance.   HENT:      Head: Normocephalic.   Cardiovascular:      Rate and Rhythm: Normal rate and regular rhythm.   Pulmonary:      Effort: Pulmonary effort is normal.      Breath sounds: Normal breath sounds.   Abdominal:      General: Abdomen is flat. Bowel sounds are normal.      Palpations: Abdomen is soft.   Musculoskeletal:         General: Normal range of motion.      Cervical back: Neck supple.      Right lower leg: No edema.      Left lower leg: No edema.   Skin:     General: Skin is warm and dry.   Neurological:      Mental Status: She is alert and oriented to person, place, and " time.      Gait: Gait is intact.   Psychiatric:         Attention and Perception: Attention normal.         Mood and Affect: Mood normal.         Speech: Speech normal.        Result Review :    CMP    CMP 3/31/22 4/13/22 6/2/22   Glucose 93 84 103 (A)   BUN 15 16 16   Creatinine 0.90 0.94 1.00   Sodium 142 141 138   Potassium 4.4 4.2 4.3   Chloride 103 103 100   Calcium 9.4 9.1 9.0   Albumin   4.60   Total Bilirubin   0.4   Alkaline Phosphatase   89   AST (SGOT)   16   ALT (SGPT)   17   (A) Abnormal value            CBC    CBC 3/31/22 4/13/22 6/2/22   WBC 6.77 7.20 8.61   RBC 3.80 3.91 4.15   Hemoglobin 11.2 (A) 11.6 (A) 12.0   Hematocrit 34.1 34.6 36.1   MCV 89.7 88.5 87.0   MCH 29.5 29.7 28.9   MCHC 32.8 33.5 33.2   RDW 12.3 13.3 12.8   Platelets 318 317 271   (A) Abnormal value            Lipid Panel    Lipid Panel 6/2/22   Total Cholesterol 187   Triglycerides 109   HDL Cholesterol 106 (A)   VLDL Cholesterol 18   LDL Cholesterol  63   LDL/HDL Ratio 0.56   (A) Abnormal value            TSH    TSH 2/21/22 3/31/22 6/2/22   TSH 4.920 (A) 0.375 34.800 (A)   (A) Abnormal value            Most Recent A1C    HGBA1C Most Recent 6/2/22   Hemoglobin A1C 6.9 (A)   (A) Abnormal value                      Assessment and Plan   Diagnoses and all orders for this visit:    1. Screening mammogram for breast cancer (Primary)  -     Mammo Screening Digital Tomosynthesis Bilateral With CAD; Future    2. Frequent falls  Assessment & Plan:  1.  Refer to PT  2.  Order Rollator    Orders:  -     Ambulatory Referral to Physical Therapy Evaluate and treat    3. Dizziness  Assessment & Plan:  1.  Refer to PT for eval  2.  Patient will see neurology in October    Orders:  -     Ambulatory Referral to Physical Therapy Evaluate and treat    4. Acquired hypothyroidism  Assessment & Plan:  1.  Continue Synthroid 150 mcg daily  2.  Repeat labs today    Orders:  -     TSH  -     T4, Free  -     T3, Free    Other orders  -     Discontinue: Misc.  Devices (Rollator Ultra-Light) misc; 1 application 1 (One) Time for 1 dose.  Dispense: 1 each; Refill: 0  -     donepezil (Aricept) 5 MG tablet; Take 1 tablet by mouth Every Night.  Dispense: 30 tablet; Refill: 1  -     Misc. Devices (Rollator Ultra-Light) misc; 1 application 1 (One) Time for 1 dose.  Dispense: 1 each; Refill: 0         I spent 30 minutes caring for Radha on this date of service. This time includes time spent by me in the following activities:preparing for the visit, reviewing tests, obtaining and/or reviewing a separately obtained history, performing a medically appropriate examination and/or evaluation , counseling and educating the patient/family/caregiver, ordering medications, tests, or procedures, referring and communicating with other health care professionals , documenting information in the medical record, independently interpreting results and communicating that information with the patient/family/caregiver and care coordination  Follow Up   Return in about 3 months (around 11/15/2022) for DMII.  Patient was given instructions and counseling regarding her condition or for health maintenance advice. Please see specific information pulled into the AVS if appropriate.       Answers for HPI/ROS submitted by the patient on 8/15/2022  Please describe your symptoms.: Falling frequently, unsteady on feet. Donepezil prescription ran out.  Have you had these symptoms before?: Yes  How long have you been having these symptoms?: Greater than 2 weeks  Please list any medications you are currently taking for this condition.: Antivert as needed  What is the primary reason for your visit?: Other

## 2022-08-16 LAB
T3FREE SERPL-MCNC: 3.15 PG/ML (ref 2–4.4)
T4 FREE SERPL-MCNC: 1.9 NG/DL (ref 0.93–1.7)
TSH SERPL DL<=0.05 MIU/L-ACNC: 0.19 UIU/ML (ref 0.27–4.2)

## 2022-08-16 NOTE — PROGRESS NOTES
TSH has dropped from 34.8-0.193.  This means the dosage changes made her thyroid not overactive.  I would like them to return to the 137 mcg and continue taking first thing in the morning before any food or other medication.  I think between the dosage change and the change in how patient takes medication, drastic fluctuation in his lab results occurred.  Let me know if you need a refill on the 137 mcg

## 2022-08-19 ENCOUNTER — TREATMENT (OUTPATIENT)
Dept: PHYSICAL THERAPY | Facility: CLINIC | Age: 72
End: 2022-08-19

## 2022-08-19 DIAGNOSIS — R42 VERTIGO: ICD-10-CM

## 2022-08-19 DIAGNOSIS — R42 DIZZINESS: Primary | ICD-10-CM

## 2022-08-19 PROCEDURE — 95992 CANALITH REPOSITIONING PROC: CPT | Performed by: PHYSICAL THERAPIST

## 2022-08-19 PROCEDURE — 97110 THERAPEUTIC EXERCISES: CPT | Performed by: PHYSICAL THERAPIST

## 2022-08-19 PROCEDURE — 97161 PT EVAL LOW COMPLEX 20 MIN: CPT | Performed by: PHYSICAL THERAPIST

## 2022-08-19 NOTE — PROGRESS NOTES
Physical Therapy Initial Evaluation and Plan of Care    Patient: Radha Barbour   : 1950  Diagnosis/ICD-10 Code:  Dizziness [R42]  Referring practitioner: GINA Minaya  Date of Initial Visit: 2022  Today's Date: 2022  Patient seen for 1 sessions           Subjective Questionnaire: DHI: 48% impairment       Subjective Evaluation    History of Present Illness  Mechanism of injury: Pt reporting a long history of dizziness (years) and has been diagnosed with vertigo. Has had it treated before and did ok with the treatment, and felt better for about a week. She was treated at her doctors office in [place] years ago. Does have these episodes about once a week. Has fallen multiple times d/t being dizzy, and falls about once a week.     Limitations: looking up, turning head, sometimes dizzy with looking up and down aisles in grocery store, transitioning in and out of bed, some HA, falls frequently     PMHx: colon resection for colon cancer , (B) knee replacements  Denies: heart / lung issues, current cancer, seizures, pacemaker     Quality of life: good    Pain  No pain reported    Treatments  No previous or current treatments  Patient Goals  Patient goals for therapy: improved balance, increased motion, increased strength and independence with ADLs/IADLs             Objective          Ambulation     Comments   VESTIBULAR:  Vertigo / hypofunction    VOMS:      Baseline symptoms- 1/10           Smooth pursuit - 2/10           Saccades vertical - 2/10           Saccades horizontal 4/10           Convergence (near point) 4/10           VOR - horizontal 4/10           VOR - vertical 2/10           Visual Motion Sensitivity Test : 3/10     Shahida SOP: not tested at eval      Condition 1 -     Condition 2     Condition 3     Condition 4     Condition 5     Condition 6     Condition 7     Vertebral AA test: negative (B)     SL Hallpike test: positive (R)             Assessment & Plan      Assessment  Impairments: abnormal coordination, abnormal gait, activity intolerance, impaired balance, impaired physical strength, lacks appropriate home exercise program and safety issue  Functional Limitations: sleeping, moving in bed, stooping and unable to perform repetitive tasks  Assessment details: Pt is a 71 yr/o female presenting with dizziness and vestibular impairment.   Per DHI she reports 48% impairment. She shows right ear hypofunction and vertigo which has been causing repeated falls each week. She also shows general deconditioning and impaired balance with her gait pattern. She showed decreased dizziness after canalith repositioning maneuvers today.   Educated on results of evaluation, as well as initial HEP. Pt with good understanding. Recommend skilled OPPT to address above issues, pt in agreement.   Prognosis: good    Goals  Plan Goals: STG: to be met within 6 visits   1. Pt to be (I) with initial HEP  2. Pt to report 50% decrease in dizziness with head turns and sit<>supine transitions   3. Pt to report 25% improvement in confidence while walking around familiar areas     LTG: to be met by DC   1. Pt to be (I) with finalized HEP  2. Pt to report decreased impairment per DHI to less than 25% impairment   3. Pt to report 0 falls in a 2 week span   4. Pt to report no dizziness with transitions sit<>supine and with turning head     Plan  Therapy options: will be seen for skilled therapy services  Planned modality interventions: cryotherapy, thermotherapy (hydrocollator packs) and dry needling  Other planned modality interventions: Reiki Therapy  Planned therapy interventions: manual therapy, neuromuscular re-education, spinal/joint mobilization, strengthening, therapeutic activities, home exercise program, gait training, functional ROM exercises, fine motor coordination training and balance/weight-bearing training  Other planned therapy interventions: canalith repositioning techniques and  maneuvers  Frequency: 1x week  Duration in weeks: 13  Treatment plan discussed with: patient        History # of Personal Factors and/or Comorbidities: LOW (0)  Examination of Body System(s): # of elements: LOW (1-2)  Clinical Presentation: STABLE   Clinical Decision Making: LOW      Timed:         Manual Therapy:         mins  84914;     Therapeutic Exercise:    12     mins  60616;     Neuromuscular Aquiles:        mins  04028;    Therapeutic Activity:          mins  97927;     Gait Training:           mins  44569;     Ultrasound:          mins  12799;    Ionto                                   mins   02329  Self Care                            mins   29342  Canalith Repos    18     mins 54702      Un-Timed:  Electrical Stimulation:         mins  35461 ( );  Traction          mins 14168  Dry Needle                 ______ mins DRYNDL  Low Eval     23     Mins  81936  Mod Eval          Mins  19782  High Eval                            Mins  49252  Re-Eval                               mins  94321        Timed Treatment:   30   mins   Total Treatment:     53   mins    PT SIGNATURE: Renee Aparicio, PT   DATE TREATMENT INITIATED: 8/19/2022    Initial Certification  Certification Period: 8/19/2022 through 11/17/2022  I certify that the therapy services are furnished while this patient is under my care.  The services outlined above are required by this patient, and will be reviewed every 90 days.     PHYSICIAN: Michell Ramirez, GINA      DATE:     Please sign and return via fax to [phone]. Thank you, [facility].

## 2022-08-23 RX ORDER — LEVOTHYROXINE SODIUM 0.15 MG/1
TABLET ORAL
Qty: 30 TABLET | Refills: 1 | Status: SHIPPED | OUTPATIENT
Start: 2022-08-23 | End: 2022-11-16

## 2022-08-30 ENCOUNTER — TELEPHONE (OUTPATIENT)
Dept: FAMILY MEDICINE CLINIC | Facility: CLINIC | Age: 72
End: 2022-08-30

## 2022-08-31 RX ORDER — GABAPENTIN 300 MG/1
300 CAPSULE ORAL 3 TIMES DAILY
Qty: 270 CAPSULE | Refills: 0 | Status: SHIPPED | OUTPATIENT
Start: 2022-08-31 | End: 2022-12-08 | Stop reason: SDUPTHER

## 2022-08-31 RX ORDER — RAMIPRIL 2.5 MG/1
5 CAPSULE ORAL DAILY
Qty: 90 CAPSULE | Refills: 1 | Status: SHIPPED | OUTPATIENT
Start: 2022-08-31 | End: 2022-09-02 | Stop reason: SDUPTHER

## 2022-08-31 RX ORDER — PRAVASTATIN SODIUM 20 MG
20 TABLET ORAL DAILY
Qty: 90 TABLET | Refills: 1 | Status: SHIPPED | OUTPATIENT
Start: 2022-08-31 | End: 2023-03-03

## 2022-08-31 NOTE — TELEPHONE ENCOUNTER
Caller: Radha Barbour    Relationship: Self    Best call back number:[phone]       Requested Prescriptions:   Requested Prescriptions     Pending Prescriptions Disp Refills   • pravastatin (Pravachol) 20 MG tablet 90 tablet 1     Sig: Take 1 tablet by mouth Daily.   • gabapentin (NEURONTIN) 300 MG capsule 270 capsule 0     Sig: Take 1 capsule by mouth 3 (Three) Times a Day.   • ramipril (ALTACE) 2.5 MG capsule 90 capsule 1     Sig: Take 2 capsules by mouth Daily.        Pharmacy where request should be sent: [facility] (NOW [facility]) - [place] [id number] - [phone]  - [phone] FX     Additional details provided by patient: PATIENT HAS LESS THAN A 3 DAY SUPPLY ON THE RAMIPRIL     Does the patient have less than a 3 day supply:  [x] Yes  [] No    Yasmin Hutchinson Rep   08/31/22 15:45 EDT

## 2022-09-02 RX ORDER — RAMIPRIL 2.5 MG/1
5 CAPSULE ORAL DAILY
Qty: 90 CAPSULE | Refills: 1 | Status: SHIPPED | OUTPATIENT
Start: 2022-09-02 | End: 2022-12-08 | Stop reason: SDUPTHER

## 2022-09-13 RX ORDER — MIRABEGRON 25 MG/1
TABLET, FILM COATED, EXTENDED RELEASE ORAL
COMMUNITY
Start: 2022-07-23 | End: 2022-10-20

## 2022-09-13 RX ORDER — AMLODIPINE BESYLATE 10 MG/1
TABLET ORAL
COMMUNITY
Start: 2022-07-23 | End: 2022-09-21

## 2022-09-13 RX ORDER — LEVOTHYROXINE SODIUM 137 UG/1
TABLET ORAL
COMMUNITY
Start: 2022-08-26 | End: 2022-09-21

## 2022-09-13 RX ORDER — METFORMIN HYDROCHLORIDE 1000 MG/1
TABLET, FILM COATED, EXTENDED RELEASE ORAL
COMMUNITY
Start: 2022-08-26 | End: 2022-09-21 | Stop reason: SDUPTHER

## 2022-09-13 NOTE — TELEPHONE ENCOUNTER
[facility]  Hospitalist Progress Note  Sekou Harvey MD  09/13/2022    Assessment & Plan         Julisa Chaney is a 76 year old female admitted on 7/27/2022.  PMH of trigeminal neuralgia who was recently admitted to [facility] on 7/19 for severe sepsis due to COVID-19 infection and suspected bacterial pneumonia. She was treated with 5 days of remdesivir and did not require any steroids, also treated with IV vancomycin and IV meropenem.     Her course in the hospital was prolonged and complicated by development of acute metabolic encephalopathy and CT scan of the head on 7/25 showed possible left frontal mass causing vasogenic edema and MRI obtained 7/27 showed possible left intracerebral abscess with ventriculitis versus neoplasm.  She was switched to IV vancomycin, cefepime and metronidazole and transferred to [facility] for neurosurgery assessment. MRI brain w/wo contrast 7/30 showed ventriculitis with probable abscess. Neurosurgery/neurology/infectious disease/oncology consulted. Patient taken to the OR on 7/31 for left frontal ventriculostomy.   Since then has remained on broad spectrum antibiotics.  In addition she has developed C. Difficile colitis and is on treatment.  She has continued to have a significant encephalopathy which is very slowly improving.     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  * flow cytometry did not show any evidence of malignancy.  * s/p Left frontal ventriculostomy on 7/31/2022, EVD removed 8/8; CSF cultures have remained negative; CSF counts decreasing 469---233 ---56 (last CSF from 8/18)  * Head CT from 8/19 with stable presumed vasogenic edema in the white matter of the anterior inferior left frontal lobe related to ventriculitis of the left lateral ventricle;  HUB to share    MRI showed mild small vessel changes, otherwise normal    LVM informing pt     stable ventriculomegaly of the left lateral ventricle   * Has been having persistent headaches; reevaluated by neurosurgery 8/27 and recommended head CT, done on 8/29-->Left lateral ventricle has hydrocephalus decreased in size. The left temporal horn remains slightly dilated but no samuel hydrocephalus. No acute changes.  - Remains afebrile; leukocytosis resolved ; was initially on vancomycin, cefepime, and metronidazole, then Meropenem and Vancomycin; ID signed off, completed 6 weeks treatment until 9/8/22.  - Continue scheduled Tylenol 650 mg TID. PRN ibuprofen ordered.  - Trying to avoid any narcotics with her confusion/ encephalopathy.  - PT/OT consulted, however patient does not put much effort into therapies currently and PT/OT signing off for now.  - Progress has been slow and fairly minimal. Concern about overall prognosis. Briefly discussed palliative care with patient's daughter on 9/6/22. She requested re-evaluation from psychiatry regarding depression (completed 9/8/22, see consult note). Seems to want to see how she does with the medication prior to having any discussion about palliative care.       Acute infectious encephalopathy due to above:  * mentation much improved since admission ; fluctuating at times ; will not engage in conversation most of the times.  * 8/23 discontinued prn benadryl, narcotics; minimize narcotic.  - Continue to treat underlying issues as noted above.   - Maintain normal sleep/wake cycle as able.  - Use minimal single dosing of narcotic if absolutely needed for pain, no standing order.   - had some head, will add ultram 50 mg q 6 hrs prn with improved headaches  - she is more back to baseline, will attempt speech for swallow trials     Suicidal throughs/agitation  Depression  * With improvement in her mentation, she has started becoming at times restless and agitated. On 8/27, reported having suicidal thoughts. No plans/attempts.  * initiated suicide precautions and sitter,  evaluated by psychiatry service, discontinued.   - Reassess periodically for ongoing need.  - Anti depressant not recommended currently in the setting of delirium/encephalopathy.  - HS seroquel increased 8/31.  - Psych reconsulted on 9/8/22, restarted remeron (see consult note).     C. difficile colitis  Patient was noted to have copious diarrhea on 8/1 and was noted positive for C. Difficile.  * Rectal tube came off 8/28 still with some diarrhea but not severe  - Continue oral vancomycin - till 9/22.  - continue questran from 4g daily to BID, loose stools are improving  - continue probiotic  - barrier cream for skin breakdown     Acute on chronic anemia  Prior labs show baseline hemoglobin of about 9-10.   * Hb on admission was 11.7, slowly trended down, got 1 unit PRBC on 8/8/22 for Hb 6.9  * Received IV iron infusion x3 with first on 8/11/22.  - Iron panel on 7/30/22 showed that her total iron was low at 14, binding capacity was low at 28, iron saturation was 6 and B12 was high and folate was normal  * No obvious ongoing bleeding noted; Hb has now remained stable around 9 to 10 on 8/26  - Continue to monitor hgb periodically.     History of trigeminal neuralgia  - Continue PTA Trileptal , Keppra and Topamax.     History of chronic pyloric ulcer  She had EGD done in 2020 which showed gastroduodenitis with a duodenal stricture.  - Continue PPI.     Hyperglycemia, resolved  Recent HbA1c was 5.6 on 7/19/22. Was not on any medication prior to admission.  - Continue medium dose sliding scale insulin.  - Requiring no supplemental insulin and no hypoglycemia, glucose checks q8h during the day and at 0200.  - Decreased lantus to 8 units nightly 9/2.  - Blood sugars well controlled.      Intermittent Hypokalemia, Hypo/hypernatremia, Hypophosphatemia  - Replacement per protocol.     Hypernatremia: resolved.  - Continue free water flushes 200 ml every 4 hrs.     Severe dysphagia  Severe protein calorie malnutrition  -  Nutrition following for tube feeds via G tube (placed 8/16) ; getting free water flushes.  - Limited re-evaluation 9/5 by SLP; recommend continued NPO status. Pending re-evaluation.     Physical deconditioning  - Will need TCU placement; declined by LTACH.  - PT/OT signed off due to lack of patient participation.  - SW following for disposition.     Diet: NPO for Medical/Clinical Reasons Except for: Other; Specify: NPO For oral intake and gastric feedings for 6 hours post insertion Gastrostomy G/GJ tube. May feed through Jejunal or Distal PORT immediately for GJ tubes ONLY.  Adult Formula Drip Feeding: Continuous Vital 1.5; Gastrostomy; Goal Rate: 50; mL/hr; Medication - Feeding Tube Flush Frequency: At least 15-30 mL water before and after medication administration and with tube clogging; Amount to Send (Nutrition us...    -- awaiting speech for swallow evaluation and initiation of diet.    DVT Prophylaxis: Pneumatic Compression Devices  Abrams Catheter: Not present  Central Lines: PRESENT  PICC Triple Lumen 07/22/22 Right Basilic Vascular access-Site Assessment: WDL  Cardiac Monitoring: None  Code Status: Full Code       Disposition Plan  - Will need TCU placement; declined by LTACH.  - PT/OT signed off due to lack of patient participation.  - SW following for disposition.      Interval History   -- firmere stools  -- no acute overnight issues  -- has yet to see speech for swallow evaluation    -Data reviewed today: I reviewed all new labs and imaging over the last 24 hours. I personally reviewed no images or EKG's today.    Physical Exam    , Blood pressure 114/67, pulse 88, temperature 98.2  F (36.8  C), temperature source Axillary, resp. rate 18, weight 50.1 kg (110 lb 7.2 oz), SpO2 97 %, not currently breastfeeding.  Vitals:    09/04/22 0618 09/05/22 0552 09/07/22 0500   Weight: 50.6 kg (111 lb 8.8 oz) 50.3 kg (110 lb 14.3 oz) 50.1 kg (110 lb 7.2 oz)     Vital Signs with Ranges  Temp:  [97.8  F (36.6  C)-98.4   F (36.9  C)] 98.2  F (36.8  C)  Pulse:  [] 88  Resp:  [18-20] 18  BP: (114-128)/(54-67) 114/67  SpO2:  [94 %-97 %] 97 %  I/O's Last 24 hours  No intake/output data recorded.    Constitutional:  no apparent distress  Respiratory: Clear to auscultation bilaterally, no crackles or wheezing  Cardiovascular: Regular rate and rhythm, normal S1 and S2,   GI: Normal bowel sounds, soft, non-distended, non-tender  Skin/Integumen: No rashes, no cyanosis, no edema  Other:      Medications   All medications were reviewed.      banatrol plus  1 packet Per Feeding Tube TID w/meals     cholestyramine  1 packet Oral BID     insulin aspart  1-6 Units Subcutaneous Q8H     insulin glargine  8 Units Subcutaneous At Bedtime     levETIRAcetam  1,000 mg Oral or Feeding Tube Q12H     miconazole with skin protectant   Topical BID     mirtazapine  7.5 mg Oral or Feeding Tube At Bedtime     multivitamins w/minerals  15 mL Per Feeding Tube Daily     OXcarbazepine  450 mg Oral or Feeding Tube At Bedtime     pantoprazole  40 mg Oral or Feeding Tube QAM AC     QUEtiapine  50 mg Oral At Bedtime     saccharomyces boulardii  250 mg Per Feeding Tube BID     sodium chloride (PF)  10-40 mL Intracatheter Q8H     sodium chloride (PF)  10-40 mL Intracatheter Q7 Days     topiramate  50 mg Oral or Feeding Tube At Bedtime     vancomycin  125 mg Oral or Feeding Tube 4x Daily        Data   Recent Labs   Lab 09/13/22  1208 09/13/22  0759 09/13/22  0559 09/12/22  0842 09/12/22  0540 09/11/22  1326 09/11/22  0538 09/10/22  1230 09/10/22  1040   WBC  --   --   --   --  9.8  --   --   --   --    HGB  --   --   --   --  10.0*  --   --   --   --    MCV  --   --   --   --  82  --   --   --   --    PLT  --   --   --   --  437  --   --   --   --    NA  --   --   --   --  136  --   --   --   --    POTASSIUM  --   --   --   --  4.3  --  4.3  --  3.8   CHLORIDE  --   --   --   --  102  --   --   --   --    CO2  --   --   --   --  27  --   --   --   --    BUN  --   --    --   --  19  --   --   --   --    CR  --   --   --   --  0.51*  --   --   --   --    ANIONGAP  --   --   --   --  7  --   --   --   --    ADY  --   --   --   --  9.1  --   --   --   --    * 117* 137*   < > 126*   < >  --    < >  --     < > = values in this interval not displayed.       No results found for this or any previous visit (from the past 24 hour(s)).    Sekou Harvey MD  Text Page  (7am to 6pm)

## 2022-09-21 ENCOUNTER — OFFICE VISIT (OUTPATIENT)
Dept: CARDIOLOGY | Facility: CLINIC | Age: 72
End: 2022-09-21

## 2022-09-21 VITALS
DIASTOLIC BLOOD PRESSURE: 77 MMHG | HEIGHT: 65 IN | HEART RATE: 68 BPM | OXYGEN SATURATION: 97 % | RESPIRATION RATE: 18 BRPM | BODY MASS INDEX: 29.99 KG/M2 | SYSTOLIC BLOOD PRESSURE: 148 MMHG | WEIGHT: 180 LBS

## 2022-09-21 DIAGNOSIS — I25.10 CORONARY ARTERY DISEASE INVOLVING NATIVE CORONARY ARTERY OF NATIVE HEART WITHOUT ANGINA PECTORIS: ICD-10-CM

## 2022-09-21 DIAGNOSIS — R94.30 ABNORMAL RESULTS OF CARDIOVASCULAR FUNCTION STUDIES: ICD-10-CM

## 2022-09-21 DIAGNOSIS — I10 HTN, GOAL BELOW 130/80: Primary | ICD-10-CM

## 2022-09-21 DIAGNOSIS — I10 PRIMARY HYPERTENSION: ICD-10-CM

## 2022-09-21 DIAGNOSIS — E11.69 TYPE 2 DIABETES MELLITUS WITH OTHER SPECIFIED COMPLICATION, WITHOUT LONG-TERM CURRENT USE OF INSULIN: ICD-10-CM

## 2022-09-21 PROCEDURE — 93000 ELECTROCARDIOGRAM COMPLETE: CPT | Performed by: INTERNAL MEDICINE

## 2022-09-21 PROCEDURE — 99214 OFFICE O/P EST MOD 30 MIN: CPT | Performed by: INTERNAL MEDICINE

## 2022-09-21 NOTE — PROGRESS NOTES
Cardiology Office Visit      Encounter Date:  09/21/2022    Patient ID:   Radha Barbour is a 71 y.o. female.    Chief Complaint   Patient presents with   • Hypertension   • Hyperlipidemia   • Dizziness            Dear Michell Ramirez APRN           History of Present Illness   It was my pleasure to see Ms. Barbour in the office today.  She is a 71-year-old female with significant for history of hypertension, hyperlipidemia, hypothyroidism, history of diabetes.  She was seen in evaluation for symptoms of dizziness, fatigue, weakness, frequent multiple falls.  Conservative medical management was initiated with decreasing dose of Norvasc, initiation of MARGAUX hose and abdominal binder.  Full ischemic work-up was ordered including 2D echocardiogram that showed normal LV systolic function with EF 55%, mild pulmonary hypertension, moderate aortic valve stenosis.  Nuclear stress testing with moderate sized severe intensity reversible ischemia involving the anterior and anterior apical wall.  She was subsequently scheduled for heart catheterization that showed moderate obstructive disease involving mid LAD, mid RCA-plan to medically manage.  Mild obstructive disease in the distal circumflex.  She presents in follow-up from that procedure.     Today, Ms. Barbour reports that she does feel better.  She takes as needed meclizine for her dizziness which does help.  She denies any chest discomfort, shortness of breath, lower extremity edema.  Recent labs reviewed that showed elevated TSH at 34.8.  Lipid panel improved from prior with , LDL 63.  CBC and CMP unremarkable.     Heart cath 4/15/2022: Moderate obstructive coronary artery disease involving the mid LAD and mid right coronary artery plan to manage conservatively with medical therapy and continued aggressive risk factor modification  Mild obstructive coronary artery disease involving the distal circumflex artery  Normal LV systolic function estimate LV  "ejection fraction of 50%  Normal left-sided filling pressures     No new cardiac symptoms  Have some intermittent vertigo     ASSESSMENT:  Dizziness  Orthostasis  Primary hypertension  Frequent falls  Diabetes mellitus 2  Hypothyroidism-uncontrolled  Coronary artery disease  Vertigo  Moderate obstructive coronary artery disease  Normal LV systolic function  Extensive cardiac work-up and evaluation  Moderate right carotid stenosis     PLAN:  Continue current medical therapy  Work-up and evaluation reviewed and discussed with patient and family  Lipids are optimal  Continue current medical therapy with ramipril 2.5 mg p.o. once a day Pravachol 20 mg p.o. once a day aspirin 81 mg p.o. once a day Norvasc 5 mg p.o. once a day  Avoid dehydration  Consider repositioning therapy with physical therapy for vertigo  Discussed with patient and family  Close monitoring of blood pressure at home  Follow-up  in office in 1 year    Objective:    Vitals:  Vitals:    09/21/22 1358   BP: 148/77   BP Location: Left arm   Patient Position: Sitting   Cuff Size: Large Adult   Pulse: 68   Resp: 18   SpO2: 97%   Weight: 81.6 kg (180 lb)   Height: 165.1 cm (65\")       Physical Exam:    General: Alert, cooperative, no distress, appears stated age  Head:  Normocephalic, atraumatic, mucous membranes moist  Eyes:  Conjunctiva/corneas clear, EOM's intact     Neck:  Supple,  no adenopathy;      Lungs: Clear to auscultation bilaterally, no wheezes rhonchi rales are noted  Chest wall: No tenderness  Heart::  Regular rate and rhythm, S1 and S2 normal, no murmur, rub or gallop  Abdomen: Soft, non-tender, nondistended bowel sounds active  Extremities: No cyanosis, clubbing, or edema  Pulses: 2+ and symmetric all extremities  Skin:  No rashes or lesions  Neuro/psych: A&O x3. CN II through XII are grossly intact with appropriate affect      Allergies:  No Known Allergies    Medication Review:     Current Outpatient Medications:   •  amLODIPine (NORVASC) " 5 MG tablet, Take 1 tablet by mouth Daily., Disp: 90 tablet, Rfl: 3  •  aspirin 81 MG EC tablet, Take 81 mg by mouth Daily., Disp: , Rfl:   •  buPROPion (WELLBUTRIN) 100 MG tablet, Take 1 tablet by mouth 2 (Two) Times a Day., Disp: 180 tablet, Rfl: 0  •  CBD (cannabidiol) oral oil, Take 1 drop by mouth Daily As Needed., Disp: , Rfl:   •  cloNIDine (CATAPRES) 0.2 MG tablet, Take 1 tablet by mouth Daily., Disp: 90 tablet, Rfl: 1  •  donepezil (Aricept) 5 MG tablet, Take 1 tablet by mouth Every Night., Disp: 30 tablet, Rfl: 1  •  gabapentin (NEURONTIN) 300 MG capsule, Take 1 capsule by mouth 3 (Three) Times a Day., Disp: 270 capsule, Rfl: 0  •  guaiFENesin-codeine (GUAIFENESIN AC) 100-10 MG/5ML liquid, Take 5 mL by mouth 3 (Three) Times a Day As Needed for Cough or Congestion., Disp: 200 mL, Rfl: 0  •  hydrOXYzine (ATARAX) 10 MG tablet, Take 1 tablet by mouth Daily., Disp: 90 tablet, Rfl: 1  •  levothyroxine (SYNTHROID, LEVOTHROID) 150 MCG tablet, TAKE ONE TABLET BY MOUTH DAILY, Disp: 30 tablet, Rfl: 1  •  meclizine (ANTIVERT) 25 MG tablet, TAKE 1 TABLET THREE TIMES DAILY AS NEEDED FOR DIZZINESS, Disp: 180 tablet, Rfl: 0  •  memantine (Namenda) 10 MG tablet, Take 1 tablet by mouth 2 (Two) Times a Day., Disp: 180 tablet, Rfl: 1  •  metFORMIN (Glucophage) 1000 MG tablet, Take 1 tablet by mouth Daily With Breakfast., Disp: 90 tablet, Rfl: 1  •  Myrbetriq 25 MG tablet sustained-release 24 hour 24 hr tablet, , Disp: , Rfl:   •  omeprazole (priLOSEC) 40 MG capsule, Take 1 capsule by mouth Daily., Disp: 90 capsule, Rfl: 1  •  pravastatin (Pravachol) 20 MG tablet, Take 1 tablet by mouth Daily., Disp: 90 tablet, Rfl: 1  •  ramipril (ALTACE) 2.5 MG capsule, Take 2 capsules by mouth Daily., Disp: 90 capsule, Rfl: 1  •  rOPINIRole (REQUIP) 0.5 MG tablet, Take 1 tablet by mouth every night at bedtime., Disp: 90 tablet, Rfl: 1  •  venlafaxine XR (EFFEXOR-XR) 150 MG 24 hr capsule, Take 1 capsule by mouth 2 (Two) Times a Day., Disp:  180 capsule, Rfl: 1  •  vitamin B-12 (CYANOCOBALAMIN) 1000 MCG tablet, Take 1,000 mcg by mouth Daily., Disp: , Rfl:   •  Vitamin D, Cholecalciferol, 50 MCG (2000 UT) capsule, Take 2,000 Units by mouth Daily., Disp: , Rfl:     Family History:  Family History   Problem Relation Age of Onset   • Arthritis Mother    • Colon cancer Mother    • Thyroid disease Mother    • Stroke Mother    • Cancer Mother         Colon   • Heart disease Sister    • Heart disease Sister        Past Medical History:  Past Medical History:   Diagnosis Date   • Anxiety    • Arthritis 1998   • B12 deficiency 07/08/2016    Last Assessment & Plan:  Formatting of this note might be different from the original.  Compliant and controlled with current medication B 12 supplements . Lab today B 12   • Back pain, chronic 07/08/2016    Last Assessment & Plan:  Formatting of this note might be different from the original. Will refer to PT   • Colon polyp 2005    Colon Cancer   • Depression 07/08/2016    Last Assessment & Plan:  Formatting of this note might be different from the original.  Compliant and controlled with current medication   • Diabetic peripheral neuropathy (HCC) 02/14/2020    Last Assessment & Plan:  Formatting of this note might be different from the original. Stable   • DM type 2, goal HbA1c < 7% (HCC) 03/27/2017    Last Assessment & Plan:  Formatting of this note might be different from the original.  Compliant and controlled with current diet.llabs today A1c   • Dyslipidemia 10/11/2017    Last Assessment & Plan:  Formatting of this note might be different from the original.  Compliant and controlled with current medication/statin therapy/labs today   • Environmental and seasonal allergies 10/11/2019   • Gastric reflux 03/27/2017   • HTN, goal below 130/80 02/10/2016    Last Assessment & Plan:  Formatting of this note might be different from the original. Compliant and controlled with current medication ramipril /labs today cmp to check  renal function and electrolytes   • Hyperlipidemia    • Hypothyroidism 2014    Last Assessment & Plan:  Formatting of this note might be different from the original. TSH low reduce levothyroxine.  Will check TSH today.   • Insomnia 2015   • Lumbosacral radiculopathy 2020    Last Assessment & Plan:  Formatting of this note might be different from the original.  Compliant and controlled with current medication Muscle relaxer   • Osteoporosis 2020    Formatting of this note is different from the original. RADIOLOGY REPORT   FACILITY:  [facility] UNIT/AGE/GENDER: [id number]  OP      AGE:69 Y          SEX:F PATIENT NAME/:  GM DERAS    1950 UNIT NUMBER:  [id number] ACCOUNT NUMBER:  [id number] ACCESSION NUMBER:  [id number]     EXAMINATION: Bone densitometry of multiple sites, lumbar spine, left hip and distal   • Restless leg syndrome 2018    Last Assessment & Plan:  Formatting of this note might be different from the original.  Compliant and controlled with current medication requip   • Shortness of breath        Past surgical History:  Past Surgical History:   Procedure Laterality Date   • BACK SURGERY  2007    lower   • CARDIAC CATHETERIZATION Right 04/15/2022    Procedure: Left Heart Cath;  Surgeon: Laila Alvarez MD;  Location: [facility] INVASIVE LOCATION;  Service: Cardiovascular;  Laterality: Right;   • CARDIAC CATHETERIZATION  4/15/2022   • COLON SURGERY         Social History:  Social History     Socioeconomic History   • Marital status:    Tobacco Use   • Smoking status: Former Smoker     Packs/day: 2.00     Years: 20.00     Pack years: 40.00     Types: Cigarettes, Cigarettes     Quit date: 1998     Years since quittin.7   • Smokeless tobacco: Never Used   Vaping Use   • Vaping Use: Never used   Substance and Sexual Activity   • Alcohol use: Yes     Comment: socially   • Drug use: Never     Comment: CBD oils   •  Sexual activity: Not Currently     Partners: Male     Birth control/protection: None       Review of Systems:  The following systems were reviewed as they relate to the cardiovascular system: Constitutional, Eyes, ENT, Cardiovascular, Respiratory, Gastrointestinal, Integumentary, Neurological, Psychiatric, Hematologic, Endocrine, Musculoskeletal, and Genitourinary. The pertinent cardiovascular findings are reported above with all other cardiovascular points within those systems being negative.    Diagnostic Study Review:     Current Electrocardiogram:    ECG 12 Lead    Date/Time: 9/21/2022 2:46 PM  Performed by: Laila Alvarez MD  Authorized by: Laila Alvarez MD   Comparison: compared with previous ECG   Similar to previous ECG  Rhythm: sinus rhythm  Ectopy: unifocal PVCs  Rate: normal  BPM: 67  Conduction: conduction normal  QRS axis: normal  Other findings: non-specific ST-T wave changes    Clinical impression: abnormal EKG              NOTE: The following portions of the patient's history were reviewed and updated this visit as appropriate: allergies, current medications, past family history, past medical history, past social history, past surgical history and problem list.

## 2022-10-05 ENCOUNTER — TELEPHONE (OUTPATIENT)
Dept: FAMILY MEDICINE CLINIC | Facility: CLINIC | Age: 72
End: 2022-10-05

## 2022-10-05 NOTE — TELEPHONE ENCOUNTER
Called pt regarding mammo order, pt will call [facility] to get scheduled and will call office to inform of date.

## 2022-10-20 ENCOUNTER — OFFICE VISIT (OUTPATIENT)
Dept: NEUROLOGY | Facility: CLINIC | Age: 72
End: 2022-10-20

## 2022-10-20 VITALS
TEMPERATURE: 97.5 F | DIASTOLIC BLOOD PRESSURE: 77 MMHG | HEART RATE: 61 BPM | WEIGHT: 182.2 LBS | BODY MASS INDEX: 30.35 KG/M2 | SYSTOLIC BLOOD PRESSURE: 121 MMHG | HEIGHT: 65 IN

## 2022-10-20 DIAGNOSIS — G47.00 INSOMNIA, UNSPECIFIED TYPE: ICD-10-CM

## 2022-10-20 DIAGNOSIS — E55.9 VITAMIN D DEFICIENCY: ICD-10-CM

## 2022-10-20 DIAGNOSIS — E11.69 TYPE 2 DIABETES MELLITUS WITH OTHER SPECIFIED COMPLICATION, WITHOUT LONG-TERM CURRENT USE OF INSULIN: ICD-10-CM

## 2022-10-20 DIAGNOSIS — R41.89 COGNITIVE IMPAIRMENT: Primary | ICD-10-CM

## 2022-10-20 DIAGNOSIS — G47.33 OSA (OBSTRUCTIVE SLEEP APNEA): ICD-10-CM

## 2022-10-20 DIAGNOSIS — E53.8 B12 DEFICIENCY: ICD-10-CM

## 2022-10-20 DIAGNOSIS — G25.81 RESTLESS LEG SYNDROME: ICD-10-CM

## 2022-10-20 PROCEDURE — 99204 OFFICE O/P NEW MOD 45 MIN: CPT | Performed by: PSYCHIATRY & NEUROLOGY

## 2022-10-20 RX ORDER — DONEPEZIL HYDROCHLORIDE 5 MG/1
10 TABLET, FILM COATED ORAL NIGHTLY
Qty: 90 TABLET | Refills: 3 | Status: SHIPPED | OUTPATIENT
Start: 2022-10-20 | End: 2023-03-03

## 2022-10-24 DIAGNOSIS — R41.89 COGNITIVE IMPAIRMENT: ICD-10-CM

## 2022-10-24 RX ORDER — DONEPEZIL HYDROCHLORIDE 5 MG/1
TABLET, FILM COATED ORAL
Qty: 60 TABLET | OUTPATIENT
Start: 2022-10-24

## 2022-10-27 RX ORDER — HYDROXYZINE HYDROCHLORIDE 10 MG/1
10 TABLET, FILM COATED ORAL DAILY
Qty: 90 TABLET | Refills: 1 | Status: SHIPPED | OUTPATIENT
Start: 2022-10-27

## 2022-10-27 RX ORDER — MEMANTINE HYDROCHLORIDE 10 MG/1
10 TABLET ORAL 2 TIMES DAILY
Qty: 180 TABLET | Refills: 1 | Status: SHIPPED | OUTPATIENT
Start: 2022-10-27

## 2022-10-27 RX ORDER — BUPROPION HYDROCHLORIDE 100 MG/1
100 TABLET ORAL 2 TIMES DAILY
Qty: 180 TABLET | Refills: 0 | Status: SHIPPED | OUTPATIENT
Start: 2022-10-27 | End: 2023-02-08

## 2022-11-02 ENCOUNTER — LAB (OUTPATIENT)
Dept: LAB | Facility: HOSPITAL | Age: 72
End: 2022-11-02

## 2022-11-02 ENCOUNTER — HOSPITAL ENCOUNTER (OUTPATIENT)
Dept: SLEEP MEDICINE | Facility: HOSPITAL | Age: 72
Discharge: HOME | End: 2022-11-02
Admitting: PSYCHIATRY & NEUROLOGY

## 2022-11-02 DIAGNOSIS — R41.89 COGNITIVE IMPAIRMENT: ICD-10-CM

## 2022-11-02 DIAGNOSIS — G47.33 OSA (OBSTRUCTIVE SLEEP APNEA): ICD-10-CM

## 2022-11-02 LAB — RPR SER QL: NORMAL

## 2022-11-02 PROCEDURE — 86592 SYPHILIS TEST NON-TREP QUAL: CPT

## 2022-11-02 PROCEDURE — 36415 COLL VENOUS BLD VENIPUNCTURE: CPT

## 2022-11-02 PROCEDURE — 84207 ASSAY OF VITAMIN B-6: CPT

## 2022-11-02 PROCEDURE — 84446 ASSAY OF VITAMIN E: CPT

## 2022-11-02 PROCEDURE — 95806 SLEEP STUDY UNATT&RESP EFFT: CPT

## 2022-11-02 PROCEDURE — 95806 SLEEP STUDY UNATT&RESP EFFT: CPT | Performed by: PSYCHIATRY & NEUROLOGY

## 2022-11-05 LAB — PYRIDOXAL PHOS SERPL-MCNC: 5.9 UG/L (ref 3.4–65.2)

## 2022-11-11 LAB
A-TOCOPHEROL VIT E SERPL-MCNC: 9.6 MG/L (ref 9–29)
GAMMA TOCOPHEROL SERPL-MCNC: 2.2 MG/L (ref 0.5–4.9)

## 2022-11-14 ENCOUNTER — TELEPHONE (OUTPATIENT)
Dept: NEUROLOGY | Facility: CLINIC | Age: 72
End: 2022-11-14

## 2022-11-15 ENCOUNTER — OFFICE VISIT (OUTPATIENT)
Dept: FAMILY MEDICINE CLINIC | Facility: CLINIC | Age: 72
End: 2022-11-15

## 2022-11-15 VITALS
SYSTOLIC BLOOD PRESSURE: 142 MMHG | HEIGHT: 65 IN | OXYGEN SATURATION: 94 % | DIASTOLIC BLOOD PRESSURE: 80 MMHG | BODY MASS INDEX: 30.66 KG/M2 | WEIGHT: 184 LBS | HEART RATE: 72 BPM

## 2022-11-15 DIAGNOSIS — G25.81 RESTLESS LEG SYNDROME: ICD-10-CM

## 2022-11-15 DIAGNOSIS — E03.9 ACQUIRED HYPOTHYROIDISM: ICD-10-CM

## 2022-11-15 DIAGNOSIS — E11.69 TYPE 2 DIABETES MELLITUS WITH OTHER SPECIFIED COMPLICATION, WITHOUT LONG-TERM CURRENT USE OF INSULIN: ICD-10-CM

## 2022-11-15 DIAGNOSIS — G62.9 NEUROPATHY: ICD-10-CM

## 2022-11-15 DIAGNOSIS — R41.89 COGNITIVE IMPAIRMENT: Primary | ICD-10-CM

## 2022-11-15 DIAGNOSIS — F41.9 ANXIETY: ICD-10-CM

## 2022-11-15 DIAGNOSIS — I10 PRIMARY HYPERTENSION: ICD-10-CM

## 2022-11-15 LAB — HBA1C MFR BLD: 6.8 % (ref 3.5–5.6)

## 2022-11-15 PROCEDURE — 84443 ASSAY THYROID STIM HORMONE: CPT | Performed by: NURSE PRACTITIONER

## 2022-11-15 PROCEDURE — 84439 ASSAY OF FREE THYROXINE: CPT | Performed by: NURSE PRACTITIONER

## 2022-11-15 PROCEDURE — 90662 IIV NO PRSV INCREASED AG IM: CPT | Performed by: NURSE PRACTITIONER

## 2022-11-15 PROCEDURE — 96160 PT-FOCUSED HLTH RISK ASSMT: CPT | Performed by: NURSE PRACTITIONER

## 2022-11-15 PROCEDURE — 84481 FREE ASSAY (FT-3): CPT | Performed by: NURSE PRACTITIONER

## 2022-11-15 PROCEDURE — G0439 PPPS, SUBSEQ VISIT: HCPCS | Performed by: NURSE PRACTITIONER

## 2022-11-15 PROCEDURE — 1126F AMNT PAIN NOTED NONE PRSNT: CPT | Performed by: NURSE PRACTITIONER

## 2022-11-15 PROCEDURE — 36415 COLL VENOUS BLD VENIPUNCTURE: CPT | Performed by: NURSE PRACTITIONER

## 2022-11-15 PROCEDURE — 1170F FXNL STATUS ASSESSED: CPT | Performed by: NURSE PRACTITIONER

## 2022-11-15 PROCEDURE — G0008 ADMIN INFLUENZA VIRUS VAC: HCPCS | Performed by: NURSE PRACTITIONER

## 2022-11-15 PROCEDURE — 83036 HEMOGLOBIN GLYCOSYLATED A1C: CPT | Performed by: NURSE PRACTITIONER

## 2022-11-15 PROCEDURE — 1160F RVW MEDS BY RX/DR IN RCRD: CPT | Performed by: NURSE PRACTITIONER

## 2022-11-15 NOTE — ASSESSMENT & PLAN NOTE
1.  Repeat manual blood pressure is 142/80, stable  2.  Patient to continue medications as prescribed

## 2022-11-15 NOTE — ASSESSMENT & PLAN NOTE
1.  Continue metformin as prescribed  2.  ADA diet  3.  Gabapentin for neuropathy  4.  Check labs today  5.  Recommended annual eye exam

## 2022-11-15 NOTE — ASSESSMENT & PLAN NOTE
1.  Reviewed neurology notes  2.  Continue medications as prescribed  3.  Reviewed sleep study, CPAP is indicated.  Patient has not been called by neurology results, advised they reach out

## 2022-11-15 NOTE — PROGRESS NOTES
The ABCs of the Annual Wellness Visit  Subsequent Medicare Wellness Visit    Chief Complaint   Patient presents with   • Medicare Wellness-subsequent   • Follow-up     3 month follow up DM       Subjective    History of Present Illness:  Radha Barbour is a 71 y.o. female who presents for a Subsequent Medicare Wellness Visit.  Patient followed by Neurology regarding cognitive impairment, MRI brain noted to be normal.  Aricept was increased to 10 mg daily, continues Namenda 10mg BID.  She has chronic dizziness and recent falls.  Patient referred to physical therapy, reports it helped but did not go back. A Rollator was ordered, patient using minimally. She is ambulating more with a cane.  Patient has not had any falls since last visit.  Patient also taking Requip 0.5 mg nightly for restless leg syndrome, symptoms are stable.  Patient has hypertension, prescribed 0.2 mg of clonidine daily, Norvasc 5 mg daily and ramipril 5 mg daily. She is taking metformin 1000 mg daily for type 2 diabetes.  Last A1c was 6.9.  Patient is due for annual eye exam.  She has diabetic neuropathy, stable on gabapentin.  Patient has hypothyroidism, taking Synthroid 137 mcg daily.  Dosage adjusted r/t fluctuating TSH. Patient is taking Wellbutrin and Effexor for depression and anxiety, symptoms are stable.    The following portions of the patient's history were reviewed and   updated as appropriate: allergies, current medications, past family history, past medical history, past social history, past surgical history and problem list.    Compared to one year ago, the patient feels her physical   health is better.    Compared to one year ago, the patient feels her mental   health is the same.    Recent Hospitalizations:  She was not admitted to the hospital during the last year.       Current Medical Providers:  Patient Care Team:  Michell Ramirez APRN as PCP - General (Nurse Practitioner)    Outpatient Medications Prior to Visit    Medication Sig Dispense Refill   • amLODIPine (NORVASC) 5 MG tablet Take 1 tablet by mouth Daily. 90 tablet 3   • aspirin 81 MG EC tablet Take 81 mg by mouth Daily.     • buPROPion (WELLBUTRIN) 100 MG tablet Take 1 tablet by mouth 2 (Two) Times a Day. 180 tablet 0   • CBD (cannabidiol) oral oil Take 1 drop by mouth Daily As Needed.     • cloNIDine (CATAPRES) 0.2 MG tablet Take 1 tablet by mouth Daily. 90 tablet 1   • donepezil (Aricept) 5 MG tablet Take 2 tablets by mouth Every Night. 90 tablet 3   • gabapentin (NEURONTIN) 300 MG capsule Take 1 capsule by mouth 3 (Three) Times a Day. 270 capsule 0   • hydrOXYzine (ATARAX) 10 MG tablet Take 1 tablet by mouth Daily. 90 tablet 1   • levothyroxine (SYNTHROID, LEVOTHROID) 150 MCG tablet TAKE ONE TABLET BY MOUTH DAILY 30 tablet 1   • meclizine (ANTIVERT) 25 MG tablet TAKE 1 TABLET THREE TIMES DAILY AS NEEDED FOR DIZZINESS 180 tablet 0   • memantine (Namenda) 10 MG tablet Take 1 tablet by mouth 2 (Two) Times a Day. 180 tablet 1   • metFORMIN (Glucophage) 1000 MG tablet Take 1 tablet by mouth Daily With Breakfast. 90 tablet 1   • omeprazole (priLOSEC) 40 MG capsule Take 1 capsule by mouth Daily. 90 capsule 1   • pravastatin (Pravachol) 20 MG tablet Take 1 tablet by mouth Daily. 90 tablet 1   • ramipril (ALTACE) 2.5 MG capsule Take 2 capsules by mouth Daily. 90 capsule 1   • rOPINIRole (REQUIP) 0.5 MG tablet Take 1 tablet by mouth every night at bedtime. 90 tablet 1   • venlafaxine XR (EFFEXOR-XR) 150 MG 24 hr capsule Take 1 capsule by mouth 2 (Two) Times a Day. 180 capsule 1   • vitamin B-12 (CYANOCOBALAMIN) 1000 MCG tablet Take 1,000 mcg by mouth Daily.     • Vitamin D, Cholecalciferol, 50 MCG (2000 UT) capsule Take 2,000 Units by mouth Daily.       No facility-administered medications prior to visit.       No opioid medication identified on active medication list. I have reviewed chart for other potential  high risk medication/s and harmful drug interactions in the  elderly.          Aspirin is on active medication list. Aspirin use is indicated based on review of current medical condition/s. Pros and cons of this therapy have been discussed today. Benefits of this medication outweigh potential harm.  Patient has been encouraged to continue taking this medication.  .      Patient Active Problem List   Diagnosis   • B12 deficiency   • Back pain, chronic   • Depression   • Diabetic peripheral neuropathy (HCC)   • Restless leg syndrome   • Osteoporosis   • OAB (overactive bladder)   • Lumbosacral radiculopathy   • Insomnia   • Hypothyroidism   • HTN, goal below 130/80   • Gastric reflux   • Environmental and seasonal allergies   • Dyslipidemia   • DM type 2, goal HbA1c < 7% ([facility])   • Angina pectoris ([facility])   • SOB (shortness of breath)   • Abnormal results of cardiovascular function studies   • Dizziness   • Cognitive impairment   • Vitamin D deficiency   • Diabetes mellitus ([facility])   • Primary hypertension   • Anxiety   • Neuropathy   • Preventative health care   • Coronary artery disease involving native heart without angina pectoris   • Screening mammogram for breast cancer   • Frequent falls     Advance Care Planning  Advance Directive is not on file.  ACP discussion was held with the patient during this visit. Patient does not have an advance directive, information provided.    Review of Systems   Constitutional: Negative for fatigue and fever.   Respiratory: Negative for cough and shortness of breath.    Cardiovascular: Negative for chest pain and leg swelling.   Gastrointestinal: Negative for constipation and diarrhea.   Genitourinary: Negative for dysuria.   Skin: Negative for rash.   Neurological: Positive for dizziness.   Psychiatric/Behavioral: The patient is not nervous/anxious.         Objective    Vitals:    11/15/22 1305 11/15/22 1417   BP: (!) 182/80 142/80   BP Location: Left arm Left arm   Patient Position: Sitting Sitting   Cuff Size: Adult Adult   Pulse: 72   "  SpO2: 94%    Weight: 83.5 kg (184 lb)    Height: 165.1 cm (65\")    PainSc: 0-No pain      Estimated body mass index is 30.62 kg/m² as calculated from the following:    Height as of this encounter: 165.1 cm (65\").    Weight as of this encounter: 83.5 kg (184 lb).    BMI is >= 30 and <35. (Class 1 Obesity). The following options were offered after discussion;: exercise counseling/recommendations and nutrition counseling/recommendations      Does the patient have evidence of cognitive impairment? Yes     ATTENTION  What is the year: correct  What is the month of the year: correct  What is the day of the week?: correct  What is the date?: incorrect  MEMORY  Repeat address three times, only score third attempt: [address] [place], [place]: 5  HOW MANY ANIMALS DID THE PATIENT NAME  Verbal Fluency -- Animal Names (0-25): 14-16  CLOCK DRAWING  Clock Drawing: All Correct  MEMORY RECALL  Tell me what you remember about that name and address we were repeating at the beginning: 3  ACE TOTAL SCORE  Total ACE Score - <25/30 strongly suggests cognitive impairment; <21/30 almost certainly shows dementia: 21        Physical Exam  Constitutional:       Appearance: Normal appearance.   HENT:      Head: Normocephalic.   Cardiovascular:      Rate and Rhythm: Normal rate and regular rhythm.   Pulmonary:      Effort: Pulmonary effort is normal.      Breath sounds: Normal breath sounds.   Abdominal:      General: Abdomen is flat. Bowel sounds are normal.      Palpations: Abdomen is soft.   Musculoskeletal:         General: Normal range of motion.      Cervical back: Neck supple.      Right lower leg: No edema.      Left lower leg: No edema.   Skin:     General: Skin is warm and dry.   Neurological:      Mental Status: She is alert and oriented to person, place, and time.      Gait: Gait is intact.   Psychiatric:         Attention and Perception: Attention normal.         Mood and Affect: Mood normal.         Speech: " Speech normal.                 HEALTH RISK ASSESSMENT    Smoking Status:  Social History     Tobacco Use   Smoking Status Former   • Packs/day: 2.00   • Years: 20.00   • Pack years: 40.00   • Types: Cigarettes   • Quit date: 1998   • Years since quittin.8   Smokeless Tobacco Never     Alcohol Consumption:  Social History     Substance and Sexual Activity   Alcohol Use Yes    Comment: socially     Fall Risk Screen:    KRISTEN Fall Risk Assessment was completed, and patient is at HIGH risk for falls. Assessment completed on:11/15/2022    Depression Screening:  PHQ-2/PHQ-9 Depression Screening 11/15/2022   Retired PHQ-9 Total Score -   Retired Total Score -   Little Interest or Pleasure in Doing Things 0-->not at all   Feeling Down, Depressed or Hopeless 0-->not at all   PHQ-9: Brief Depression Severity Measure Score 0       Health Habits and Functional and Cognitive Screening:  Functional & Cognitive Status 11/15/2022   Do you have difficulty preparing food and eating? No   Do you have difficulty bathing yourself, getting dressed or grooming yourself? No   Do you have difficulty using the toilet? No   Do you have difficulty moving around from place to place? No   Do you have trouble with steps or getting out of a bed or a chair? No   Do you need help using the phone?  No   Are you deaf or do you have serious difficulty hearing?  No   Do you need help with transportation? No   Do you need help shopping? No   Do you need help preparing meals?  No   Do you need help with housework?  No   Do you need help with laundry? No   Do you need help taking your medications? No   Do you need help managing money? No   Do you ever drive or ride in a car without wearing a seat belt? No   Have you felt unusual stress, anger or loneliness in the last month? No   Who do you live with? Sibling   If you need help, do you have trouble finding someone available to you? No   Have you been bothered in the last four weeks by sexual  problems? No   Do you have difficulty concentrating, remembering or making decisions? No       Age-appropriate Screening Schedule:  Refer to the list below for future screening recommendations based on patient's age, sex and/or medical conditions. Orders for these recommended tests are listed in the plan section. The patient has been provided with a written plan.    Health Maintenance   Topic Date Due   • ZOSTER VACCINE (1 of 2) Never done   • DIABETIC FOOT EXAM  Never done   • DIABETIC EYE EXAM  12/09/2021   • MAMMOGRAM  02/19/2022   • HEMOGLOBIN A1C  12/02/2022   • LIPID PANEL  06/02/2023   • URINE MICROALBUMIN  06/02/2023   • DXA SCAN  04/27/2024   • TDAP/TD VACCINES (3 - Td or Tdap) 06/13/2028   • INFLUENZA VACCINE  Completed              Assessment & Plan   CMS Preventative Services Quick Reference  Risk Factors Identified During Encounter  Cardiovascular Disease  Dementia/Memory   Depression/Dysphoria  Fall Risk-High or Moderate  Immunizations Discussed/Encouraged (specific Immunizations; Tdap, Influenza, Prevnar 20 (Pneumococcal 20-valent conjugate), Shingrix and COVID19  Inactivity/Sedentary  Obesity/Overweight   Urinary Incontinence  The above risks/problems have been discussed with the patient.  Follow up actions/plans if indicated are seen below in the Assessment/Plan Section.  Pertinent information has been shared with the patient in the After Visit Summary.    Diagnoses and all orders for this visit:    1. Cognitive impairment (Primary)  Assessment & Plan:  1.  Reviewed neurology notes  2.  Continue medications as prescribed  3.  Reviewed sleep study, CPAP is indicated.  Patient has not been called by neurology results, advised they reach out      2. Acquired hypothyroidism  Assessment & Plan:  1.  Continue Synthroid as prescribed  2.  Check thyroid panel today    Orders:  -     TSH  -     T4, Free  -     T3, Free    3. Type 2 diabetes mellitus with other specified complication, without long-term  current use of insulin (HCC)  Assessment & Plan:  1.  Continue metformin as prescribed  2.  ADA diet  3.  Gabapentin for neuropathy  4.  Check labs today  5.  Recommended annual eye exam    Orders:  -     Hemoglobin A1c    4. Primary hypertension  Assessment & Plan:  1.  Repeat manual blood pressure is 142/80, stable  2.  Patient to continue medications as prescribed      5. Anxiety  Assessment & Plan:  1.  Stable, continue Wellbutrin and Effexor as prescribed      6. Neuropathy    7. Restless leg syndrome  Assessment & Plan:  1.  Controlled with Requip      Other orders  -     Fluzone High-Dose 65+yrs ([id number])      Follow Up:   Return in about 4 months (around 3/15/2023) for DMII.     An After Visit Summary and PPPS were made available to the patient.          I spent 30 minutes caring for Radha on this date of service. This time includes time spent by me in the following activities:preparing for the visit, reviewing tests, obtaining and/or reviewing a separately obtained history, performing a medically appropriate examination and/or evaluation , counseling and educating the patient/family/caregiver, ordering medications, tests, or procedures, documenting information in the medical record, independently interpreting results and communicating that information with the patient/family/caregiver and care coordination

## 2022-11-16 ENCOUNTER — TELEPHONE (OUTPATIENT)
Dept: FAMILY MEDICINE CLINIC | Facility: CLINIC | Age: 72
End: 2022-11-16

## 2022-11-16 LAB
T3FREE SERPL-MCNC: 3.59 PG/ML (ref 2–4.4)
T4 FREE SERPL-MCNC: 1.97 NG/DL (ref 0.93–1.7)
TSH SERPL DL<=0.05 MIU/L-ACNC: 0.01 UIU/ML (ref 0.27–4.2)

## 2022-11-16 RX ORDER — LEVOTHYROXINE SODIUM 0.12 MG/1
125 TABLET ORAL DAILY
Qty: 30 TABLET | Refills: 1 | Status: SHIPPED | OUTPATIENT
Start: 2022-11-16 | End: 2022-12-08 | Stop reason: SDUPTHER

## 2022-11-29 ENCOUNTER — TELEPHONE (OUTPATIENT)
Dept: FAMILY MEDICINE CLINIC | Facility: CLINIC | Age: 72
End: 2022-11-29

## 2022-12-06 RX ORDER — MECLIZINE HYDROCHLORIDE 25 MG/1
TABLET ORAL
Qty: 180 TABLET | Refills: 0 | OUTPATIENT
Start: 2022-12-06

## 2022-12-06 RX ORDER — GABAPENTIN 300 MG/1
CAPSULE ORAL
Qty: 270 CAPSULE | Refills: 0 | OUTPATIENT
Start: 2022-12-06

## 2022-12-06 RX ORDER — RAMIPRIL 2.5 MG/1
5 CAPSULE ORAL DAILY
Qty: 180 CAPSULE | OUTPATIENT
Start: 2022-12-06

## 2022-12-08 RX ORDER — RAMIPRIL 2.5 MG/1
5 CAPSULE ORAL DAILY
Qty: 90 CAPSULE | Refills: 1 | Status: SHIPPED | OUTPATIENT
Start: 2022-12-08 | End: 2023-02-08 | Stop reason: SDUPTHER

## 2022-12-08 RX ORDER — GABAPENTIN 300 MG/1
300 CAPSULE ORAL 3 TIMES DAILY
Qty: 270 CAPSULE | Refills: 0 | Status: SHIPPED | OUTPATIENT
Start: 2022-12-08 | End: 2023-02-08 | Stop reason: SDUPTHER

## 2022-12-08 RX ORDER — LEVOTHYROXINE SODIUM 0.12 MG/1
125 TABLET ORAL DAILY
Qty: 30 TABLET | Refills: 1 | Status: SHIPPED | OUTPATIENT
Start: 2022-12-08 | End: 2023-02-08 | Stop reason: SDUPTHER

## 2022-12-08 RX ORDER — MECLIZINE HYDROCHLORIDE 25 MG/1
25 TABLET ORAL 3 TIMES DAILY PRN
Qty: 180 TABLET | Refills: 0 | Status: SHIPPED | OUTPATIENT
Start: 2022-12-08 | End: 2023-03-03

## 2022-12-14 ENCOUNTER — TELEPHONE (OUTPATIENT)
Dept: NEUROLOGY | Facility: CLINIC | Age: 72
End: 2022-12-14

## 2022-12-14 DIAGNOSIS — G47.33 OBSTRUCTIVE SLEEP APNEA: Primary | ICD-10-CM

## 2023-01-05 RX ORDER — AMLODIPINE BESYLATE 10 MG/1
TABLET ORAL
COMMUNITY
Start: 2022-12-09 | End: 2023-01-10

## 2023-01-05 RX ORDER — LEVOTHYROXINE SODIUM 137 UG/1
TABLET ORAL
COMMUNITY
Start: 2022-10-10 | End: 2023-01-10

## 2023-01-10 ENCOUNTER — OFFICE VISIT (OUTPATIENT)
Dept: ENDOCRINOLOGY | Facility: CLINIC | Age: 73
End: 2023-01-10
Payer: MEDICARE

## 2023-01-10 VITALS
SYSTOLIC BLOOD PRESSURE: 98 MMHG | OXYGEN SATURATION: 93 % | RESPIRATION RATE: 20 BRPM | HEART RATE: 56 BPM | DIASTOLIC BLOOD PRESSURE: 52 MMHG

## 2023-01-10 DIAGNOSIS — E55.9 VITAMIN D DEFICIENCY: ICD-10-CM

## 2023-01-10 DIAGNOSIS — E03.9 ACQUIRED HYPOTHYROIDISM: Primary | ICD-10-CM

## 2023-01-10 PROCEDURE — 99203 OFFICE O/P NEW LOW 30 MIN: CPT | Performed by: INTERNAL MEDICINE

## 2023-01-10 NOTE — PATIENT INSTRUCTIONS
Continue chair exercises.  Continue levothyroxine 125 mcg daily, 1st thing in am, by itself.  Continue vit D supplements.  Have labs rechecked in 2 weeks.  Will call you with the lab results.  Hold Amlodipine tomorrow.  F/u in 1y, with labs prior.

## 2023-01-12 ENCOUNTER — PATIENT ROUNDING (BHMG ONLY) (OUTPATIENT)
Dept: ENDOCRINOLOGY | Facility: CLINIC | Age: 73
End: 2023-01-12
Payer: MEDICARE

## 2023-01-16 ENCOUNTER — CLINICAL SUPPORT (OUTPATIENT)
Dept: FAMILY MEDICINE CLINIC | Facility: CLINIC | Age: 73
End: 2023-01-16
Payer: MEDICARE

## 2023-01-16 ENCOUNTER — TELEPHONE (OUTPATIENT)
Dept: FAMILY MEDICINE CLINIC | Facility: CLINIC | Age: 73
End: 2023-01-16

## 2023-01-16 DIAGNOSIS — R30.0 DYSURIA: Primary | ICD-10-CM

## 2023-01-16 LAB
BILIRUB BLD-MCNC: NEGATIVE MG/DL
CLARITY, POC: ABNORMAL
COLOR UR: YELLOW
GLUCOSE UR STRIP-MCNC: NEGATIVE MG/DL
KETONES UR QL: NEGATIVE
LEUKOCYTE EST, POC: ABNORMAL
NITRITE UR-MCNC: POSITIVE MG/ML
PH UR: 5 [PH] (ref 5–8)
PROT UR STRIP-MCNC: ABNORMAL MG/DL
RBC # UR STRIP: ABNORMAL /UL
SP GR UR: 1.03 (ref 1–1.03)
UROBILINOGEN UR QL: NORMAL

## 2023-01-16 PROCEDURE — 81002 URINALYSIS NONAUTO W/O SCOPE: CPT | Performed by: NURSE PRACTITIONER

## 2023-01-16 PROCEDURE — 87186 SC STD MICRODIL/AGAR DIL: CPT | Performed by: NURSE PRACTITIONER

## 2023-01-16 PROCEDURE — 87086 URINE CULTURE/COLONY COUNT: CPT | Performed by: NURSE PRACTITIONER

## 2023-01-16 PROCEDURE — 87077 CULTURE AEROBIC IDENTIFY: CPT | Performed by: NURSE PRACTITIONER

## 2023-01-16 NOTE — TELEPHONE ENCOUNTER
Patient needs to be seen but I don't currently have any openings this week. Can they bring her in to drop off a urine sample today? If that is normal, we may need to refer to ER.

## 2023-01-16 NOTE — TELEPHONE ENCOUNTER
Pt came into the office to leave a urine sample. Results sent in a different message. Please advise.

## 2023-01-16 NOTE — TELEPHONE ENCOUNTER
Caller: KARO JEFF    Relationship: Emergency Contact    Best call back number: [phone]    What is the best time to reach you: ANYTIME    Who are you requesting to speak with (clinical staff, provider,  specific staff member): TASHA    What was the call regarding: PATIENT'S DAUGHTER STATES THAT PATIENT HAD AN ENDOCRINOLOGIST APPOINTMENT ON 1/10/23 AND AT THE APPOINTMENT HER LEGS GAVE OUT AND SHE WAS UNABLE TO GET BACK ON HER FEET THE NURSE AND HER HAD TO HELP. PATIENT'S DAUGHTER STATES THAT HER BLOOD PRESSURE AT THE TIME WAS 90ISH/50SIH. SHE STATES THAT SHE HAS BEEN DIZZY,DISOREINTED,AND LEGS GOING. PATIENT'S DAUGHTER STATES SHE HAD AN EPISODE AT [facility] WHERE SHE WAS HEARING MUSIC AND HER LEGS GAVE OUT  ON 1/15/23 AND HAD TO BE ESCORTED OUT BY TWO MEMBERS. PATIENT'S DAUGHTER STATES SHE HAS BEEN HAVING AUDITORY HALLUCINATIONS AS WELL AND CLAIMS SHE WAS HEARING MUSIC AND THOUGHT SHE WAS PLAYING GAMES WITH HER DAUGHTER AND HER COUSIN. PATIENT'S DAUGHTER STATES THAT THE AUDITORY HALLUCINATIONS HAVE HAPPENED BEFORE WHEN SHE HAD A UTI. SHE STATES PATIENT STATED SHE HAD PAIN IN VAGINAL AREA LAST WEEK BUT IT WAS NOT CONSTANT OR ONGOING. PATIENT DAUGHTER IS REQUESTING A CALLBACK FOR ADVICE. PLEASE ADVISE.

## 2023-01-16 NOTE — TELEPHONE ENCOUNTER
Spoke to pt's daughter Mal, Mal notified of message from Paloma and she is going to call the pt now and ask if she can come in for urine sample today

## 2023-01-16 NOTE — PROGRESS NOTES
Pt came into the office to leave a urine sample because she is having UTI symptoms. Urine was collected and results given to provider for review.     MABEL Pinon

## 2023-01-17 RX ORDER — NITROFURANTOIN 25; 75 MG/1; MG/1
100 CAPSULE ORAL 2 TIMES DAILY
Qty: 14 CAPSULE | Refills: 0 | Status: SHIPPED | OUTPATIENT
Start: 2023-01-17

## 2023-01-17 NOTE — PROGRESS NOTES
Urine is consistent with UTI.  Antibiotics sent to the pharmacy.  They should call if symptoms persist or worsen

## 2023-01-18 LAB — BACTERIA SPEC AEROBE CULT: ABNORMAL

## 2023-01-18 RX ORDER — CEPHALEXIN 500 MG/1
500 CAPSULE ORAL 2 TIMES DAILY
Qty: 14 CAPSULE | Refills: 0 | Status: SHIPPED | OUTPATIENT
Start: 2023-01-18 | End: 2023-01-25

## 2023-01-20 NOTE — PROGRESS NOTES
[facility]    Referring Provider: Michell Ramirez AP*  Reason for Consultation: Thyroid evaluation & management.    Patient Care Team:  Michell Ramirez APRN as PCP - General (Nurse Practitioner)    Chief complaint Hypothyroidism (New Patient)      Subjective .     History of present illness:    This is a  72 y.o. female with hypothyroidism since her 30's.  Felt tired, was checked & started thyroid replacement.  Having trouble regulating her dose.  Referred in June. Started taking levothyroxine by itself in am since July 2022.  Currently on 125 mcg daily since 12/8/2022. This was a dose decrease.  Recently started doing leg exercises.  Quit smoking in early 1990's.  Retired in 2008.  since 2012. Lives with her sister.  Vitamin D deficiency since 10/11/2019. Taking otc vit D 2,000 units daily.  Had jenn knee replacement & back surgery in the past.    Review of Systems  Review of Systems   HENT: Negative for trouble swallowing.    Eyes: Negative for blurred vision.   Respiratory: Positive for shortness of breath.         Snoring   Cardiovascular: Negative for leg swelling.   Gastrointestinal: Negative for nausea.   Endocrine: Positive for polydipsia. Negative for polyuria.   Genitourinary:        Nocturia   Musculoskeletal: Positive for back pain.   Skin:        Hair loss   Neurological: Positive for dizziness and headache.       History  Past Medical History:   Diagnosis Date   • Anxiety    • Arthritis 1998   • B12 deficiency 07/08/2016    Last Assessment & Plan:  Formatting of this note might be different from the original.  Compliant and controlled with current medication B 12 supplements . Lab today B 12   • Back pain, chronic 07/08/2016    Last Assessment & Plan:  Formatting of this note might be different from the original. Will refer to PT   • Cancer (HCC) 2009    Colon   • Colon polyp 2005    Colon Cancer   • Dementia (HCC) 2021    Doctor prescribed medicine for  dementia/memory loss   • Depression 2016    Last Assessment & Plan:  Formatting of this note might be different from the original.  Compliant and controlled with current medication   • Diabetic peripheral neuropathy (HCC) 2020    Last Assessment & Plan:  Formatting of this note might be different from the original. Stable   • Difficulty walking     Extreme unsteadiness on feet   • DM type 2, goal HbA1c < 7% (HCC) 2017    Last Assessment & Plan:  Formatting of this note might be different from the original.  Compliant and controlled with current diet.llabs today A1c   • Dyslipidemia 10/11/2017    Last Assessment & Plan:  Formatting of this note might be different from the original.  Compliant and controlled with current medication/statin therapy/labs today   • Environmental and seasonal allergies 10/11/2019   • Gastric reflux 2017   • HTN, goal below 130/80 02/10/2016    Last Assessment & Plan:  Formatting of this note might be different from the original. Compliant and controlled with current medication ramipril /labs today cmp to check renal function and electrolytes   • Hyperlipidemia    • Hypothyroidism     Last Assessment & Plan:  Formatting of this note might be different from the original. TSH low reduce levothyroxine.  Will check TSH today.   • Insomnia 2015   • Lumbosacral radiculopathy 2020    Last Assessment & Plan:  Formatting of this note might be different from the original.  Compliant and controlled with current medication Muscle relaxer   • Memory loss    • Osteoporosis 2020    Formatting of this note is different from the original. RADIOLOGY REPORT   FACILITY:  [facility] UNIT/AGE/GENDER: [id number]  OP      AGE:69 Y          SEX:F PATIENT NAME/:  GM DERAS    1950 UNIT NUMBER:  [id number] ACCOUNT NUMBER:  [id number] ACCESSION NUMBER:  [id number]     EXAMINATION: Bone densitometry of multiple sites, lumbar  spine, left hip and distal   • Restless leg syndrome 2018    Last Assessment & Plan:  Formatting of this note might be different from the original.  Compliant and controlled with current medication requip   • Shortness of breath      Past Surgical History:   Procedure Laterality Date   • BACK SURGERY  2007    lower   • CARDIAC CATHETERIZATION Right 04/15/2022    Procedure: Left Heart Cath;  Surgeon: Laila Alvarez MD;  Location: [facility] CATH INVASIVE LOC[facility];  Service: Cardiovascular;  Laterality: Right;   • CARDIAC CATHETERIZATION  4/15/2022   • COLON SURGERY     • REPLACEMENT TOTAL KNEE Left     redone in    • REPLACEMENT TOTAL KNEE Right      Family History   Problem Relation Age of Onset   • Hypertension Mother    • Arthritis Mother    • Colon cancer Mother    • Thyroid disease Mother    • Stroke Mother    • Cancer Mother         Colon   • Dementia Mother    • Heart disease Sister    • Heart disease Sister    • Dementia Maternal Aunt    • Dementia Maternal Aunt      Social History     Tobacco Use   • Smoking status: Former     Packs/day: 2.00     Years: 20.00     Pack years: 40.00     Types: Cigarettes     Quit date: 1998     Years since quittin.0   • Smokeless tobacco: Never   Vaping Use   • Vaping Use: Never used   Substance Use Topics   • Alcohol use: Yes     Comment: socially   • Drug use: Never     Comment: CBD oils         Allergies:  Patient has no known allergies.    Objective     Vital Signs       Vitals:    01/10/23 1350   BP: 98/52   Pulse: 56   Resp: 20   SpO2: 93%         Physical Exam:     General Appearance:    Alert, cooperative, in no acute distress   Head:    Normocephalic, without obvious abnormality, atraumatic   Eyes:            Lids and lashes normal, conjunctivae and sclerae normal, no   icterus, no pallor, corneas clear, PERRLA   Throat:   No oral lesions,  oral mucosa moist   Neck:   No adenopathy, supple,  no thyromegaly, no   carotid bruit    Lungs:     Clear     Heart:    Regular rhythm and normal rate   Chest Wall:    No abnormalities observed   Abdomen:     Normal bowel sounds, soft                 Extremities:   Moves all extremities well, no edema               Pulses:   Pulses palpable and equal bilaterally   Skin:   Dry   Neurologic:  DTR absent, able to feel the 10g monofilament       Results Review  I have reviewed the patient's new clinical results, labs & imaging.    Vit D level 32.9 on 6/22/2022.    Lab Results (last 24 hours)     ** No results found for the last 24 hours. **        Lab Results   Component Value Date    HGBA1C 6.8 (H) 11/15/2022     Lab Results   Component Value Date    TSH 0.015 (L) 11/15/2022     FreeT4 1.97, FreeT3 3.59    Assessment & Plan     1. Hypothyroidism  2. Vitamin D Deficiency    Continue chair exercises.  Continue levothyroxine 125 mcg daily, 1st thing in am, by itself.  Continue vit D supplements.  Have labs rechecked in 2 weeks.  Will call you with the lab results.  Hold Amlodipine tomorrow since BP was low. Keep a check on the BP.    I discussed the patients findings and my recommendations with patient    Peter Diez MD  01/20/23  18:11 EST

## 2023-02-08 DIAGNOSIS — R41.89 COGNITIVE IMPAIRMENT: ICD-10-CM

## 2023-02-08 RX ORDER — RAMIPRIL 2.5 MG/1
5 CAPSULE ORAL DAILY
Qty: 180 CAPSULE | OUTPATIENT
Start: 2023-02-08

## 2023-02-08 RX ORDER — GABAPENTIN 300 MG/1
300 CAPSULE ORAL 3 TIMES DAILY
Qty: 270 CAPSULE | Refills: 0 | Status: SHIPPED | OUTPATIENT
Start: 2023-02-08

## 2023-02-08 RX ORDER — BUPROPION HYDROCHLORIDE 100 MG/1
TABLET ORAL
Qty: 180 TABLET | Refills: 0 | Status: SHIPPED | OUTPATIENT
Start: 2023-02-08

## 2023-02-08 RX ORDER — DONEPEZIL HYDROCHLORIDE 5 MG/1
TABLET, FILM COATED ORAL
Qty: 60 TABLET | OUTPATIENT
Start: 2023-02-08

## 2023-02-08 RX ORDER — LEVOTHYROXINE SODIUM 0.12 MG/1
125 TABLET ORAL DAILY
Qty: 30 TABLET | Refills: 1 | Status: SHIPPED | OUTPATIENT
Start: 2023-02-08 | End: 2023-02-15 | Stop reason: SDUPTHER

## 2023-02-08 RX ORDER — RAMIPRIL 2.5 MG/1
5 CAPSULE ORAL DAILY
Qty: 90 CAPSULE | Refills: 1 | Status: SHIPPED | OUTPATIENT
Start: 2023-02-08

## 2023-02-08 RX ORDER — MECLIZINE HYDROCHLORIDE 25 MG/1
TABLET ORAL
Qty: 180 TABLET | Refills: 0 | OUTPATIENT
Start: 2023-02-08

## 2023-02-08 RX ORDER — GABAPENTIN 300 MG/1
CAPSULE ORAL
Qty: 270 CAPSULE | Refills: 0 | OUTPATIENT
Start: 2023-02-08

## 2023-02-13 ENCOUNTER — CLINICAL SUPPORT (OUTPATIENT)
Dept: FAMILY MEDICINE CLINIC | Facility: CLINIC | Age: 73
End: 2023-02-13
Payer: MEDICARE

## 2023-02-13 DIAGNOSIS — E55.9 VITAMIN D DEFICIENCY: ICD-10-CM

## 2023-02-13 DIAGNOSIS — E03.9 ACQUIRED HYPOTHYROIDISM: ICD-10-CM

## 2023-02-13 PROCEDURE — 84439 ASSAY OF FREE THYROXINE: CPT | Performed by: INTERNAL MEDICINE

## 2023-02-13 PROCEDURE — 84443 ASSAY THYROID STIM HORMONE: CPT | Performed by: INTERNAL MEDICINE

## 2023-02-13 PROCEDURE — 82306 VITAMIN D 25 HYDROXY: CPT | Performed by: INTERNAL MEDICINE

## 2023-02-13 PROCEDURE — 36415 COLL VENOUS BLD VENIPUNCTURE: CPT | Performed by: NURSE PRACTITIONER

## 2023-02-13 NOTE — PROGRESS NOTES
Venipuncture Blood Specimen Collection  Venipuncture performed in the left arm by Sachi Velásquez MA with good hemostasis. Patient tolerated the procedure well without complications.   02/13/23   Sachi Velásquez MA

## 2023-02-14 LAB
25(OH)D3 SERPL-MCNC: 44.7 NG/ML (ref 30–100)
T4 FREE SERPL-MCNC: 1.63 NG/DL (ref 0.93–1.7)
TSH SERPL DL<=0.05 MIU/L-ACNC: 0.12 UIU/ML (ref 0.27–4.2)

## 2023-02-15 ENCOUNTER — OFFICE VISIT (OUTPATIENT)
Dept: FAMILY MEDICINE CLINIC | Facility: CLINIC | Age: 73
End: 2023-02-15
Payer: MEDICARE

## 2023-02-15 VITALS
OXYGEN SATURATION: 95 % | WEIGHT: 180 LBS | HEIGHT: 65 IN | SYSTOLIC BLOOD PRESSURE: 142 MMHG | DIASTOLIC BLOOD PRESSURE: 80 MMHG | HEART RATE: 79 BPM | BODY MASS INDEX: 29.99 KG/M2

## 2023-02-15 DIAGNOSIS — F41.9 ANXIETY: ICD-10-CM

## 2023-02-15 DIAGNOSIS — E03.9 ACQUIRED HYPOTHYROIDISM: Primary | ICD-10-CM

## 2023-02-15 DIAGNOSIS — I10 PRIMARY HYPERTENSION: ICD-10-CM

## 2023-02-15 DIAGNOSIS — F33.1 MODERATE EPISODE OF RECURRENT MAJOR DEPRESSIVE DISORDER: ICD-10-CM

## 2023-02-15 DIAGNOSIS — J40 BRONCHITIS: ICD-10-CM

## 2023-02-15 DIAGNOSIS — E11.69 TYPE 2 DIABETES MELLITUS WITH OTHER SPECIFIED COMPLICATION, WITHOUT LONG-TERM CURRENT USE OF INSULIN: ICD-10-CM

## 2023-02-15 LAB — HBA1C MFR BLD: 6.8 % (ref 3.5–5.6)

## 2023-02-15 PROCEDURE — 99214 OFFICE O/P EST MOD 30 MIN: CPT | Performed by: NURSE PRACTITIONER

## 2023-02-15 PROCEDURE — 36415 COLL VENOUS BLD VENIPUNCTURE: CPT | Performed by: NURSE PRACTITIONER

## 2023-02-15 PROCEDURE — 83036 HEMOGLOBIN GLYCOSYLATED A1C: CPT | Performed by: NURSE PRACTITIONER

## 2023-02-15 RX ORDER — METHYLPREDNISOLONE 4 MG/1
TABLET ORAL
Qty: 21 TABLET | Refills: 0 | Status: SHIPPED | OUTPATIENT
Start: 2023-02-15

## 2023-02-15 RX ORDER — BENZONATATE 100 MG/1
100 CAPSULE ORAL 3 TIMES DAILY PRN
Qty: 30 CAPSULE | Refills: 0 | Status: SHIPPED | OUTPATIENT
Start: 2023-02-15

## 2023-02-15 RX ORDER — CEFDINIR 300 MG/1
300 CAPSULE ORAL 2 TIMES DAILY
Qty: 14 CAPSULE | Refills: 0 | Status: SHIPPED | OUTPATIENT
Start: 2023-02-15

## 2023-02-15 RX ORDER — LEVOTHYROXINE SODIUM 0.12 MG/1
125 TABLET ORAL DAILY
Qty: 30 TABLET | Refills: 1 | Status: SHIPPED | OUTPATIENT
Start: 2023-02-15 | End: 2023-04-03

## 2023-02-15 RX ORDER — ALBUTEROL SULFATE 90 UG/1
2 AEROSOL, METERED RESPIRATORY (INHALATION) EVERY 4 HOURS PRN
Qty: 8 G | Refills: 0 | Status: SHIPPED | OUTPATIENT
Start: 2023-02-15

## 2023-02-15 NOTE — ASSESSMENT & PLAN NOTE
1.  Medrol Dosepak  2.  Cefdinir 300 mg twice daily x7 days  3.  Albuterol as needed for dyspnea or wheeze  4.  Tessalon Perles as needed for cough  5.  Call if symptoms persist or worsen

## 2023-02-15 NOTE — PROGRESS NOTES
Venipuncture Blood Specimen Collection  Venipuncture performed in the left arm by Sachi Velásquez MA with good hemostasis. Patient tolerated the procedure well without complications.   02/15/23   Sachi Velásquez MA

## 2023-02-15 NOTE — PROGRESS NOTES
"Chief Complaint  Follow-up (4 month follow up for DM ) and Cough (For about a week or so with some shortness of air )    Subjective        Radha Barbour presents to [facility]  History of Present Illness    Patient presents for follow-up visit.    Patient presents with productive cough, nasal/chest congestion, wheezing and dyspnea x 1 week. She denies fever, sore throat, ear pain, headache or sinus pain.     Patient has type 2 diabetes, taking Metformin 1 g daily.  Last A1c was 6.8.  Glucose levels are not checked regularly.    Patient is taking Levothyroxine 125 mcg for hypothyroidism.  Most recent TSH low at 0.118.  She is followed by endocrinology.    Patient is taking Wellbutrin and Effexor for depression and anxiety, symptoms are stable.    Patient has hypertension, prescribed 0.2 mg of clonidine daily, Norvasc 5 mg daily and ramipril 5 mg daily.  Blood pressure is stable.      Objective   Vital Signs:  /80 (BP Location: Left arm, Patient Position: Sitting, Cuff Size: Adult)   Pulse 79   Ht 165.1 cm (65\")   Wt 81.6 kg (180 lb)   SpO2 95%   BMI 29.95 kg/m²   Estimated body mass index is 29.95 kg/m² as calculated from the following:    Height as of this encounter: 165.1 cm (65\").    Weight as of this encounter: 81.6 kg (180 lb).             Physical Exam  Constitutional:       Appearance: Normal appearance.   HENT:      Head: Normocephalic.   Cardiovascular:      Rate and Rhythm: Normal rate and regular rhythm.   Pulmonary:      Effort: Pulmonary effort is normal.      Breath sounds: Examination of the right-lower field reveals wheezing. Examination of the left-lower field reveals wheezing and rhonchi. Wheezing and rhonchi present.   Abdominal:      General: Abdomen is flat. Bowel sounds are normal.      Palpations: Abdomen is soft.   Musculoskeletal:         General: Normal range of motion.      Cervical back: Neck supple.      Right lower leg: No edema.      Left " lower leg: No edema.   Skin:     General: Skin is warm and dry.   Neurological:      Mental Status: She is alert and oriented to person, place, and time.      Gait: Gait is intact.   Psychiatric:         Attention and Perception: Attention normal.         Mood and Affect: Mood normal.         Speech: Speech normal.        Result Review :    CMP    CMP 3/31/22 4/13/22 6/2/22   Glucose 93 84 103 (A)   BUN 15 16 16   Creatinine 0.90 0.94 1.00   eGFR 68.1 64.6 60.0 (A)   Sodium 142 141 138   Potassium 4.4 4.2 4.3   Chloride 103 103 100   Calcium 9.4 9.1 9.0   Total Protein   6.9   Albumin   4.60   Globulin   2.3   Total Bilirubin   0.4   Alkaline Phosphatase   89   AST (SGOT)   16   ALT (SGPT)   17   Albumin/Globulin Ratio   2.0   BUN/Creatinine Ratio 16.7 17.0 16.0   Anion Gap 9.7 13.0 11.9   (A) Abnormal value       Comments are available for some flowsheets but are not being displayed.           CBC    CBC 3/31/22 4/13/22 6/2/22   WBC 6.77 7.20 8.61   RBC 3.80 3.91 4.15   Hemoglobin 11.2 (A) 11.6 (A) 12.0   Hematocrit 34.1 34.6 36.1   MCV 89.7 88.5 87.0   MCH 29.5 29.7 28.9   MCHC 32.8 33.5 33.2   RDW 12.3 13.3 12.8   Platelets 318 317 271   (A) Abnormal value            Lipid Panel    Lipid Panel 6/2/22   Total Cholesterol 187   Triglycerides 109   HDL Cholesterol 106 (A)   VLDL Cholesterol 18   LDL Cholesterol  63   LDL/HDL Ratio 0.56   (A) Abnormal value            TSH    TSH 8/15/22 11/15/22 2/13/23   TSH 0.193 (A) 0.015 (A) 0.118 (A)   (A) Abnormal value            Most Recent A1C    HGBA1C Most Recent 11/15/22   Hemoglobin A1C 6.8 (A)   (A) Abnormal value            Data reviewed: Consultant notes Endocrinology             Assessment and Plan   Diagnoses and all orders for this visit:    1. Acquired hypothyroidism (Primary)  Assessment & Plan:  1.  Continue Synthroid as prescribed per endocrinology        2. Type 2 diabetes mellitus with other specified complication, without long-term current use of insulin  ([facility])  Assessment & Plan:  1.  Continue metformin as prescribed  2.  ADA diet  3.  A1c today    Orders:  -     Hemoglobin A1c    3. Primary hypertension  Assessment & Plan:  1.  Continue medications as prescribed      4. Bronchitis  Assessment & Plan:  1.  Medrol Dosepak  2.  Cefdinir 300 mg twice daily x7 days  3.  Albuterol as needed for dyspnea or wheeze  4.  Tessalon Perles as needed for cough  5.  Call if symptoms persist or worsen      5. Moderate episode of recurrent major depressive disorder (HCC)  Assessment & Plan:  1.  Symptoms stable on Wellbutrin and Effexor      6. Anxiety    Other orders  -     levothyroxine (Synthroid) 125 MCG tablet; Take 1 tablet by mouth Daily.  Dispense: 30 tablet; Refill: 1  -     methylPREDNISolone (MEDROL) 4 MG dose pack; Take as directed on package instructions.  Dispense: 21 tablet; Refill: 0  -     cefdinir (OMNICEF) 300 MG capsule; Take 1 capsule by mouth 2 (Two) Times a Day.  Dispense: 14 capsule; Refill: 0  -     albuterol sulfate  (90 Base) MCG/ACT inhaler; Inhale 2 puffs Every 4 (Four) Hours As Needed for Wheezing or Shortness of Air.  Dispense: 8 g; Refill: 0  -     benzonatate (Tessalon Perles) 100 MG capsule; Take 1 capsule by mouth 3 (Three) Times a Day As Needed for Cough.  Dispense: 30 capsule; Refill: 0         I spent 30 minutes caring for Radha on this date of service. This time includes time spent by me in the following activities:preparing for the visit, reviewing tests, obtaining and/or reviewing a separately obtained history, performing a medically appropriate examination and/or evaluation , counseling and educating the patient/family/caregiver, ordering medications, tests, or procedures, documenting information in the medical record, independently interpreting results and communicating that information with the patient/family/caregiver and care coordination  Follow Up   Return in about 4 months (around 6/15/2023) for DMII.  Patient was given  instructions and counseling regarding her condition or for health maintenance advice. Please see specific information pulled into the AVS if appropriate.

## 2023-02-28 RX ORDER — ROPINIROLE 0.5 MG/1
0.5 TABLET, FILM COATED ORAL
Qty: 90 TABLET | Refills: 1 | Status: SHIPPED | OUTPATIENT
Start: 2023-02-28

## 2023-02-28 NOTE — TELEPHONE ENCOUNTER
Caller: [facility] - [place], [place] - [address] - [phone] [facility] [phone] FX    Relationship: Pharmacy    Best call back number: [phone]    Requested Prescriptions:   Requested Prescriptions     Pending Prescriptions Disp Refills   • rOPINIRole (REQUIP) 0.5 MG tablet 90 tablet 1     Sig: Take 1 tablet by mouth every night at bedtime.        Pharmacy where request should be sent: [facility] - [place], [place] - [address] - [phone] [facility] [phone] FX     Additional details provided by patient: 90 DAY SUPPLY REQUESTED     Does the patient have less than a 3 day supply:  [x] Yes  [] No    Would you like a call back once the refill request has been completed: [] Yes [x] No    If the office needs to give you a call back, can they leave a voicemail: [] Yes [] No    Khalif Jacobs   02/28/23 09:30 EST

## 2023-03-02 DIAGNOSIS — R41.89 COGNITIVE IMPAIRMENT: ICD-10-CM

## 2023-03-03 RX ORDER — MECLIZINE HYDROCHLORIDE 25 MG/1
TABLET ORAL
Qty: 180 TABLET | Refills: 0 | Status: SHIPPED | OUTPATIENT
Start: 2023-03-03

## 2023-03-03 RX ORDER — PRAVASTATIN SODIUM 20 MG
TABLET ORAL
Qty: 90 TABLET | Refills: 1 | Status: SHIPPED | OUTPATIENT
Start: 2023-03-03

## 2023-03-03 RX ORDER — DONEPEZIL HYDROCHLORIDE 5 MG/1
TABLET, FILM COATED ORAL
Qty: 60 TABLET | Refills: 2 | Status: SHIPPED | OUTPATIENT
Start: 2023-03-03

## 2023-04-03 RX ORDER — LEVOTHYROXINE SODIUM 0.12 MG/1
TABLET ORAL
Qty: 60 TABLET | Refills: 1 | Status: SHIPPED | OUTPATIENT
Start: 2023-04-03

## 2023-04-07 ENCOUNTER — DOCUMENTATION (OUTPATIENT)
Dept: PHYSICAL THERAPY | Facility: CLINIC | Age: 73
End: 2023-04-07
Payer: MEDICARE

## 2023-04-07 NOTE — PROGRESS NOTES
Discharge Summary  Discharge Summary from Physical Therapy Report      Dates  PT visit: 8/19/22  Number of Visits: 1     Goals: Not Met    Discharge Plan: Patient to return to referring/providing physician    Comments : Pt did not return after eval, discharge appropriate. Pt not present for discharge, therefore no functional measures taken. See last note for most updated information.      Date of Discharge 4/7/23      Renee Aparicio, PT  Physical Therapist

## 2023-04-24 RX ORDER — MEMANTINE HYDROCHLORIDE 10 MG/1
TABLET ORAL
Qty: 180 TABLET | Refills: 1 | Status: SHIPPED | OUTPATIENT
Start: 2023-04-24

## 2023-04-24 RX ORDER — RAMIPRIL 2.5 MG/1
CAPSULE ORAL
Qty: 180 CAPSULE | Refills: 1 | Status: SHIPPED | OUTPATIENT
Start: 2023-04-24

## 2023-04-24 NOTE — TELEPHONE ENCOUNTER
Assumed care of pt @ 1623. Pt A&Ox4. Complaining of CP of a 4/10 in [facility]. Nitro and heparin gtts infusing. Declines pain medications. Pt prepped for cath and brought down to cath lab. Pt brought back up with DAVID bond.  Groin site clean dry intac Rx request

## 2023-05-10 RX ORDER — OMEPRAZOLE 40 MG/1
CAPSULE, DELAYED RELEASE ORAL
Qty: 90 CAPSULE | Refills: 1 | Status: SHIPPED | OUTPATIENT
Start: 2023-05-10

## 2023-05-10 RX ORDER — HYDROXYZINE HYDROCHLORIDE 10 MG/1
TABLET, FILM COATED ORAL
Qty: 90 TABLET | Refills: 1 | Status: SHIPPED | OUTPATIENT
Start: 2023-05-10

## 2023-05-10 RX ORDER — VENLAFAXINE HYDROCHLORIDE 150 MG/1
CAPSULE, EXTENDED RELEASE ORAL
Qty: 180 CAPSULE | Refills: 1 | Status: SHIPPED | OUTPATIENT
Start: 2023-05-10

## 2023-05-10 RX ORDER — GABAPENTIN 300 MG/1
CAPSULE ORAL
Qty: 270 CAPSULE | Refills: 0 | Status: SHIPPED | OUTPATIENT
Start: 2023-05-10

## 2023-05-16 ENCOUNTER — TELEPHONE (OUTPATIENT)
Dept: FAMILY MEDICINE CLINIC | Facility: CLINIC | Age: 73
End: 2023-05-16
Payer: MEDICARE

## 2023-05-16 NOTE — TELEPHONE ENCOUNTER
Thyroid panel looks like it needed to be rechecked which can be checked at her visit in June as long as patient is feeling well

## 2023-05-16 NOTE — TELEPHONE ENCOUNTER
Patient's daughter called in yesterday, stating her mother was inpatient at [facility] from 5/11/23-5/13/23. Stated she seemed to be doing better. Daughter was wondering if patient could come in for updated labs as discussed in DC summary (requested yesterday, received today and routed to provider), since hospital f/u appts are booked out. Please advise.

## 2023-06-15 ENCOUNTER — OFFICE VISIT (OUTPATIENT)
Dept: FAMILY MEDICINE CLINIC | Facility: CLINIC | Age: 73
End: 2023-06-15
Payer: MEDICARE

## 2023-06-15 VITALS
SYSTOLIC BLOOD PRESSURE: 128 MMHG | BODY MASS INDEX: 30.16 KG/M2 | OXYGEN SATURATION: 94 % | DIASTOLIC BLOOD PRESSURE: 70 MMHG | HEART RATE: 64 BPM | WEIGHT: 181 LBS | HEIGHT: 65 IN

## 2023-06-15 DIAGNOSIS — R29.6 FREQUENT FALLS: Primary | ICD-10-CM

## 2023-06-15 DIAGNOSIS — E11.69 TYPE 2 DIABETES MELLITUS WITH OTHER SPECIFIED COMPLICATION, WITHOUT LONG-TERM CURRENT USE OF INSULIN: ICD-10-CM

## 2023-06-15 DIAGNOSIS — J44.9 CHRONIC OBSTRUCTIVE PULMONARY DISEASE, UNSPECIFIED COPD TYPE: ICD-10-CM

## 2023-06-15 DIAGNOSIS — R41.89 COGNITIVE IMPAIRMENT: ICD-10-CM

## 2023-06-15 DIAGNOSIS — I10 PRIMARY HYPERTENSION: ICD-10-CM

## 2023-06-15 DIAGNOSIS — R42 DIZZINESS: ICD-10-CM

## 2023-06-15 DIAGNOSIS — N39.0 URINARY TRACT INFECTION WITHOUT HEMATURIA, SITE UNSPECIFIED: ICD-10-CM

## 2023-06-15 DIAGNOSIS — E03.9 ACQUIRED HYPOTHYROIDISM: ICD-10-CM

## 2023-06-15 PROCEDURE — 80053 COMPREHEN METABOLIC PANEL: CPT | Performed by: NURSE PRACTITIONER

## 2023-06-15 PROCEDURE — 84481 FREE ASSAY (FT-3): CPT | Performed by: NURSE PRACTITIONER

## 2023-06-15 PROCEDURE — 87186 SC STD MICRODIL/AGAR DIL: CPT | Performed by: NURSE PRACTITIONER

## 2023-06-15 PROCEDURE — 82043 UR ALBUMIN QUANTITATIVE: CPT | Performed by: NURSE PRACTITIONER

## 2023-06-15 PROCEDURE — 84443 ASSAY THYROID STIM HORMONE: CPT | Performed by: NURSE PRACTITIONER

## 2023-06-15 PROCEDURE — 81001 URINALYSIS AUTO W/SCOPE: CPT | Performed by: NURSE PRACTITIONER

## 2023-06-15 PROCEDURE — 87086 URINE CULTURE/COLONY COUNT: CPT | Performed by: NURSE PRACTITIONER

## 2023-06-15 PROCEDURE — 87077 CULTURE AEROBIC IDENTIFY: CPT | Performed by: NURSE PRACTITIONER

## 2023-06-15 PROCEDURE — 80061 LIPID PANEL: CPT | Performed by: NURSE PRACTITIONER

## 2023-06-15 PROCEDURE — 83036 HEMOGLOBIN GLYCOSYLATED A1C: CPT | Performed by: NURSE PRACTITIONER

## 2023-06-15 PROCEDURE — 85027 COMPLETE CBC AUTOMATED: CPT | Performed by: NURSE PRACTITIONER

## 2023-06-15 PROCEDURE — 84439 ASSAY OF FREE THYROXINE: CPT | Performed by: NURSE PRACTITIONER

## 2023-06-15 RX ORDER — MECLIZINE HYDROCHLORIDE 25 MG/1
TABLET ORAL
Qty: 180 TABLET | Refills: 0 | Status: SHIPPED | OUTPATIENT
Start: 2023-06-15

## 2023-06-15 RX ORDER — BUDESONIDE AND FORMOTEROL FUMARATE DIHYDRATE 160; 4.5 UG/1; UG/1
2 AEROSOL RESPIRATORY (INHALATION)
Qty: 1 EACH | Refills: 12 | Status: SHIPPED | OUTPATIENT
Start: 2023-06-15

## 2023-06-15 RX ORDER — LEVOTHYROXINE SODIUM 0.1 MG/1
100 TABLET ORAL DAILY
Qty: 90 TABLET | Refills: 0 | Status: SHIPPED | OUTPATIENT
Start: 2023-06-15

## 2023-06-15 NOTE — PROGRESS NOTES
Venipuncture Blood Specimen Collection  Venipuncture performed in the left arm by Sachi Velásquez MA with good hemostasis. Patient tolerated the procedure well without complications.   06/15/23   Sachi Velásquez MA

## 2023-06-15 NOTE — PROGRESS NOTES
"Chief Complaint  Follow-up (4 month follow up DM ), Fall (Has had 2 falls since being out of the hospital 4 weeks ago. Feels like she is off balance. This seems to be happening at night time. ), and Wheezing (Wheezing and feeling short of breath at times. Feels like this has been going on for several months. )    Subjective        Radha Barbour presents to [facility]  History of Present Illness    Patient presents for f/u visit s/p hospitalization at [facility] from 5/11-5/13. She was admitted with AMS - diagnosed with urosepsis. CT head showed non evidence of acute intracranial pathology. CXR showed no acute abnormality. Patient c/o dizziness with standing and feeling unbalanced with walking. She has had two falls since being out of the hospital. Patient has walker, not ambulating with it at home.     Patient has hypothyroidism, Synthroid dosing changed to 100 mcg daily during hospitalization.  She is tolerating well.    Patient has hypertension, prescribed 0.2 mg of clonidine daily, Norvasc 5 mg daily and ramipril 5 mg daily.  Blood pressure is stable.       Patient has type 2 diabetes, taking Metformin 1 g daily.  Last A1c was 6.8.    Patient reports diagnosis of probable COPD during hospitalization, started on Albuterol PRN dyspnea/wheezing. She reports intermittent cough, wheezing and exertional dyspnea.     Patient is having trouble falling and staying asleep.  She has tried and failed melatonin.  No known aggravating factors.    Patient followed by Neurology regarding cognitive impairment, MRI brain noted to be normal.  Patient is taking Aricept  10 mg daily and Namenda 10mg BID.     Objective   Vital Signs:  /70 (BP Location: Left arm, Patient Position: Sitting, Cuff Size: Adult)   Pulse 64   Ht 165.1 cm (65\")   Wt 82.1 kg (181 lb)   SpO2 94%   BMI 30.12 kg/m²   Estimated body mass index is 30.12 kg/m² as calculated from the following:    Height as " "of this encounter: 165.1 cm (65\").    Weight as of this encounter: 82.1 kg (181 lb).       BMI is >= 30 and <35. (Class 1 Obesity). The following options were offered after discussion;: exercise counseling/recommendations and nutrition counseling/recommendations      Physical Exam  Constitutional:       Appearance: Normal appearance.   HENT:      Head: Normocephalic.   Cardiovascular:      Rate and Rhythm: Normal rate and regular rhythm.   Pulmonary:      Effort: Pulmonary effort is normal.      Breath sounds: Normal breath sounds. Examination of the right-middle field reveals wheezing. Examination of the left-middle field reveals wheezing.   Abdominal:      General: Abdomen is flat. Bowel sounds are normal.      Palpations: Abdomen is soft.   Musculoskeletal:         General: Normal range of motion.      Cervical back: Neck supple.      Right lower leg: No edema.      Left lower leg: No edema.   Skin:     General: Skin is warm and dry.   Neurological:      Mental Status: She is alert and oriented to person, place, and time.      Gait: Gait is intact.   Psychiatric:         Attention and Perception: Attention normal.         Mood and Affect: Mood normal.         Speech: Speech normal.      Result Review :    CMP          6/15/2023    14:50   CMP   Glucose 161    BUN 18    Creatinine 0.93    EGFR 65.0    Sodium 143    Potassium 4.5    Chloride 106    Calcium 8.7    Total Protein 7.0    Albumin 4.3    Globulin 2.7    Total Bilirubin 0.2    Alkaline Phosphatase 74    AST (SGOT) 13    ALT (SGPT) 10    Albumin/Globulin Ratio 1.6    BUN/Creatinine Ratio 19.4    Anion Gap 9.0      CBC          6/15/2023    14:50   CBC   WBC 7.99    RBC 3.74    Hemoglobin 11.2    Hematocrit 33.7    MCV 90.1    MCH 29.9    MCHC 33.2    RDW 12.6    Platelets 272      Lipid Panel          6/15/2023    14:50   Lipid Panel   Total Cholesterol 182    Triglycerides 127    HDL Cholesterol 105    VLDL Cholesterol 21    LDL Cholesterol  56    LDL/HDL " Ratio 0.49      TSH          11/15/2022    13:51 2/13/2023    13:12 6/15/2023    14:50   TSH   TSH 0.015  0.118  0.826      Most Recent A1C          6/15/2023    14:50   HGBA1C Most Recent   Hemoglobin A1C 7.20      Data reviewed : Recent hospitalization notes [facility]             Assessment and Plan   Diagnoses and all orders for this visit:    1. Frequent falls (Primary)  -     Ambulatory Referral to Physical Therapy Evaluate and treat    2. Dizziness  Assessment & Plan:  Labs today  Refer to physical therapy    Orders:  -     Ambulatory Referral to Physical Therapy Evaluate and treat    3. Cognitive impairment  Assessment & Plan:  Continue medications as prescribed per neurology      4. Acquired hypothyroidism  Assessment & Plan:  Continue Synthroid 100 mcg daily  Labs today    Orders:  -     TSH  -     T4, Free  -     T3, Free    5. Type 2 diabetes mellitus with other specified complication, without long-term current use of insulin  Assessment & Plan:  Continue medications as prescribed  ADA diet  Labs today    Orders:  -     CBC (No Diff)  -     Comprehensive Metabolic Panel  -     Hemoglobin A1c  -     MicroAlbumin, Urine, Random - Urine, Clean Catch    6. Primary hypertension  Assessment & Plan:  Blood pressure at goal of less than 140/90  Continue medications as prescribed    Orders:  -     Lipid Panel    7. Urinary tract infection without hematuria, site unspecified  Assessment & Plan:  Hospitalization notes reviewed  Repeat UA today    Orders:  -     Urinalysis With Culture If Indicated - Urine, Clean Catch  -     Urine Culture - Urine, Urine, Clean Catch  -     Urinalysis, Microscopic Only - Urine, Clean Catch    8. Chronic obstructive pulmonary disease, unspecified COPD type  Assessment & Plan:  Hospitalization notes reviewed  Start Symbicort 2 puffs twice daily  Albuterol as needed for dyspnea or wheezing        Other orders  -     budesonide-formoterol (Symbicort) 160-4.5 MCG/ACT  inhaler; Inhale 2 puffs 2 (Two) Times a Day.  Dispense: 1 each; Refill: 12  -     levothyroxine (SYNTHROID, LEVOTHROID) 100 MCG tablet; Take 1 tablet by mouth Daily. Changed at [facility] 4 weeks ago  Dispense: 90 tablet; Refill: 0  -     traZODone (DESYREL) 50 MG tablet; Take 1 tablet by mouth At Night As Needed for Sleep.  Dispense: 30 tablet; Refill: 1           I spent 35 minutes caring for Radha on this date of service. This time includes time spent by me in the following activities:preparing for the visit, reviewing tests, obtaining and/or reviewing a separately obtained history, performing a medically appropriate examination and/or evaluation , counseling and educating the patient/family/caregiver, ordering medications, tests, or procedures, referring and communicating with other health care professionals , documenting information in the medical record, and care coordination  Follow Up   Return in about 3 months (around 9/15/2023) for DMII/Falls.  Patient was given instructions and counseling regarding her condition or for health maintenance advice. Please see specific information pulled into the AVS if appropriate.       Answers submitted by the patient for this visit:  Other (Submitted on 6/8/2023)  Please describe your symptoms.: Hospital follow up after sepsis and regular appointment  Have you had these symptoms before?: No  How long have you been having these symptoms?: Greater than 2 weeks  Please describe any probable cause for these symptoms. : UTI  Primary Reason for Visit (Submitted on 6/8/2023)  What is the primary reason for your visit?: Other

## 2023-06-16 PROBLEM — J44.9 CHRONIC OBSTRUCTIVE PULMONARY DISEASE: Status: ACTIVE | Noted: 2023-06-16

## 2023-06-16 PROBLEM — N39.0 URINARY TRACT INFECTION WITHOUT HEMATURIA: Status: ACTIVE | Noted: 2023-06-16

## 2023-06-16 LAB
ALBUMIN SERPL-MCNC: 4.3 G/DL (ref 3.5–5.2)
ALBUMIN UR-MCNC: 4 MG/DL
ALBUMIN/GLOB SERPL: 1.6 G/DL
ALP SERPL-CCNC: 74 U/L (ref 39–117)
ALT SERPL W P-5'-P-CCNC: 10 U/L (ref 1–33)
AMORPH URATE CRY URNS QL MICRO: ABNORMAL /HPF
ANION GAP SERPL CALCULATED.3IONS-SCNC: 9 MMOL/L (ref 5–15)
AST SERPL-CCNC: 13 U/L (ref 1–32)
BACTERIA UR QL AUTO: ABNORMAL /HPF
BILIRUB SERPL-MCNC: 0.2 MG/DL (ref 0–1.2)
BILIRUB UR QL STRIP: NEGATIVE
BUN SERPL-MCNC: 18 MG/DL (ref 8–23)
BUN/CREAT SERPL: 19.4 (ref 7–25)
CALCIUM SPEC-SCNC: 8.7 MG/DL (ref 8.6–10.5)
CHLORIDE SERPL-SCNC: 106 MMOL/L (ref 98–107)
CHOLEST SERPL-MCNC: 182 MG/DL (ref 0–200)
CLARITY UR: ABNORMAL
CO2 SERPL-SCNC: 28 MMOL/L (ref 22–29)
COLOR UR: YELLOW
CREAT SERPL-MCNC: 0.93 MG/DL (ref 0.57–1)
DEPRECATED RDW RBC AUTO: 40.9 FL (ref 37–54)
EGFRCR SERPLBLD CKD-EPI 2021: 65 ML/MIN/1.73
ERYTHROCYTE [DISTWIDTH] IN BLOOD BY AUTOMATED COUNT: 12.6 % (ref 12.3–15.4)
GLOBULIN UR ELPH-MCNC: 2.7 GM/DL
GLUCOSE SERPL-MCNC: 161 MG/DL (ref 65–99)
GLUCOSE UR STRIP-MCNC: NEGATIVE MG/DL
HBA1C MFR BLD: 7.2 % (ref 4.8–5.6)
HCT VFR BLD AUTO: 33.7 % (ref 34–46.6)
HDLC SERPL-MCNC: 105 MG/DL (ref 40–60)
HGB BLD-MCNC: 11.2 G/DL (ref 12–15.9)
HGB UR QL STRIP.AUTO: ABNORMAL
HYALINE CASTS UR QL AUTO: ABNORMAL /LPF
KETONES UR QL STRIP: NEGATIVE
LDLC SERPL CALC-MCNC: 56 MG/DL (ref 0–100)
LDLC/HDLC SERPL: 0.49 {RATIO}
LEUKOCYTE ESTERASE UR QL STRIP.AUTO: ABNORMAL
MCH RBC QN AUTO: 29.9 PG (ref 26.6–33)
MCHC RBC AUTO-ENTMCNC: 33.2 G/DL (ref 31.5–35.7)
MCV RBC AUTO: 90.1 FL (ref 79–97)
NITRITE UR QL STRIP: POSITIVE
PH UR STRIP.AUTO: 5.5 [PH] (ref 5–8)
PLATELET # BLD AUTO: 272 10*3/MM3 (ref 140–450)
PMV BLD AUTO: 9.9 FL (ref 6–12)
POTASSIUM SERPL-SCNC: 4.5 MMOL/L (ref 3.5–5.2)
PROT SERPL-MCNC: 7 G/DL (ref 6–8.5)
PROT UR QL STRIP: ABNORMAL
RBC # BLD AUTO: 3.74 10*6/MM3 (ref 3.77–5.28)
RBC # UR STRIP: ABNORMAL /HPF
REF LAB TEST METHOD: ABNORMAL
SODIUM SERPL-SCNC: 143 MMOL/L (ref 136–145)
SP GR UR STRIP: 1.02 (ref 1–1.03)
SQUAMOUS #/AREA URNS HPF: ABNORMAL /HPF
T3FREE SERPL-MCNC: 2.29 PG/ML (ref 2–4.4)
T4 FREE SERPL-MCNC: 1.28 NG/DL (ref 0.93–1.7)
TRIGL SERPL-MCNC: 127 MG/DL (ref 0–150)
TSH SERPL DL<=0.05 MIU/L-ACNC: 0.83 UIU/ML (ref 0.27–4.2)
UROBILINOGEN UR QL STRIP: ABNORMAL
VLDLC SERPL-MCNC: 21 MG/DL (ref 5–40)
WBC # UR STRIP: ABNORMAL /HPF
WBC NRBC COR # BLD: 7.99 10*3/MM3 (ref 3.4–10.8)
YEAST URNS QL MICRO: ABNORMAL /HPF

## 2023-06-16 RX ORDER — LEVOFLOXACIN 500 MG/1
500 TABLET, FILM COATED ORAL DAILY
Qty: 7 TABLET | Refills: 0 | Status: SHIPPED | OUTPATIENT
Start: 2023-06-16

## 2023-06-16 RX ORDER — TRAZODONE HYDROCHLORIDE 50 MG/1
50 TABLET ORAL NIGHTLY PRN
Qty: 30 TABLET | Refills: 1 | Status: SHIPPED | OUTPATIENT
Start: 2023-06-16

## 2023-06-16 NOTE — PROGRESS NOTES
Please let patient know that it does appear that she still has a urinary infection so I am going to send antibiotics to the pharmacy.  A1c has increased to 7.2 which is slightly above goal.  Continue medications as prescribed and work on limiting concentrated sweets.  Thyroid panel is good as are the rest of her labs, continue medications as prescribed

## 2023-06-16 NOTE — ASSESSMENT & PLAN NOTE
Hospitalization notes reviewed  Start Symbicort 2 puffs twice daily  Albuterol as needed for dyspnea or wheezing

## 2023-06-17 LAB — BACTERIA SPEC AEROBE CULT: ABNORMAL

## 2023-06-19 NOTE — PROGRESS NOTES
Please let patient know that she does have a UTI, Levaquin is susceptible so she should finish all medication.  Call with any persisting urinary symptoms or other concerns.  Due to recent treatment, it may be a good idea to repeat a UA in 2 weeks

## 2023-07-05 ENCOUNTER — TELEPHONE (OUTPATIENT)
Dept: PHYSICAL THERAPY | Facility: CLINIC | Age: 73
End: 2023-07-05

## 2023-07-22 DIAGNOSIS — R41.89 COGNITIVE IMPAIRMENT: ICD-10-CM

## 2023-07-24 RX ORDER — DONEPEZIL HYDROCHLORIDE 5 MG/1
TABLET, FILM COATED ORAL
Qty: 90 TABLET | Refills: 0 | Status: SHIPPED | OUTPATIENT
Start: 2023-07-24

## 2023-07-24 RX ORDER — ROPINIROLE 0.5 MG/1
TABLET, FILM COATED ORAL
Qty: 90 TABLET | Refills: 1 | Status: SHIPPED | OUTPATIENT
Start: 2023-07-24

## 2023-08-21 NOTE — PROGRESS NOTES
Venipuncture Blood Specimen Collection  Venipuncture performed in the left arm by Sachi Velásquez MA with good hemostasis. Patient tolerated the procedure well without complications.   11/15/22   Sachi Velásquez MA   Patient noted to have a wound on the plantar aspect of base of first toe  Wound care evaluated the patient and wound is probing to the bone  Rule out osteomyelitis  Podiatry was consulted  We will check the right foot x-ray  Hold off on antibiotics at the current time

## 2023-08-28 RX ORDER — LEVOTHYROXINE SODIUM 0.12 MG/1
TABLET ORAL
Qty: 90 TABLET | Refills: 1 | Status: SHIPPED | OUTPATIENT
Start: 2023-08-28

## 2023-09-12 RX ORDER — LEVOTHYROXINE SODIUM 0.1 MG/1
TABLET ORAL
Qty: 90 TABLET | Refills: 0 | Status: SHIPPED | OUTPATIENT
Start: 2023-09-12

## 2023-09-19 ENCOUNTER — OFFICE VISIT (OUTPATIENT)
Dept: FAMILY MEDICINE CLINIC | Facility: CLINIC | Age: 73
End: 2023-09-19
Payer: MEDICARE

## 2023-09-19 VITALS
BODY MASS INDEX: 29.66 KG/M2 | SYSTOLIC BLOOD PRESSURE: 148 MMHG | HEART RATE: 74 BPM | WEIGHT: 178 LBS | OXYGEN SATURATION: 93 % | DIASTOLIC BLOOD PRESSURE: 80 MMHG | HEIGHT: 65 IN

## 2023-09-19 DIAGNOSIS — R41.89 COGNITIVE IMPAIRMENT: ICD-10-CM

## 2023-09-19 DIAGNOSIS — E03.9 ACQUIRED HYPOTHYROIDISM: ICD-10-CM

## 2023-09-19 DIAGNOSIS — R42 DIZZINESS: Primary | ICD-10-CM

## 2023-09-19 DIAGNOSIS — N39.0 URINARY TRACT INFECTION WITHOUT HEMATURIA, SITE UNSPECIFIED: ICD-10-CM

## 2023-09-19 DIAGNOSIS — Z23 NEED FOR INFLUENZA VACCINATION: ICD-10-CM

## 2023-09-19 DIAGNOSIS — J44.9 CHRONIC OBSTRUCTIVE PULMONARY DISEASE, UNSPECIFIED COPD TYPE: ICD-10-CM

## 2023-09-19 DIAGNOSIS — F33.1 MODERATE EPISODE OF RECURRENT MAJOR DEPRESSIVE DISORDER: ICD-10-CM

## 2023-09-19 DIAGNOSIS — E11.69 TYPE 2 DIABETES MELLITUS WITH OTHER SPECIFIED COMPLICATION, WITHOUT LONG-TERM CURRENT USE OF INSULIN: ICD-10-CM

## 2023-09-19 DIAGNOSIS — F41.9 ANXIETY: ICD-10-CM

## 2023-09-19 DIAGNOSIS — R29.6 FREQUENT FALLS: ICD-10-CM

## 2023-09-19 DIAGNOSIS — I10 PRIMARY HYPERTENSION: ICD-10-CM

## 2023-09-19 PROCEDURE — 1160F RVW MEDS BY RX/DR IN RCRD: CPT | Performed by: NURSE PRACTITIONER

## 2023-09-19 PROCEDURE — 83036 HEMOGLOBIN GLYCOSYLATED A1C: CPT | Performed by: NURSE PRACTITIONER

## 2023-09-19 PROCEDURE — 3051F HG A1C>EQUAL 7.0%<8.0%: CPT | Performed by: NURSE PRACTITIONER

## 2023-09-19 PROCEDURE — 36415 COLL VENOUS BLD VENIPUNCTURE: CPT | Performed by: NURSE PRACTITIONER

## 2023-09-19 PROCEDURE — 3077F SYST BP >= 140 MM HG: CPT | Performed by: NURSE PRACTITIONER

## 2023-09-19 PROCEDURE — 3079F DIAST BP 80-89 MM HG: CPT | Performed by: NURSE PRACTITIONER

## 2023-09-19 PROCEDURE — 90662 IIV NO PRSV INCREASED AG IM: CPT | Performed by: NURSE PRACTITIONER

## 2023-09-19 PROCEDURE — 84443 ASSAY THYROID STIM HORMONE: CPT | Performed by: NURSE PRACTITIONER

## 2023-09-19 PROCEDURE — G0008 ADMIN INFLUENZA VIRUS VAC: HCPCS | Performed by: NURSE PRACTITIONER

## 2023-09-19 PROCEDURE — 85027 COMPLETE CBC AUTOMATED: CPT | Performed by: NURSE PRACTITIONER

## 2023-09-19 PROCEDURE — 99214 OFFICE O/P EST MOD 30 MIN: CPT | Performed by: NURSE PRACTITIONER

## 2023-09-19 PROCEDURE — 84481 FREE ASSAY (FT-3): CPT | Performed by: NURSE PRACTITIONER

## 2023-09-19 PROCEDURE — 84439 ASSAY OF FREE THYROXINE: CPT | Performed by: NURSE PRACTITIONER

## 2023-09-19 PROCEDURE — 80053 COMPREHEN METABOLIC PANEL: CPT | Performed by: NURSE PRACTITIONER

## 2023-09-19 PROCEDURE — 1159F MED LIST DOCD IN RCRD: CPT | Performed by: NURSE PRACTITIONER

## 2023-09-19 RX ORDER — CLONIDINE HYDROCHLORIDE 0.2 MG/1
0.2 TABLET ORAL DAILY
Qty: 90 TABLET | Refills: 1 | Status: SHIPPED | OUTPATIENT
Start: 2023-09-19

## 2023-09-19 RX ORDER — LEVOTHYROXINE SODIUM 0.1 MG/1
100 TABLET ORAL DAILY
Qty: 90 TABLET | Refills: 1 | Status: SHIPPED | OUTPATIENT
Start: 2023-09-19

## 2023-09-19 NOTE — PROGRESS NOTES
Venipuncture Blood Specimen Collection  Venipuncture performed in the left arm by Sachi Velásquez MA with good hemostasis. Patient tolerated the procedure well without complications.   09/19/23   Sachi Velásquez MA

## 2023-09-19 NOTE — ASSESSMENT & PLAN NOTE
Instructions on how to perform Epley maneuver provided  If fails, refer to ENT  Continue meclizine as needed

## 2023-09-19 NOTE — ASSESSMENT & PLAN NOTE
Recent clonidine to pharmacy, advised patient to restart and take as prescribed  Continue Norvasc and ramipril as prescribed

## 2023-09-19 NOTE — ASSESSMENT & PLAN NOTE
Discussed with patient that she needs to be taking Symbicort 2 puffs twice daily routine  Albuterol is to be used as needed

## 2023-09-19 NOTE — PROGRESS NOTES
"Chief Complaint  Follow-up (3 month follow up DM/falls )    Subjective        Radha Barbour presents to [facility]  History of Present Illness    Patient presents for follow-up visit.    Patient seen in June following hospitalization she was admitted for urosepsis.  Patient was referred to physical therapy due to recurrent falls. She describes a room spinning sensation - referred for PT and completed at least 10 sessions. Patient and granddaughter reports it seemed to make dizziness worse. She does have Meclizine to use PRN.     Patient has hypertension, prescribed 0.2 mg of clonidine daily (out of medication for a few weeks), Norvasc 5 mg daily and ramipril 5 mg daily.   SBP slightly elevated.  Patient denies headaches, vision changes, chest pain or dyspnea.    Patient has type 2 diabetes, taking Metformin 1 g daily.  Last A1c was 7.2. Eye exam UTD.  She does not follow strict ADA diet.     Patient followed by Neurology regarding cognitive impairment, MRI brain noted to be normal.  Patient is taking Aricept  10 mg daily and Namenda 10mg BID.     Patient started on Symbicort 2 puffs twice daily for COPD - only using PRN.  She is using as prescribed and albuterol as needed for dyspnea or wheezing.    Patient has hypothyroidism, Synthroid dosing changed to 100 mcg daily during hospitalization.  She reports she ran out of medication, restarted 125 mcg daily a few weeks ago.    Patient is taking Wellbutrin 150 mg daily and Effexor 150 mg daily for depression and anxiety, symptoms are stable.     Objective   Vital Signs:  /80 (BP Location: Left arm, Patient Position: Sitting, Cuff Size: Adult)   Pulse 74   Ht 165.1 cm (65\")   Wt 80.7 kg (178 lb)   SpO2 93%   BMI 29.62 kg/m²   Estimated body mass index is 29.62 kg/m² as calculated from the following:    Height as of this encounter: 165.1 cm (65\").    Weight as of this encounter: 80.7 kg (178 lb).          Physical " Exam  Constitutional:       Appearance: Normal appearance.   HENT:      Head: Normocephalic.   Cardiovascular:      Rate and Rhythm: Normal rate and regular rhythm.   Pulmonary:      Effort: Pulmonary effort is normal.      Breath sounds: Normal breath sounds. Examination of the right-upper field reveals wheezing. Examination of the left-upper field reveals wheezing.   Abdominal:      General: Abdomen is flat. Bowel sounds are normal.      Palpations: Abdomen is soft.   Musculoskeletal:         General: Normal range of motion.      Cervical back: Neck supple.      Right lower leg: No edema.      Left lower leg: No edema.   Skin:     General: Skin is warm and dry.   Neurological:      Mental Status: She is alert and oriented to person, place, and time.      Gait: Gait is intact.   Psychiatric:         Attention and Perception: Attention normal.         Mood and Affect: Mood normal.         Speech: Speech normal.      Result Review :    CMP          6/15/2023    14:50   CMP   Glucose 161    BUN 18    Creatinine 0.93    EGFR 65.0    Sodium 143    Potassium 4.5    Chloride 106    Calcium 8.7    Total Protein 7.0    Albumin 4.3    Globulin 2.7    Total Bilirubin 0.2    Alkaline Phosphatase 74    AST (SGOT) 13    ALT (SGPT) 10    Albumin/Globulin Ratio 1.6    BUN/Creatinine Ratio 19.4    Anion Gap 9.0      CBC          6/15/2023    14:50   CBC   WBC 7.99    RBC 3.74    Hemoglobin 11.2    Hematocrit 33.7    MCV 90.1    MCH 29.9    MCHC 33.2    RDW 12.6    Platelets 272      Lipid Panel          6/15/2023    14:50   Lipid Panel   Total Cholesterol 182    Triglycerides 127    HDL Cholesterol 105    VLDL Cholesterol 21    LDL Cholesterol  56    LDL/HDL Ratio 0.49      TSH          11/15/2022    13:51 2/13/2023    13:12 6/15/2023    14:50   TSH   TSH 0.015  0.118  0.826      Most Recent A1C          6/15/2023    14:50   HGBA1C Most Recent   Hemoglobin A1C 7.20                   Assessment and Plan   Diagnoses and all orders  for this visit:    1. Dizziness (Primary)  Assessment & Plan:  Instructions on how to perform Epley maneuver provided  If fails, refer to ENT  Continue meclizine as needed      2. Frequent falls  Assessment & Plan:  Patient has had 2 falls since last visit -reports physical therapy seem to make her worse      3. Chronic obstructive pulmonary disease, unspecified COPD type  Assessment & Plan:  Discussed with patient that she needs to be taking Symbicort 2 puffs twice daily routine  Albuterol is to be used as needed        4. Cognitive impairment  Assessment & Plan:  Continue Aricept and Namenda as prescribed  Follow-up with neurology      5. Type 2 diabetes mellitus with other specified complication, without long-term current use of insulin  Assessment & Plan:  Labs today  ADA diet recommended  Continue metformin as prescribed  Eye exam up-to-date    Orders:  -     CBC (No Diff)  -     Comprehensive Metabolic Panel  -     Hemoglobin A1c    6. Acquired hypothyroidism  Assessment & Plan:  Advised patient restart 100 mcg of Synthroid daily  Labs today    Orders:  -     TSH  -     T4, Free  -     T3, Free    7. Urinary tract infection without hematuria, site unspecified  -     POC Urinalysis Dipstick    8. Need for influenza vaccination  -     Fluzone High-Dose 65+yrs ([id number])    9. Anxiety  Assessment & Plan:  Stable on Wellbutrin and Effexor      10. Moderate episode of recurrent major depressive disorder    11. Primary hypertension  Assessment & Plan:  Recent clonidine to pharmacy, advised patient to restart and take as prescribed  Continue Norvasc and ramipril as prescribed      Other orders  -     cloNIDine (CATAPRES) 0.2 MG tablet; Take 1 tablet by mouth Daily.  Dispense: 90 tablet; Refill: 1  -     levothyroxine (SYNTHROID, LEVOTHROID) 100 MCG tablet; Take 1 tablet by mouth Daily.  Dispense: 90 tablet; Refill: 1           I spent 30 minutes caring for Radha on this date of service. This time includes time  spent by me in the following activities:preparing for the visit, reviewing tests, obtaining and/or reviewing a separately obtained history, performing a medically appropriate examination and/or evaluation , counseling and educating the patient/family/caregiver, ordering medications, tests, or procedures, documenting information in the medical record, and care coordination  Follow Up   Return in about 3 months (around 12/19/2023) for DMII.  Patient was given instructions and counseling regarding her condition or for health maintenance advice. Please see specific information pulled into the AVS if appropriate.

## 2023-09-20 ENCOUNTER — CLINICAL SUPPORT (OUTPATIENT)
Dept: FAMILY MEDICINE CLINIC | Facility: CLINIC | Age: 73
End: 2023-09-20
Payer: MEDICARE

## 2023-09-20 DIAGNOSIS — R39.15 URGENCY OF MICTURITION: Primary | ICD-10-CM

## 2023-09-20 LAB
ALBUMIN SERPL-MCNC: 4.7 G/DL (ref 3.5–5.2)
ALBUMIN/GLOB SERPL: 1.6 G/DL
ALP SERPL-CCNC: 75 U/L (ref 39–117)
ALT SERPL W P-5'-P-CCNC: 8 U/L (ref 1–33)
ANION GAP SERPL CALCULATED.3IONS-SCNC: 12.5 MMOL/L (ref 5–15)
AST SERPL-CCNC: 13 U/L (ref 1–32)
BILIRUB BLD-MCNC: NEGATIVE MG/DL
BILIRUB SERPL-MCNC: 0.3 MG/DL (ref 0–1.2)
BUN SERPL-MCNC: 18 MG/DL (ref 8–23)
BUN/CREAT SERPL: 18.4 (ref 7–25)
CALCIUM SPEC-SCNC: 9.8 MG/DL (ref 8.6–10.5)
CHLORIDE SERPL-SCNC: 101 MMOL/L (ref 98–107)
CLARITY, POC: ABNORMAL
CO2 SERPL-SCNC: 28.5 MMOL/L (ref 22–29)
COLOR UR: YELLOW
CREAT SERPL-MCNC: 0.98 MG/DL (ref 0.57–1)
DEPRECATED RDW RBC AUTO: 39.5 FL (ref 37–54)
EGFRCR SERPLBLD CKD-EPI 2021: 61.1 ML/MIN/1.73
ERYTHROCYTE [DISTWIDTH] IN BLOOD BY AUTOMATED COUNT: 12.5 % (ref 12.3–15.4)
GLOBULIN UR ELPH-MCNC: 2.9 GM/DL
GLUCOSE SERPL-MCNC: 157 MG/DL (ref 65–99)
GLUCOSE UR STRIP-MCNC: NEGATIVE MG/DL
HBA1C MFR BLD: 7.1 % (ref 4.8–5.6)
HCT VFR BLD AUTO: 34.9 % (ref 34–46.6)
HGB BLD-MCNC: 11.6 G/DL (ref 12–15.9)
KETONES UR QL: NEGATIVE
LEUKOCYTE EST, POC: ABNORMAL
MCH RBC QN AUTO: 29.4 PG (ref 26.6–33)
MCHC RBC AUTO-ENTMCNC: 33.2 G/DL (ref 31.5–35.7)
MCV RBC AUTO: 88.4 FL (ref 79–97)
NITRITE UR-MCNC: NEGATIVE MG/ML
PH UR: 5 [PH] (ref 5–8)
PLATELET # BLD AUTO: 324 10*3/MM3 (ref 140–450)
PMV BLD AUTO: 9.9 FL (ref 6–12)
POTASSIUM SERPL-SCNC: 4.5 MMOL/L (ref 3.5–5.2)
PROT SERPL-MCNC: 7.6 G/DL (ref 6–8.5)
PROT UR STRIP-MCNC: NEGATIVE MG/DL
RBC # BLD AUTO: 3.95 10*6/MM3 (ref 3.77–5.28)
RBC # UR STRIP: ABNORMAL /UL
SODIUM SERPL-SCNC: 142 MMOL/L (ref 136–145)
SP GR UR: 1.02 (ref 1–1.03)
T3FREE SERPL-MCNC: 2.69 PG/ML (ref 2–4.4)
T4 FREE SERPL-MCNC: 1.57 NG/DL (ref 0.93–1.7)
TSH SERPL DL<=0.05 MIU/L-ACNC: 1.42 UIU/ML (ref 0.27–4.2)
UROBILINOGEN UR QL: NORMAL
WBC NRBC COR # BLD: 9.11 10*3/MM3 (ref 3.4–10.8)

## 2023-09-20 PROCEDURE — 87088 URINE BACTERIA CULTURE: CPT | Performed by: NURSE PRACTITIONER

## 2023-09-20 PROCEDURE — 87086 URINE CULTURE/COLONY COUNT: CPT | Performed by: NURSE PRACTITIONER

## 2023-09-20 PROCEDURE — 87186 SC STD MICRODIL/AGAR DIL: CPT

## 2023-09-20 PROCEDURE — 81001 URINALYSIS AUTO W/SCOPE: CPT | Performed by: NURSE PRACTITIONER

## 2023-09-20 NOTE — PROGRESS NOTES
Patient came in for a urine sample today for recurrent UTI symptoms. Urine was collected and results sent to provider for review.    MABEL Pinon

## 2023-09-21 ENCOUNTER — OFFICE VISIT (OUTPATIENT)
Dept: CARDIOLOGY | Facility: CLINIC | Age: 73
End: 2023-09-21
Payer: MEDICARE

## 2023-09-21 VITALS
BODY MASS INDEX: 29.49 KG/M2 | SYSTOLIC BLOOD PRESSURE: 159 MMHG | HEIGHT: 65 IN | WEIGHT: 177 LBS | OXYGEN SATURATION: 93 % | DIASTOLIC BLOOD PRESSURE: 70 MMHG | HEART RATE: 70 BPM

## 2023-09-21 DIAGNOSIS — I25.10 CORONARY ARTERY DISEASE INVOLVING NATIVE CORONARY ARTERY OF NATIVE HEART WITHOUT ANGINA PECTORIS: Primary | ICD-10-CM

## 2023-09-21 DIAGNOSIS — I10 HTN, GOAL BELOW 130/80: ICD-10-CM

## 2023-09-21 DIAGNOSIS — E78.5 DYSLIPIDEMIA: ICD-10-CM

## 2023-09-21 DIAGNOSIS — I10 PRIMARY HYPERTENSION: ICD-10-CM

## 2023-09-21 LAB
BACTERIA UR QL AUTO: ABNORMAL /HPF
BILIRUB UR QL STRIP: NEGATIVE
CLARITY UR: ABNORMAL
COLOR UR: YELLOW
GLUCOSE UR STRIP-MCNC: NEGATIVE MG/DL
HGB UR QL STRIP.AUTO: NEGATIVE
HOLD SPECIMEN: NORMAL
HYALINE CASTS UR QL AUTO: ABNORMAL /LPF
KETONES UR QL STRIP: NEGATIVE
LEUKOCYTE ESTERASE UR QL STRIP.AUTO: ABNORMAL
NITRITE UR QL STRIP: NEGATIVE
PH UR STRIP.AUTO: 5.5 [PH] (ref 5–8)
PROT UR QL STRIP: ABNORMAL
RBC # UR STRIP: ABNORMAL /HPF
REF LAB TEST METHOD: ABNORMAL
SP GR UR STRIP: 1.02 (ref 1–1.03)
SQUAMOUS #/AREA URNS HPF: ABNORMAL /HPF
UROBILINOGEN UR QL STRIP: ABNORMAL
WBC # UR STRIP: ABNORMAL /HPF

## 2023-09-21 PROCEDURE — 1160F RVW MEDS BY RX/DR IN RCRD: CPT | Performed by: INTERNAL MEDICINE

## 2023-09-21 PROCEDURE — 1159F MED LIST DOCD IN RCRD: CPT | Performed by: INTERNAL MEDICINE

## 2023-09-21 PROCEDURE — 99214 OFFICE O/P EST MOD 30 MIN: CPT | Performed by: INTERNAL MEDICINE

## 2023-09-21 PROCEDURE — 3077F SYST BP >= 140 MM HG: CPT | Performed by: INTERNAL MEDICINE

## 2023-09-21 PROCEDURE — 3078F DIAST BP <80 MM HG: CPT | Performed by: INTERNAL MEDICINE

## 2023-09-21 PROCEDURE — 93000 ELECTROCARDIOGRAM COMPLETE: CPT | Performed by: INTERNAL MEDICINE

## 2023-09-21 RX ORDER — AMLODIPINE BESYLATE 5 MG/1
5 TABLET ORAL DAILY
Qty: 90 TABLET | Refills: 3 | Status: SHIPPED | OUTPATIENT
Start: 2023-09-21

## 2023-09-21 RX ORDER — LEVOFLOXACIN 500 MG/1
500 TABLET, FILM COATED ORAL DAILY
Qty: 7 TABLET | Refills: 0 | Status: SHIPPED | OUTPATIENT
Start: 2023-09-21

## 2023-09-21 RX ORDER — BUDESONIDE AND FORMOTEROL FUMARATE DIHYDRATE 160; 4.5 UG/1; UG/1
2 AEROSOL RESPIRATORY (INHALATION)
Qty: 1 EACH | Refills: 12 | OUTPATIENT
Start: 2023-09-21

## 2023-09-21 NOTE — PROGRESS NOTES
Urine has reflexed to a culture.  Due to patient's previous hospitalization regarding urosepsis, I am going to go ahead and send in an antibiotic to get started for probable UTI.

## 2023-09-21 NOTE — PROGRESS NOTES
Cardiology Office Visit      Encounter Date:  09/21/2023    Patient ID:   Radha Barbour is a 72 y.o. female.    Chief Complaint   Patient presents with    Hypertension    Hyperlipidemia    Dizziness            Dear Michell Ramirez APRN           History of Present Illness   It was my pleasure to see Ms. Barbour in the office today.  She is a 72-year-old female with significant for history of hypertension, hyperlipidemia, hypothyroidism, history of diabetes.  She was seen in evaluation for symptoms of dizziness, fatigue, weakness, frequent multiple falls.  Conservative medical management was initiated with decreasing dose of Norvasc, initiation of MARGAUX hose and abdominal binder.  Full ischemic work-up was ordered including 2D echocardiogram that showed normal LV systolic function with EF 55%, mild pulmonary hypertension, moderate aortic valve stenosis.  Nuclear stress testing with moderate sized severe intensity reversible ischemia involving the anterior and anterior apical wall.  She was subsequently scheduled for heart catheterization that showed moderate obstructive disease involving mid LAD, mid RCA-plan to medically manage.  Mild obstructive disease in the distal circumflex.  She presents in follow-up from that procedure.     Today, Ms. Barbour reports that she does feel better.  She takes as needed meclizine for her dizziness which does help.  She denies any chest discomfort, shortness of breath, lower extremity edema.  Recent labs reviewed that showed elevated TSH at 34.8.  Lipid panel improved from prior with , LDL 63.  CBC and CMP unremarkable.     Heart cath 4/15/2022: Moderate obstructive coronary artery disease involving the mid LAD and mid right coronary artery plan to manage conservatively with medical therapy and continued aggressive risk factor modification  Mild obstructive coronary artery disease involving the distal circumflex artery  Normal LV systolic function estimate LV ejection  fraction of 50%  Normal left-sided filling pressures     No new cardiac symptoms  Have some intermittent vertigo     ASSESSMENT:  Dizziness  Orthostasis  Primary hypertension  Frequent falls  Diabetes mellitus 2  Hypothyroidism-uncontrolled  Coronary artery disease  Vertigo  Moderate obstructive coronary artery disease  Normal LV systolic function  Extensive cardiac work-up and evaluation  Moderate right carotid stenosis     PLAN:  Continue current medical therapy  Work-up and evaluation reviewed and discussed with patient and family  Lipids are optimal  Continue current medical therapy with ramipril 2.5 mg p.o. once a day Pravachol 20 mg p.o. once a day aspirin 81 mg p.o. once a day Norvasc 5 mg p.o. once a day  Avoid dehydration  Consider repositioning therapy with physical therapy for vertigo  Discussed with patient and family  Close monitoring of blood pressure at home  Recent labs and work-up and prior work-up reviewed and discussed with patient  Clinically acceptable little  higher blood pressures secondary to underlying severe orthostasis and also vertigo  Follow-up  in office in 1 year            Lab Results   Component Value Date    GLUCOSE 157 (H) 09/19/2023    BUN 18 09/19/2023    CREATININE 0.98 09/19/2023    EGFR 61.1 09/19/2023    BCR 18.4 09/19/2023    K 4.5 09/19/2023    CO2 28.5 09/19/2023    CALCIUM 9.8 09/19/2023    ALBUMIN 4.7 09/19/2023    BILITOT 0.3 09/19/2023    AST 13 09/19/2023    ALT 8 09/19/2023     Results for orders placed during the hospital encounter of 03/24/22    Adult Transthoracic Echo Complete W/ Cont if Necessary Per Protocol    Interpretation Summary  · Left ventricular wall thickness is consistent with mild asymmetric hypertrophy.  · Estimated left ventricular EF = 55% Estimated left ventricular EF was in agreement with the calculated left ventricular EF. Left ventricular systolic function is normal.  · Estimated right ventricular systolic pressure from tricuspid regurgitation  is mildly elevated (35-45 mmHg).  · Mild pulmonary hypertension is present.  · Moderate aortic valve stenosis is present.  · Left ventricular diastolic function is consistent with (grade I) impaired relaxation.     Results for orders placed during the hospital encounter of 04/15/22    Cardiac Catheterization/Vascular Study    Narrative  Table formatting from the original result was not included.  Cardiac Catheterization Operative Report    Radha Barbour  [id number]  4/15/2022  @PCP@    She underwent cardiac catheterization.    Indications for the procedure include: abnormal stress test.    Procedure Details:  The risks, benefits, complications, treatment options, and expected outcomes were discussed with the patient. The patient and/or family concurred with the proposed plan, giving informed consent.    After informed consent the patient was brought to the cath lab after appropriate IV hydration was begun and oral premedication was given. She was further sedated with fentanyl. She was prepped and draped in the usual manner. Using the modified Seldinger access technique, a 6 Croatian sheath was placed in the femoral artery. A left heart catheterization with coronary arteriography was performed. Findings are discussed below.    After the procedure was completed, sedation was stopped and the sheaths and catheters were all removed. Hemostasis was achieved per established hospital protocols.    Conscious sedation:  Conscious sedation was performed according to protocol.  I supervised and directed an independent trained observer with the assistance of monitoring the patient's level of consciousness and physiologic status throughout the procedure.  Intraoperative service time was 45 minutes.    Findings:    Hemodynamics Central aortic pressure systolic 156 and diastolic 60 with a mean pressure of 96 mmHg  LV end-diastolic pressure of 20 mmHg  There was no gradient across the aortic valve on the pullback of the pigtail  catheter  Left Main  left main is a large-caliber vessel angiographically normal bifurcates into left circumflex and LAD  RCA  large-caliber vessel angiographically normal mid focal area of 50% stenosis  Right coronary artery provides a moderate-sized PDA and PLB branches that are angiographically normal  LAD  large-caliber vessel mid focal area of 40 to 50% stenosis  Mid focal area of angiographic 40 to 50% stenosis at the origin of the septal branch  Ostium of the septal branch has angiographic 80 to 90% stenosis  Provides 1 large caliber diagonal branch that is angiographically normal  Rest of the LAD in mid to distal segment is angiographically normal  Circ  large-caliber vessel distal angiographic 20 to 30% stenosis  Provides 2 marginal branches first marginal branch is a small to moderate size vessel angiographically normal second marginal branch is a moderate large-caliber vessel angiographically normal  LV  normal LV systolic function normal wall motion estimate LV ejection fraction of 50%  Coronary Dominance  right coronary artery    Estimated Blood Loss:  Minimal    Specimens: None    Complications:  None; patient tolerated the procedure well.    Disposition: PACU - hemodynamically stable.    Condition: stable    Impressions:  Moderate obstructive coronary artery disease involving the mid LAD and mid right coronary artery plan to manage conservatively with medical therapy and continued aggressive risk factor modification  Mild obstructive coronary artery disease involving the distal circumflex artery  Normal LV systolic function estimate LV ejection fraction of 50%  Normal left-sided filling pressures    Recommendations:  Medical management for obstructive coronary artery disease  Test results reviewed and discussed with patient and family     Lab Results   Component Value Date    CHOL 182 06/15/2023    CHLPL 392 (H) 06/09/2020    TRIG 127 06/15/2023     (H) 06/15/2023    LDL 56 06/15/2023     "  Results for orders placed during the hospital encounter of 03/24/22    Stress Test With Myocardial Perfusion One Day    Interpretation Summary  · Abnormal myocardial perfusion study with moderate size severe intensity reversible ischemia involving the anterior and anteroapical wall  · Left ventricular ejection fraction is normal. (Calculated EF = 66%).  · Impressions are consistent with an intermediate risk study.            Objective:    Vitals:  Vitals:    09/21/23 1306   BP: 159/70   Pulse: 70   SpO2: 93%   Weight: 80.3 kg (177 lb)   Height: 165.1 cm (65\")       Physical Exam:    General: Alert, cooperative, no distress, appears stated age  Head:  Normocephalic, atraumatic, mucous membranes moist  Eyes:  Conjunctiva/corneas clear, EOM's intact     Neck:  Supple,  no adenopathy;      Lungs: Clear to auscultation bilaterally, no wheezes rhonchi rales are noted  Chest wall: No tenderness  Heart::  Regular rate and rhythm, S1 and S2 normal, no murmur, rub or gallop  Abdomen: Soft, non-tender, nondistended bowel sounds active  Extremities: No cyanosis, clubbing, or edema  Pulses: 2+ and symmetric all extremities  Skin:  No rashes or lesions  Neuro/psych: A&O x3. CN II through XII are grossly intact with appropriate affect      Allergies:  No Known Allergies    Medication Review:     Current Outpatient Medications:     albuterol sulfate  (90 Base) MCG/ACT inhaler, Inhale 2 puffs Every 4 (Four) Hours As Needed for Wheezing or Shortness of Air., Disp: 8 g, Rfl: 0    aspirin 81 MG EC tablet, Take 1 tablet by mouth Daily., Disp: , Rfl:     budesonide-formoterol (Symbicort) 160-4.5 MCG/ACT inhaler, Inhale 2 puffs 2 (Two) Times a Day., Disp: 1 each, Rfl: 12    buPROPion (WELLBUTRIN) 100 MG tablet, TAKE 1 TABLET TWICE DAILY, Disp: 180 tablet, Rfl: 1    CBD (cannabidiol) oral oil, Take 1 drop by mouth Daily As Needed., Disp: , Rfl:     cloNIDine (CATAPRES) 0.2 MG tablet, Take 1 tablet by mouth Daily., Disp: 90 tablet, " Rfl: 1    donepezil (ARICEPT) 5 MG tablet, TAKE 1 TABLET EVERY NIGHT, Disp: 90 tablet, Rfl: 0    gabapentin (NEURONTIN) 300 MG capsule, TAKE 1 CAPSULE THREE TIMES DAILY, Disp: 270 capsule, Rfl: 0    hydrOXYzine (ATARAX) 10 MG tablet, TAKE 1 TABLET EVERY DAY, Disp: 90 tablet, Rfl: 1    levoFLOXacin (Levaquin) 500 MG tablet, Take 1 tablet by mouth Daily., Disp: 7 tablet, Rfl: 0    levothyroxine (SYNTHROID, LEVOTHROID) 100 MCG tablet, Take 1 tablet by mouth Daily., Disp: 90 tablet, Rfl: 1    levothyroxine (SYNTHROID, LEVOTHROID) 125 MCG tablet, TAKE 1 TABLET EVERY DAY, Disp: 90 tablet, Rfl: 1    meclizine (ANTIVERT) 25 MG tablet, TAKE 1 TABLET THREE TIMES DAILY AS NEEDED FOR  DIZZINESS, Disp: 180 tablet, Rfl: 0    memantine (NAMENDA) 10 MG tablet, TAKE 1 TABLET TWICE DAILY, Disp: 180 tablet, Rfl: 1    metFORMIN (GLUCOPHAGE) 1000 MG tablet, TAKE 1 TABLET EVERY DAY WITH BREAKFAST, Disp: 90 tablet, Rfl: 1    omeprazole (priLOSEC) 40 MG capsule, TAKE 1 CAPSULE EVERY DAY, Disp: 90 capsule, Rfl: 1    pravastatin (PRAVACHOL) 20 MG tablet, TAKE 1 TABLET EVERY DAY, Disp: 90 tablet, Rfl: 1    ramipril (ALTACE) 2.5 MG capsule, TAKE 2 CAPSULES EVERY DAY, Disp: 180 capsule, Rfl: 1    rOPINIRole (REQUIP) 0.5 MG tablet, TAKE 1 TABLET AT BEDTIME, Disp: 90 tablet, Rfl: 1    traZODone (DESYREL) 50 MG tablet, Take 1 tablet by mouth At Night As Needed for Sleep., Disp: 30 tablet, Rfl: 1    venlafaxine XR (EFFEXOR-XR) 150 MG 24 hr capsule, TAKE 1 CAPSULE TWICE DAILY, Disp: 180 capsule, Rfl: 1    vitamin B-12 (CYANOCOBALAMIN) 1000 MCG tablet, Take 1 tablet by mouth Daily., Disp: , Rfl:     Vitamin D, Cholecalciferol, 50 MCG (2000 UT) capsule, Take 1 capsule by mouth Daily., Disp: , Rfl:     amLODIPine (NORVASC) 5 MG tablet, Take 1 tablet by mouth Daily., Disp: 90 tablet, Rfl: 3    Family History:  Family History   Problem Relation Age of Onset    Hypertension Mother     Arthritis Mother     Colon cancer Mother     Thyroid disease Mother      Stroke Mother     Cancer Mother         Colon    Dementia Mother     Heart disease Sister     Heart disease Sister     Dementia Maternal Aunt     Dementia Maternal Aunt        Past Medical History:  Past Medical History:   Diagnosis Date    Anxiety     Arthritis 1998    B12 deficiency 07/08/2016    Last Assessment & Plan:  Formatting of this note might be different from the original.  Compliant and controlled with current medication B 12 supplements . Lab today B 12    Back pain, chronic 07/08/2016    Last Assessment & Plan:  Formatting of this note might be different from the original. Will refer to PT    Cancer 2009    Colon    Colon polyp 2005    Colon Cancer    Dementia 2021    Doctor prescribed medicine for dementia/memory loss    Depression 07/08/2016    Last Assessment & Plan:  Formatting of this note might be different from the original.  Compliant and controlled with current medication    Diabetic peripheral neuropathy 02/14/2020    Last Assessment & Plan:  Formatting of this note might be different from the original. Stable    Difficulty walking 2022    Extreme unsteadiness on feet    DM type 2, goal HbA1c < 7% 03/27/2017    Last Assessment & Plan:  Formatting of this note might be different from the original.  Compliant and controlled with current diet.llabs today A1c    Dyslipidemia 10/11/2017    Last Assessment & Plan:  Formatting of this note might be different from the original.  Compliant and controlled with current medication/statin therapy/labs today    Environmental and seasonal allergies 10/11/2019    Gastric reflux 03/27/2017    HTN, goal below 130/80 02/10/2016    Last Assessment & Plan:  Formatting of this note might be different from the original. Compliant and controlled with current medication ramipril /labs today cmp to check renal function and electrolytes    Hyperlipidemia     Hypothyroidism 1990    Last Assessment & Plan:  Formatting of this note might be different from the original.  TSH low reduce levothyroxine.  Will check TSH today.    Insomnia 2015    Lumbosacral radiculopathy 2020    Last Assessment & Plan:  Formatting of this note might be different from the original.  Compliant and controlled with current medication Muscle relaxer    Memory loss     Osteoporosis 2020    Formatting of this note is different from the original. RADIOLOGY REPORT   FACILITY:  [facility] UN[facility]/AGE/GENDER: [id number]  OP      AGE:69 Y          SEX:F PATIENT NAME/:  GM DERAS    1950 UNIT NUMBER:  [id number] ACCOUNT NUMBER:  [id number] ACCESSION NUMBER:  [id number]     EXAMINATION: Bone densitometry of multiple sites, lumbar spine, left hip and distal    Restless leg syndrome 2018    Last Assessment & Plan:  Formatting of this note might be different from the original.  Compliant and controlled with current medication requip    Shortness of breath        Past surgical History:  Past Surgical History:   Procedure Laterality Date    BACK SURGERY  2007    lower    CARDIAC CATHETERIZATION Right 04/15/2022    Procedure: Left Heart Cath;  Surgeon: Laila Alvarez MD;  Location: [facility] CATH INVASIVE LOC[facility];  Service: Cardiovascular;  Laterality: Right;    CARDIAC CATHETERIZATION  4/15/2022    COLON SURGERY  2006    REPLACEMENT TOTAL KNEE Left     redone in 2010    REPLACEMENT TOTAL KNEE Right        Social History:  Social History     Socioeconomic History    Marital status:    Tobacco Use    Smoking status: Former     Packs/day: 2.00     Years: 20.00     Pack years: 40.00     Types: Cigarettes     Quit date: 1998     Years since quittin.7    Smokeless tobacco: Never   Vaping Use    Vaping Use: Never used   Substance and Sexual Activity    Alcohol use: Yes     Comment: socially    Drug use: Never     Comment: CBD oils    Sexual activity: Not Currently     Partners: Male     Birth control/protection: Post-menopausal, None        Review of Systems:  The following systems were reviewed as they relate to the cardiovascular system: Constitutional, Eyes, ENT, Cardiovascular, Respiratory, Gastrointestinal, Integumentary, Neurological, Psychiatric, Hematologic, Endocrine, Musculoskeletal, and Genitourinary. The pertinent cardiovascular findings are reported above with all other cardiovascular points within those systems being negative.    Diagnostic Study Review:     Current Electrocardiogram:    ECG 12 Lead    Date/Time: 9/21/2023 2:00 PM  Performed by: Laila Alvarez MD  Authorized by: Laila Alvarez MD   Comparison: compared with previous ECG   Similar to previous ECG  Rhythm: sinus rhythm  Rate: normal  BPM: 70  Conduction: conduction normal  QRS axis: normal  Other findings: non-specific ST-T wave changes    Clinical impression: abnormal EKG          Advance Care Planning   ACP discussion was held with the patient during this visit. Patient has an advance directive in EMR which is still valid.         NOTE: The following portions of the patient's history were reviewed and updated this visit as appropriate: allergies, current medications, past family history, past medical history, past social history, past surgical history and problem list.

## 2023-09-23 LAB — BACTERIA SPEC AEROBE CULT: ABNORMAL

## 2023-09-26 ENCOUNTER — TELEPHONE (OUTPATIENT)
Dept: FAMILY MEDICINE CLINIC | Facility: CLINIC | Age: 73
End: 2023-09-26

## 2023-09-26 NOTE — TELEPHONE ENCOUNTER
Hub staff attempted to follow warm transfer process and was unsuccessful     Caller: KARO JEFF    Relationship to patient: Emergency Contact    Best call back number:     KARO JEFF () [phone] (Mobile)       Patient is needing:   RETURNING CALL FROM OFFICE/ REGARDING LABS

## 2023-10-23 ENCOUNTER — DOCUMENTATION (OUTPATIENT)
Dept: PHYSICAL THERAPY | Facility: CLINIC | Age: 73
End: 2023-10-23
Payer: MEDICARE

## 2023-10-24 NOTE — PROGRESS NOTES
Physical Therapy Discharge Summary  [address] [address] [address]  [place] [address]    Patient:Radha Barbour  : 1950      Dates  PT visit: 23-23  Number of Visits: 4      Discharge Status of Patient: pt was waiting to D to see if she still needed to come to PT. Pt never returned to PT and POC has .      Goals: Goal status is undetermined as pt never returned to PT after the 4th visit.     Discharge Plan: Continue with current home exercise program as instructed     Comments: given HEP during sessions.     Date of Discharge: 10/23/23            Mira Aparicio, PT  Physical Therapist  [place] License: [id number]

## 2023-12-08 RX ORDER — HYDROXYZINE HYDROCHLORIDE 10 MG/1
10 TABLET, FILM COATED ORAL DAILY
Qty: 90 TABLET | Refills: 1 | Status: SHIPPED | OUTPATIENT
Start: 2023-12-08

## 2023-12-08 RX ORDER — MEMANTINE HYDROCHLORIDE 10 MG/1
10 TABLET ORAL 2 TIMES DAILY
Qty: 180 TABLET | Refills: 1 | Status: SHIPPED | OUTPATIENT
Start: 2023-12-08

## 2023-12-08 RX ORDER — RAMIPRIL 2.5 MG/1
5 CAPSULE ORAL DAILY
Qty: 180 CAPSULE | Refills: 1 | Status: SHIPPED | OUTPATIENT
Start: 2023-12-08

## 2023-12-08 RX ORDER — GABAPENTIN 300 MG/1
300 CAPSULE ORAL 3 TIMES DAILY
Qty: 270 CAPSULE | Refills: 0 | Status: SHIPPED | OUTPATIENT
Start: 2023-12-08

## 2023-12-20 ENCOUNTER — OFFICE VISIT (OUTPATIENT)
Dept: FAMILY MEDICINE CLINIC | Facility: CLINIC | Age: 73
End: 2023-12-20
Payer: MEDICARE

## 2023-12-20 VITALS
SYSTOLIC BLOOD PRESSURE: 132 MMHG | HEART RATE: 68 BPM | DIASTOLIC BLOOD PRESSURE: 80 MMHG | BODY MASS INDEX: 28.82 KG/M2 | HEIGHT: 65 IN | OXYGEN SATURATION: 97 % | WEIGHT: 173 LBS

## 2023-12-20 DIAGNOSIS — E11.69 TYPE 2 DIABETES MELLITUS WITH OTHER SPECIFIED COMPLICATION, WITHOUT LONG-TERM CURRENT USE OF INSULIN: Primary | ICD-10-CM

## 2023-12-20 DIAGNOSIS — I10 PRIMARY HYPERTENSION: ICD-10-CM

## 2023-12-20 DIAGNOSIS — F41.9 ANXIETY AND DEPRESSION: ICD-10-CM

## 2023-12-20 DIAGNOSIS — J44.9 CHRONIC OBSTRUCTIVE PULMONARY DISEASE, UNSPECIFIED COPD TYPE: ICD-10-CM

## 2023-12-20 DIAGNOSIS — F32.A ANXIETY AND DEPRESSION: ICD-10-CM

## 2023-12-20 DIAGNOSIS — E03.9 ACQUIRED HYPOTHYROIDISM: ICD-10-CM

## 2023-12-20 PROCEDURE — 84481 FREE ASSAY (FT-3): CPT | Performed by: NURSE PRACTITIONER

## 2023-12-20 PROCEDURE — 83036 HEMOGLOBIN GLYCOSYLATED A1C: CPT | Performed by: NURSE PRACTITIONER

## 2023-12-20 PROCEDURE — 84439 ASSAY OF FREE THYROXINE: CPT | Performed by: NURSE PRACTITIONER

## 2023-12-20 PROCEDURE — 80048 BASIC METABOLIC PNL TOTAL CA: CPT | Performed by: NURSE PRACTITIONER

## 2023-12-20 PROCEDURE — 85027 COMPLETE CBC AUTOMATED: CPT | Performed by: NURSE PRACTITIONER

## 2023-12-20 PROCEDURE — 84443 ASSAY THYROID STIM HORMONE: CPT | Performed by: NURSE PRACTITIONER

## 2023-12-20 NOTE — PROGRESS NOTES
Venipuncture Blood Specimen Collection  Venipuncture performed in the left arm by Sachi Velásquez MA with good hemostasis. Patient tolerated the procedure well without complications.   12/20/23   Sachi Velásquez MA

## 2023-12-20 NOTE — PROGRESS NOTES
"Chief Complaint  Follow-up (3 month follow up DM/dizziness )    Subjective        Radha Barbour presents to [facility]  History of Present Illness    Patient presents for f/u visit.     Patient has hypertension, prescribed 0.2 mg of clonidine daily, Norvasc 5 mg daily and ramipril 5 mg daily.   Blood pressure is stable today.  Patient denies headaches, vision changes, chest pain or dyspnea.     Patient has type 2 diabetes, taking Metformin 1 g daily.  Last A1c was 7.1. Eye exam UTD.  She does not follow strict ADA diet.     Patient started on Symbicort 2 puffs twice daily for COPD - only using PRN.  She is using albuterol as needed for dyspnea or wheezing.  Patient has no acute complaints.    Patient has hypothyroidism, taking Synthroid 100 mcg daily.    Patient is taking Wellbutrin 150 mg daily and Effexor 150 mg daily for depression and anxiety, symptoms are stable.       Objective   Vital Signs:  /80 (BP Location: Left arm, Patient Position: Sitting, Cuff Size: Adult)   Pulse 68   Ht 165.1 cm (65\")   Wt 78.5 kg (173 lb)   SpO2 97%   BMI 28.79 kg/m²   Estimated body mass index is 28.79 kg/m² as calculated from the following:    Height as of this encounter: 165.1 cm (65\").    Weight as of this encounter: 78.5 kg (173 lb).           Physical Exam  Constitutional:       Appearance: Normal appearance.   HENT:      Head: Normocephalic.   Cardiovascular:      Rate and Rhythm: Normal rate and regular rhythm.   Pulmonary:      Effort: Pulmonary effort is normal.      Breath sounds: Normal breath sounds.   Abdominal:      General: Abdomen is flat. Bowel sounds are normal.      Palpations: Abdomen is soft.   Musculoskeletal:         General: Normal range of motion.      Cervical back: Neck supple.      Right lower leg: No edema.      Left lower leg: No edema.   Skin:     General: Skin is warm and dry.   Neurological:      Mental Status: She is alert and oriented to person, " place, and time.      Gait: Gait is intact.   Psychiatric:         Attention and Perception: Attention normal.         Mood and Affect: Mood normal.         Speech: Speech normal.        Result Review :    CMP          6/15/2023    14:50 9/19/2023    12:10   CMP   Glucose 161  157    BUN 18  18    Creatinine 0.93  0.98    EGFR 65.0  61.1    Sodium 143  142    Potassium 4.5  4.5    Chloride 106  101    Calcium 8.7  9.8    Total Protein 7.0  7.6    Albumin 4.3  4.7    Globulin 2.7  2.9    Total Bilirubin 0.2  0.3    Alkaline Phosphatase 74  75    AST (SGOT) 13  13    ALT (SGPT) 10  8    Albumin/Globulin Ratio 1.6  1.6    BUN/Creatinine Ratio 19.4  18.4    Anion Gap 9.0  12.5      CBC          6/15/2023    14:50 9/19/2023    12:10   CBC   WBC 7.99  9.11    RBC 3.74  3.95    Hemoglobin 11.2  11.6    Hematocrit 33.7  34.9    MCV 90.1  88.4    MCH 29.9  29.4    MCHC 33.2  33.2    RDW 12.6  12.5    Platelets 272  324      Lipid Panel          6/15/2023    14:50   Lipid Panel   Total Cholesterol 182    Triglycerides 127    HDL Cholesterol 105    VLDL Cholesterol 21    LDL Cholesterol  56    LDL/HDL Ratio 0.49      TSH          2/13/2023    13:12 6/15/2023    14:50 9/19/2023    12:10   TSH   TSH 0.118  0.826  1.420      Most Recent A1C          9/19/2023    12:10   HGBA1C Most Recent   Hemoglobin A1C 7.10                   Assessment and Plan   Diagnoses and all orders for this visit:    1. Type 2 diabetes mellitus with other specified complication, without long-term current use of insulin (Primary)  Assessment & Plan:  Continue metformin as prescribed  ADA diet recommended  Labs today    Orders:  -     CBC (No Diff)  -     Basic Metabolic Panel  -     Hemoglobin A1c    2. Acquired hypothyroidism  Assessment & Plan:  Repeat thyroid panel, continue Synthroid as prescribed    Orders:  -     TSH  -     T4, Free  -     T3, Free    3. Primary hypertension  Assessment & Plan:  Blood pressure goal less than 140/90  Continue  medications as prescribed      4. Chronic obstructive pulmonary disease, unspecified COPD type  Assessment & Plan:  Recommended patient use Symbicort 2 puffs twice daily  Albuterol as needed        5. Anxiety and depression  Assessment & Plan:  Stable on current medication regimen             I spent 30 minutes caring for Radha on this date of service. This time includes time spent by me in the following activities:preparing for the visit, reviewing tests, obtaining and/or reviewing a separately obtained history, performing a medically appropriate examination and/or evaluation , counseling and educating the patient/family/caregiver, ordering medications, tests, or procedures, documenting information in the medical record, and care coordination  Follow Up   Return in about 3 months (around 3/20/2024) for Medicare Wellness.  Patient was given instructions and counseling regarding her condition or for health maintenance advice. Please see specific information pulled into the AVS if appropriate.

## 2023-12-21 LAB
ANION GAP SERPL CALCULATED.3IONS-SCNC: 13.4 MMOL/L (ref 5–15)
BUN SERPL-MCNC: 11 MG/DL (ref 8–23)
BUN/CREAT SERPL: 12 (ref 7–25)
CALCIUM SPEC-SCNC: 8.9 MG/DL (ref 8.6–10.5)
CHLORIDE SERPL-SCNC: 100 MMOL/L (ref 98–107)
CO2 SERPL-SCNC: 27.6 MMOL/L (ref 22–29)
CREAT SERPL-MCNC: 0.92 MG/DL (ref 0.57–1)
DEPRECATED RDW RBC AUTO: 41.5 FL (ref 37–54)
EGFRCR SERPLBLD CKD-EPI 2021: 65.5 ML/MIN/1.73
ERYTHROCYTE [DISTWIDTH] IN BLOOD BY AUTOMATED COUNT: 12.9 % (ref 12.3–15.4)
GLUCOSE SERPL-MCNC: 120 MG/DL (ref 65–99)
HBA1C MFR BLD: 7.3 % (ref 4.8–5.6)
HCT VFR BLD AUTO: 34.1 % (ref 34–46.6)
HGB BLD-MCNC: 11.6 G/DL (ref 12–15.9)
MCH RBC QN AUTO: 30.3 PG (ref 26.6–33)
MCHC RBC AUTO-ENTMCNC: 34 G/DL (ref 31.5–35.7)
MCV RBC AUTO: 89 FL (ref 79–97)
PLATELET # BLD AUTO: 304 10*3/MM3 (ref 140–450)
PMV BLD AUTO: 10.2 FL (ref 6–12)
POTASSIUM SERPL-SCNC: 3.5 MMOL/L (ref 3.5–5.2)
RBC # BLD AUTO: 3.83 10*6/MM3 (ref 3.77–5.28)
SODIUM SERPL-SCNC: 141 MMOL/L (ref 136–145)
T3FREE SERPL-MCNC: 2.52 PG/ML (ref 2–4.4)
T4 FREE SERPL-MCNC: 1.48 NG/DL (ref 0.93–1.7)
TSH SERPL DL<=0.05 MIU/L-ACNC: 3.53 UIU/ML (ref 0.27–4.2)
WBC NRBC COR # BLD AUTO: 8.75 10*3/MM3 (ref 3.4–10.8)

## 2024-01-10 DIAGNOSIS — R41.89 COGNITIVE IMPAIRMENT: ICD-10-CM

## 2024-01-10 RX ORDER — DONEPEZIL HYDROCHLORIDE 5 MG/1
5 TABLET, FILM COATED ORAL NIGHTLY
Qty: 90 TABLET | Refills: 0 | Status: SHIPPED | OUTPATIENT
Start: 2024-01-10

## 2024-01-10 RX ORDER — PRAVASTATIN SODIUM 20 MG
20 TABLET ORAL DAILY
Qty: 90 TABLET | Refills: 1 | Status: SHIPPED | OUTPATIENT
Start: 2024-01-10

## 2024-01-10 RX ORDER — ROPINIROLE 0.5 MG/1
0.5 TABLET, FILM COATED ORAL
Qty: 90 TABLET | Refills: 1 | Status: SHIPPED | OUTPATIENT
Start: 2024-01-10

## 2024-01-10 RX ORDER — OMEPRAZOLE 40 MG/1
40 CAPSULE, DELAYED RELEASE ORAL DAILY
Qty: 90 CAPSULE | Refills: 1 | Status: SHIPPED | OUTPATIENT
Start: 2024-01-10

## 2024-01-11 ENCOUNTER — CLINICAL SUPPORT (OUTPATIENT)
Dept: FAMILY MEDICINE CLINIC | Facility: CLINIC | Age: 74
End: 2024-01-11
Payer: MEDICARE

## 2024-01-11 DIAGNOSIS — R30.0 DYSURIA: Primary | ICD-10-CM

## 2024-01-11 LAB
BILIRUB BLD-MCNC: NEGATIVE MG/DL
CLARITY, POC: ABNORMAL
COLOR UR: YELLOW
GLUCOSE UR STRIP-MCNC: NEGATIVE MG/DL
KETONES UR QL: NEGATIVE
LEUKOCYTE EST, POC: ABNORMAL
NITRITE UR-MCNC: NEGATIVE MG/ML
PH UR: 5.5 [PH] (ref 5–8)
PROT UR STRIP-MCNC: NEGATIVE MG/DL
RBC # UR STRIP: NEGATIVE /UL
SP GR UR: 1.01 (ref 1–1.03)
UROBILINOGEN UR QL: NORMAL

## 2024-01-11 PROCEDURE — 87086 URINE CULTURE/COLONY COUNT: CPT | Performed by: NURSE PRACTITIONER

## 2024-01-11 PROCEDURE — 87088 URINE BACTERIA CULTURE: CPT | Performed by: NURSE PRACTITIONER

## 2024-01-11 PROCEDURE — 87186 SC STD MICRODIL/AGAR DIL: CPT | Performed by: NURSE PRACTITIONER

## 2024-01-11 NOTE — PROGRESS NOTES
Pt came in today to leave a urine sample due to symptoms. Urine was collected and results sent to provider.    MABEL Pinon

## 2024-01-12 ENCOUNTER — TELEPHONE (OUTPATIENT)
Dept: FAMILY MEDICINE CLINIC | Facility: CLINIC | Age: 74
End: 2024-01-12

## 2024-01-12 RX ORDER — CIPROFLOXACIN 500 MG/1
500 TABLET, FILM COATED ORAL 2 TIMES DAILY
Qty: 14 TABLET | Refills: 0 | Status: SHIPPED | OUTPATIENT
Start: 2024-01-12

## 2024-01-12 NOTE — PROGRESS NOTES
Patient's urine that was collected yesterday is consistent with a urinary tract infection.  I know she is scheduled to be seen later today but I am going ahead and sending an antibiotic to the pharmacy so she can start taking medication

## 2024-01-12 NOTE — TELEPHONE ENCOUNTER
Caller: KARO JEFF    Relationship: Emergency Contact    Best call back number: [phone]     What was the call regarding: LAB RESULTS FROM YESTERDAY

## 2024-01-12 NOTE — TELEPHONE ENCOUNTER
Caller: KARO JEFF    Relationship: Emergency Contact    Best call back number:     [phone] (Mobile)       What was the call regarding: WANTED TO DISCUSS RESULTS AND TO KNOW IF SHE COULD CANCEL HER APPT TODAY    Is it okay if the provider responds through MyChart: CALL PLEASE

## 2024-01-13 LAB — BACTERIA SPEC AEROBE CULT: ABNORMAL

## 2024-02-12 ENCOUNTER — OFFICE VISIT (OUTPATIENT)
Dept: ENDOCRINOLOGY | Facility: CLINIC | Age: 74
End: 2024-02-12
Payer: MEDICARE

## 2024-02-12 VITALS
HEIGHT: 65 IN | WEIGHT: 173.4 LBS | HEART RATE: 67 BPM | OXYGEN SATURATION: 98 % | BODY MASS INDEX: 28.89 KG/M2 | DIASTOLIC BLOOD PRESSURE: 64 MMHG | SYSTOLIC BLOOD PRESSURE: 126 MMHG

## 2024-02-12 DIAGNOSIS — E03.9 ACQUIRED HYPOTHYROIDISM: Primary | ICD-10-CM

## 2024-02-12 PROCEDURE — 3078F DIAST BP <80 MM HG: CPT | Performed by: INTERNAL MEDICINE

## 2024-02-12 PROCEDURE — 1159F MED LIST DOCD IN RCRD: CPT | Performed by: INTERNAL MEDICINE

## 2024-02-12 PROCEDURE — 99213 OFFICE O/P EST LOW 20 MIN: CPT | Performed by: INTERNAL MEDICINE

## 2024-02-12 PROCEDURE — 1160F RVW MEDS BY RX/DR IN RCRD: CPT | Performed by: INTERNAL MEDICINE

## 2024-02-12 PROCEDURE — 3074F SYST BP LT 130 MM HG: CPT | Performed by: INTERNAL MEDICINE

## 2024-02-12 RX ORDER — LEVOTHYROXINE SODIUM 0.1 MG/1
100 TABLET ORAL DAILY
Qty: 90 TABLET | Refills: 3 | Status: SHIPPED | OUTPATIENT
Start: 2024-02-12

## 2024-02-15 ENCOUNTER — HOSPITAL ENCOUNTER (OUTPATIENT)
Facility: HOSPITAL | Age: 74
Discharge: HOME | End: 2024-02-15
Attending: EMERGENCY MEDICINE | Admitting: EMERGENCY MEDICINE
Payer: MEDICARE

## 2024-02-15 VITALS
HEIGHT: 65 IN | DIASTOLIC BLOOD PRESSURE: 78 MMHG | BODY MASS INDEX: 28.82 KG/M2 | RESPIRATION RATE: 20 BRPM | WEIGHT: 173 LBS | OXYGEN SATURATION: 96 % | SYSTOLIC BLOOD PRESSURE: 177 MMHG | TEMPERATURE: 98.1 F | HEART RATE: 62 BPM

## 2024-02-15 DIAGNOSIS — Z13.9 ENCOUNTER FOR MEDICAL SCREENING EXAMINATION: Primary | ICD-10-CM

## 2024-02-15 LAB
BILIRUB UR QL STRIP: NEGATIVE
CLARITY UR: CLEAR
COLOR UR: YELLOW
GLUCOSE UR STRIP-MCNC: NEGATIVE MG/DL
HGB UR QL STRIP.AUTO: NEGATIVE
KETONES UR QL STRIP: NEGATIVE
LEUKOCYTE ESTERASE UR QL STRIP.AUTO: NEGATIVE
NITRITE UR QL STRIP: NEGATIVE
PH UR STRIP.AUTO: 5.5 [PH] (ref 5–8)
PROT UR QL STRIP: NEGATIVE
SP GR UR STRIP: 1.02 (ref 1–1.03)
UROBILINOGEN UR QL STRIP: NORMAL

## 2024-02-15 PROCEDURE — 81003 URINALYSIS AUTO W/O SCOPE: CPT | Performed by: EMERGENCY MEDICINE

## 2024-02-15 PROCEDURE — 99202 OFFICE O/P NEW SF 15 MIN: CPT | Performed by: EMERGENCY MEDICINE

## 2024-02-15 PROCEDURE — G0463 HOSPITAL OUTPT CLINIC VISIT: HCPCS | Performed by: EMERGENCY MEDICINE

## 2024-02-15 NOTE — FSED PROVIDER NOTE
Subjective   History of Present Illness  Patient is a well-appearing 73-year-old female who presents to the emergency room to see if she has a urinary tract infection.  Patient has been having weakness and fatigue for the past year or more.  Daughter states that she was treated for a UTI several weeks ago and she seemed to get slightly better.  She has had her symptoms evaluated already by PCP, neurologist and do not have a definitive diagnosis at this time  Review of Systems   All other systems reviewed and are negative.      Past Medical History:   Diagnosis Date    Anxiety     Arthritis 1998    B12 deficiency 07/08/2016    Last Assessment & Plan:  Formatting of this note might be different from the original.  Compliant and controlled with current medication B 12 supplements . Lab today B 12    Back pain, chronic 07/08/2016    Last Assessment & Plan:  Formatting of this note might be different from the original. Will refer to PT    Cancer 2009    Colon    Colon polyp 2005    Colon Cancer    Coronary artery disease involving native heart without angina pectoris 06/02/2022    Dementia 2021    Doctor prescribed medicine for dementia/memory loss    Depression 07/08/2016    Last Assessment & Plan:  Formatting of this note might be different from the original.  Compliant and controlled with current medication    Diabetes mellitus 06/02/2022    Diabetic peripheral neuropathy 02/14/2020    Last Assessment & Plan:  Formatting of this note might be different from the original. Stable    Difficulty walking 2022    Extreme unsteadiness on feet    DM type 2, goal HbA1c < 7% 03/27/2017    Last Assessment & Plan:  Formatting of this note might be different from the original.  Compliant and controlled with current diet.llabs today A1c    Dyslipidemia 10/11/2017    Last Assessment & Plan:  Formatting of this note might be different from the original.  Compliant and controlled with current medication/statin therapy/labs today     Environmental and seasonal allergies 10/11/2019    Gastric reflux 2017    HTN, goal below 130/80 02/10/2016    Last Assessment & Plan:  Formatting of this note might be different from the original. Compliant and controlled with current medication ramipril /labs today cmp to check renal function and electrolytes    Hyperlipidemia     Hypothyroidism     Last Assessment & Plan:  Formatting of this note might be different from the original. TSH low reduce levothyroxine.  Will check TSH today.    Insomnia 2015    Lumbosacral radiculopathy 2020    Last Assessment & Plan:  Formatting of this note might be different from the original.  Compliant and controlled with current medication Muscle relaxer    Memory loss     Osteoporosis 2020    Formatting of this note is different from the original. RADIOLOGY REPORT   FACILITY:  [facility]/AGE/GENDER: [id number]  OP      AGE:69 Y          SEX:F PATIENT NAME/:  GM DERAS    1950 UNIT NUMBER:  [id number] ACCOUNT NUMBER:  [id number] ACCESSION NUMBER:  [id number]     EXAMINATION: Bone densitometry of multiple sites, lumbar spine, left hip and distal    Restless leg syndrome 2018    Last Assessment & Plan:  Formatting of this note might be different from the original.  Compliant and controlled with current medication requip    Shortness of breath        No Known Allergies    Past Surgical History:   Procedure Laterality Date    BACK SURGERY  2007    lower    CARDIAC CATHETERIZATION Right 04/15/2022    Procedure: Left Heart Cath;  Surgeon: Laila Alvarez MD;  Location: [facility] CATH INVASIVE LOCATION;  Service: Cardiovascular;  Laterality: Right;    CARDIAC CATHETERIZATION  4/15/2022    COLON SURGERY      REPLACEMENT TOTAL KNEE Left     redone in     REPLACEMENT TOTAL KNEE Right        Family History   Problem Relation Age of Onset    Hypertension Mother     Arthritis Mother     Colon  cancer Mother     Thyroid disease Mother     Stroke Mother     Cancer Mother         Colon    Dementia Mother     Heart disease Sister     Heart disease Sister     Dementia Maternal Aunt     Dementia Maternal Aunt        Social History     Socioeconomic History    Marital status:    Tobacco Use    Smoking status: Former     Packs/day: 2.00     Years: 20.00     Additional pack years: 0.00     Total pack years: 40.00     Types: Cigarettes     Quit date: 1998     Years since quittin.1    Smokeless tobacco: Never   Vaping Use    Vaping Use: Never used   Substance and Sexual Activity    Alcohol use: Yes     Comment: socially    Drug use: Never     Comment: CBD oils    Sexual activity: Not Currently     Partners: Male     Birth control/protection: Post-menopausal, None           Objective   Physical Exam  Vitals and nursing note reviewed.   Constitutional:       Appearance: Normal appearance.   HENT:      Head: Normocephalic and atraumatic.      Nose: Nose normal.      Mouth/Throat:      Mouth: Mucous membranes are moist.   Cardiovascular:      Rate and Rhythm: Normal rate and regular rhythm.   Pulmonary:      Effort: Pulmonary effort is normal.      Breath sounds: Normal breath sounds.   Abdominal:      General: Abdomen is flat.   Musculoskeletal:      Cervical back: Normal range of motion and neck supple.   Neurological:      General: No focal deficit present.      Mental Status: She is alert and oriented to person, place, and time.         Procedures           ED Course                                           Medical Decision Making  Patient is here to get her urine checked.  Her UA is entirely normal with no indication of UTI at this time.  Discussed treatment options at length with patient and her daughter.  They state that she has already had extensive testing to address her weakness.  She does not have a definitive diagnosis at this time.  They were only here to check her urine and do not want  further testing at this time.  Patient will be discharged home with close outpatient follow-up and encouraged return to the ED for any worsening in symptoms.    Problems Addressed:  Encounter for medical screening examination: acute illness or injury        Final diagnoses:   Encounter for medical screening examination       ED Disposition  ED Disposition       ED Disposition   Discharge    Condition   Stable    Comment   --               Michell Ramirez, GINA  [address] [address]  [address]  [place] [facility]  [phone]               Medication List      No changes were made to your prescriptions during this visit.

## 2024-03-26 ENCOUNTER — OFFICE VISIT (OUTPATIENT)
Dept: FAMILY MEDICINE CLINIC | Facility: CLINIC | Age: 74
End: 2024-03-26
Payer: MEDICARE

## 2024-03-26 VITALS
OXYGEN SATURATION: 97 % | RESPIRATION RATE: 18 BRPM | BODY MASS INDEX: 28.12 KG/M2 | DIASTOLIC BLOOD PRESSURE: 90 MMHG | TEMPERATURE: 98.2 F | WEIGHT: 168.8 LBS | HEIGHT: 65 IN | SYSTOLIC BLOOD PRESSURE: 142 MMHG | HEART RATE: 66 BPM

## 2024-03-26 DIAGNOSIS — E03.9 ACQUIRED HYPOTHYROIDISM: ICD-10-CM

## 2024-03-26 DIAGNOSIS — R41.89 COGNITIVE IMPAIRMENT: ICD-10-CM

## 2024-03-26 DIAGNOSIS — G45.9 TIA (TRANSIENT ISCHEMIC ATTACK): ICD-10-CM

## 2024-03-26 DIAGNOSIS — E11.69 TYPE 2 DIABETES MELLITUS WITH OTHER SPECIFIED COMPLICATION, WITHOUT LONG-TERM CURRENT USE OF INSULIN: Primary | ICD-10-CM

## 2024-03-26 DIAGNOSIS — Z12.31 SCREENING MAMMOGRAM FOR BREAST CANCER: ICD-10-CM

## 2024-03-26 DIAGNOSIS — I10 PRIMARY HYPERTENSION: ICD-10-CM

## 2024-03-26 DIAGNOSIS — Z00.00 PREVENTATIVE HEALTH CARE: ICD-10-CM

## 2024-03-26 DIAGNOSIS — M54.50 ACUTE RIGHT-SIDED LOW BACK PAIN WITHOUT SCIATICA: ICD-10-CM

## 2024-03-26 DIAGNOSIS — Z00.00 ENCOUNTER FOR SUBSEQUENT ANNUAL WELLNESS VISIT IN MEDICARE PATIENT: ICD-10-CM

## 2024-03-26 PROCEDURE — 83036 HEMOGLOBIN GLYCOSYLATED A1C: CPT | Performed by: NURSE PRACTITIONER

## 2024-03-26 PROCEDURE — 84443 ASSAY THYROID STIM HORMONE: CPT | Performed by: NURSE PRACTITIONER

## 2024-03-26 PROCEDURE — 80048 BASIC METABOLIC PNL TOTAL CA: CPT | Performed by: NURSE PRACTITIONER

## 2024-03-26 PROCEDURE — 84481 FREE ASSAY (FT-3): CPT | Performed by: NURSE PRACTITIONER

## 2024-03-26 PROCEDURE — 86803 HEPATITIS C AB TEST: CPT | Performed by: NURSE PRACTITIONER

## 2024-03-26 PROCEDURE — 84439 ASSAY OF FREE THYROXINE: CPT | Performed by: NURSE PRACTITIONER

## 2024-03-26 PROCEDURE — 85027 COMPLETE CBC AUTOMATED: CPT | Performed by: NURSE PRACTITIONER

## 2024-03-26 RX ORDER — CLOPIDOGREL BISULFATE 75 MG/1
75 TABLET ORAL DAILY
COMMUNITY
Start: 2024-02-26

## 2024-03-26 RX ORDER — DONEPEZIL HYDROCHLORIDE 5 MG/1
5 TABLET, FILM COATED ORAL NIGHTLY
Qty: 90 TABLET | Refills: 0 | Status: SHIPPED | OUTPATIENT
Start: 2024-03-26

## 2024-03-26 RX ORDER — GABAPENTIN 300 MG/1
300 CAPSULE ORAL 3 TIMES DAILY
Qty: 270 CAPSULE | Refills: 0 | Status: SHIPPED | OUTPATIENT
Start: 2024-03-26

## 2024-03-26 RX ORDER — LEVOTHYROXINE SODIUM 0.12 MG/1
125 TABLET ORAL DAILY
COMMUNITY
Start: 2024-02-26

## 2024-03-26 RX ORDER — CLONIDINE HYDROCHLORIDE 0.2 MG/1
0.2 TABLET ORAL DAILY
Qty: 90 TABLET | Refills: 1 | Status: SHIPPED | OUTPATIENT
Start: 2024-03-26

## 2024-03-26 NOTE — PROGRESS NOTES
The ABCs of the Annual Wellness Visit  Welcome to Medicare Visit    Subjective     Radha Barbour is a 73 y.o. female who presents for a  Welcome to Medicare Visit. Patient has hypertension, prescribed 0.2 mg of clonidine daily, Norvasc 5 mg daily and ramipril 5 mg daily. Patient has type 2 diabetes, taking Metformin 1 g daily.  Last A1c was 7.1. She is taking Levothyroxine 125 mcg daily for hypothyroidism (increased during recent hospitalization). Hx COPD, prescribed Symbicort 2 puffs BID - only using PRN. Patient is taking Wellbutrin 150 mg daily and Effexor 150 mg daily for depression and anxiety, symptoms are stable.      The following portions of the patient's history were reviewed and   updated as appropriate: allergies, current medications, past family history, past medical history, past social history, past surgical history, and problem list.     Compared to one year ago, the patient feels her physical   health is the same.    Compared to one year ago, the patient feels her mental   health is the same.    Recent Hospitalizations:  This patient has had a [facility] admission record on file within the last 365 days.    Current Medical Providers:  Patient Care Team:  Michell Ramirez APRN as PCP - General (Nurse Practitioner)    Outpatient Medications Prior to Visit   Medication Sig Dispense Refill    clopidogrel (PLAVIX) 75 MG tablet Take 1 tablet by mouth Daily.      levothyroxine (SYNTHROID, LEVOTHROID) 125 MCG tablet Take 1 tablet by mouth Daily.      albuterol sulfate  (90 Base) MCG/ACT inhaler Inhale 2 puffs Every 4 (Four) Hours As Needed for Wheezing or Shortness of Air. 8 g 0    amLODIPine (NORVASC) 5 MG tablet Take 1 tablet by mouth Daily. 90 tablet 3    aspirin 81 MG EC tablet Take 1 tablet by mouth Daily.      budesonide-formoterol (Symbicort) 160-4.5 MCG/ACT inhaler Inhale 2 puffs 2 (Two) Times a Day. 1 each 12    buPROPion (WELLBUTRIN) 100 MG tablet TAKE 1 TABLET TWICE DAILY 180  tablet 1    CBD (cannabidiol) oral oil Take 1 drop by mouth Daily As Needed.      cloNIDine (CATAPRES) 0.2 MG tablet TAKE 1 TABLET BY MOUTH DAILY 90 tablet 1    donepezil (ARICEPT) 5 MG tablet TAKE ONE TABLET BY MOUTH ONCE NIGHTLY 90 tablet 0    gabapentin (NEURONTIN) 300 MG capsule TAKE ONE CAPSULE BY MOUTH THREE TIMES A  capsule 0    hydrOXYzine (ATARAX) 10 MG tablet Take 1 tablet by mouth Daily. 90 tablet 1    meclizine (ANTIVERT) 25 MG tablet TAKE 1 TABLET THREE TIMES DAILY AS NEEDED FOR  DIZZINESS 180 tablet 0    memantine (NAMENDA) 10 MG tablet Take 1 tablet by mouth 2 (Two) Times a Day. 180 tablet 1    metFORMIN (GLUCOPHAGE) 1000 MG tablet TAKE 1 TABLET EVERY DAY WITH BREAKFAST 90 tablet 1    omeprazole (priLOSEC) 40 MG capsule Take 1 capsule by mouth Daily. 90 capsule 1    pravastatin (PRAVACHOL) 20 MG tablet Take 1 tablet by mouth Daily. 90 tablet 1    ramipril (ALTACE) 2.5 MG capsule Take 2 capsules by mouth Daily. 180 capsule 1    rOPINIRole (REQUIP) 0.5 MG tablet Take 1 tablet by mouth every night at bedtime. Take 1 hour before bedtime. 90 tablet 1    traZODone (DESYREL) 50 MG tablet Take 1 tablet by mouth At Night As Needed for Sleep. 30 tablet 1    venlafaxine XR (EFFEXOR-XR) 150 MG 24 hr capsule TAKE 1 CAPSULE TWICE DAILY 180 capsule 1    vitamin B-12 (CYANOCOBALAMIN) 1000 MCG tablet Take 1 tablet by mouth Daily.      Vitamin D, Cholecalciferol, 50 MCG (2000 UT) capsule Take 1 capsule by mouth Daily.      ciprofloxacin (Cipro) 500 MG tablet Take 1 tablet by mouth 2 (Two) Times a Day. 14 tablet 0    levothyroxine (SYNTHROID, LEVOTHROID) 100 MCG tablet Take 1 tablet by mouth Daily. 90 tablet 3     No facility-administered medications prior to visit.       No opioid medication identified on active medication list. I have reviewed chart for other potential  high risk medication/s and harmful drug interactions in the elderly.        Aspirin is on active medication list. Aspirin use is indicated  "based on review of current medical condition/s. Pros and cons of this therapy have been discussed today. Benefits of this medication outweigh potential harm.  Patient has been encouraged to continue taking this medication.  .      Patient Active Problem List   Diagnosis    B12 deficiency    Back pain, chronic    Depression    Diabetic peripheral neuropathy    Restless leg syndrome    Osteoporosis    OAB (overactive bladder)    Lumbosacral radiculopathy    Insomnia    Hypothyroidism    HTN, goal below 130/80    Gastric reflux    Environmental and seasonal allergies    Dyslipidemia    DM type 2, goal HbA1c < 7%    Angina pectoris    SOB (shortness of breath)    Abnormal results of cardiovascular function studies    Dizziness    Cognitive impairment    Vitamin D deficiency    Diabetes mellitus    Primary hypertension    Anxiety    Neuropathy    Preventative health care    Coronary artery disease involving native heart without angina pectoris    Screening mammogram for breast cancer    Frequent falls    Bronchitis    Urinary tract infection without hematuria    Chronic obstructive pulmonary disease    Need for influenza vaccination    Anxiety and depression    Acute right-sided low back pain without sciatica    Encounter for subsequent annual wellness visit in Medicare patient    TIA (transient ischemic attack)     Advance Care Planning   Advance Care Planning     Advance Directive is not on file.  ACP discussion was held with the patient during this visit. Patient does not have an advance directive, information provided.       Objective   Vitals:    03/26/24 1436   BP: 142/90   BP Location: Left arm   Patient Position: Sitting   Cuff Size: Adult   Pulse: 66   Resp: 18   Temp: 98.2 °F (36.8 °C)   TempSrc: Oral   SpO2: 97%  Comment: Room air   Weight: 76.6 kg (168 lb 12.8 oz)   Height: 165.1 cm (65\")   PainSc: 0-No pain     Estimated body mass index is 28.09 kg/m² as calculated from the following:    Height as of this " "encounter: 165.1 cm (65\").    Weight as of this encounter: 76.6 kg (168 lb 12.8 oz).           Does the patient have evidence of cognitive impairment?   Yes: Continue current medications.    ATTENTION  What is the year: correct  What is the month of the year: correct  What is the day of the week?: correct  What is the date?: incorrect  MEMORY  Repeat address three times, only score third attempt: [address], [place]: 6  HOW MANY ANIMALS DID THE PATIENT NAME  Verbal Fluency -- Animal Names (0-25): 11-13  CLOCK DRAWING  Clock Drawing: Clock Hands  MEMORY RECALL  Tell me what you remember about that name and address we were repeating at the beginnin  ACE TOTAL SCORE  Total ACE Score - <25/30 strongly suggests cognitive impairment; <21/30 almost certainly shows dementia: 19      Lab Results   Component Value Date    HGBA1C 6.90 (H) 2024       Procedures       HEALTH RISK ASSESSMENT    Smoking Status:  Social History     Tobacco Use   Smoking Status Former    Current packs/day: 0.00    Average packs/day: 2.0 packs/day for 20.0 years (40.0 ttl pk-yrs)    Types: Cigarettes    Start date: 1978    Quit date: 1998    Years since quittin.2   Smokeless Tobacco Never     Alcohol Consumption:  Social History     Substance and Sexual Activity   Alcohol Use Yes    Comment: socially       Fall Risk Screen:    KRISTEN Fall Risk Assessment was completed, and patient is at MODERATE risk for falls. Assessment completed on:3/26/2024    Depression Screen:       3/26/2024     2:00 PM   PHQ-2/PHQ-9 Depression Screening   Little Interest or Pleasure in Doing Things 1-->several days   Feeling Down, Depressed or Hopeless 1-->several days   Trouble Falling or Staying Asleep, or Sleeping Too Much 0-->not at all   Feeling Tired or Having Little Energy 0-->not at all   Poor Appetite or Overeating 0-->not at all   Feeling Bad about Yourself - or that You are a Failure or Have Let Yourself or Your " Family Down 0-->not at all   Trouble Concentrating on Things, Such as Reading the Newspaper or Watching Television 0-->not at all   Moving or Speaking So Slowly that Other People Could Have Noticed? Or the Opposite - Being So Fidgety 0-->not at all   Thoughts that You Would be Better Off Dead or of Hurting Yourself in Some Way 0-->not at all   PHQ-9: Brief Depression Severity Measure Score 2   If You Checked Off Any Problems, How Difficult Have These Problems Made It For You to Do Your Work, Take Care of Things at Home, or Get Along with Other People? not difficult at all       Health Habits and Functional and Cognitive Screening:      3/26/2024     2:00 PM   Functional & Cognitive Status   Do you have difficulty preparing food and eating? No   Do you have difficulty bathing yourself, getting dressed or grooming yourself? No   Do you have difficulty using the toilet? No   Do you have difficulty moving around from place to place? No   Do you have trouble with steps or getting out of a bed or a chair? No   Current Diet Unhealthy Diet   Dental Exam Up to date   Eye Exam Not up to date        Eye Exam Comment [facility]   Exercise (times per week) 0 times per week   Current Exercises Include No Regular Exercise   Do you need help using the phone?  No   Are you deaf or do you have serious difficulty hearing?  No   Do you need help to go to places out of walking distance? No   Do you need help shopping? No   Do you need help preparing meals?  No   Do you need help with housework?  No   Do you need help with laundry? No   Do you need help taking your medications? Yes   Do you need help managing money? No   Do you ever drive or ride in a car without wearing a seat belt? No   Have you felt unusual stress, anger or loneliness in the last month? No   Who do you live with? Sibling   If you need help, do you have trouble finding someone available to you? No   Have you been bothered in the last four weeks by sexual problems? No    Do you have difficulty concentrating, remembering or making decisions? No       Visual Acuity:    No results found.    Age-appropriate Screening Schedule:  Refer to the list below for future screening recommendations based on patient's age, sex and/or medical conditions. Orders for these recommended tests are listed in the plan section. The patient has been provided with a written plan.    Health Maintenance   Topic Date Due    ZOSTER VACCINE (1 of 2) Never done    RSV Vaccine - Adults (1 - 1-dose 60+ series) Never done    DIABETIC FOOT EXAM  Never done    COVID-19 Vaccine (4 - 2023-24 season) 09/01/2023    DXA SCAN  04/27/2024    LIPID PANEL  06/15/2024    URINE MICROALBUMIN  06/15/2024    BMI FOLLOWUP  06/15/2024    DIABETIC EYE EXAM  07/03/2024    HEMOGLOBIN A1C  09/26/2024    MAMMOGRAM  12/27/2024    ANNUAL WELLNESS VISIT  03/26/2025    TDAP/TD VACCINES (3 - Td or Tdap) 06/13/2028    COLORECTAL CANCER SCREENING  07/22/2030    HEPATITIS C SCREENING  Completed    INFLUENZA VACCINE  Completed    Pneumococcal Vaccine 65+  Completed        CMS Preventative Services Quick Reference  Risk Factors Identified During Encounter    Fall Risk-High or Moderate: Discussed Fall Prevention in the home  Immunizations Discussed/Encouraged: Tdap, Influenza, Prevnar 20 (Pneumococcal 20-valent conjugate), Shingrix, COVID19, and RSV (Respiratory Syncytial Virus)  Dental Screening Recommended  Vision Screening Recommended  The above risks/problems have been discussed with the patient.  Pertinent information has been shared with the patient in the After Visit Summary.    Follow Up:   Initial Medicare Visit in one year    An After Visit Summary and PPPS were made available to the patient.      Additional E&M Note during same encounter follows:  Patient has multiple medical problems which are significant and separately identifiable that require additional work above and beyond the Medicare Wellness Visit.      Chief Complaint  Medicare  "Wellness-subsequent and Hospital Follow Up Visit (Nhan Jiménez 02/24-02-26 Possible TIA. Has home health PT with [facility])    Subjective        HPI  Radha Barbour is also being seen today for f/u visit following recent hospitalization at [facility]. Patient went in with weakness/leaning to right side. She was diagnosed with ? TIA. Records not in chart for review. Patient will see Neurology on 4/22. Plavix was added to patient regimen. Hx cognitive impairment, taking Namenda and Aricept as prescribed.     Patient also c/o low back pain, worse to right that does not radiate down lower extremity.          Objective   Vital Signs:  /90 (BP Location: Left arm, Patient Position: Sitting, Cuff Size: Adult)   Pulse 66   Temp 98.2 °F (36.8 °C) (Oral)   Resp 18   Ht 165.1 cm (65\")   Wt 76.6 kg (168 lb 12.8 oz)   SpO2 97% Comment: Room air  BMI 28.09 kg/m²     Physical Exam  Constitutional:       Appearance: Normal appearance.   HENT:      Head: Normocephalic.   Cardiovascular:      Rate and Rhythm: Normal rate and regular rhythm.   Pulmonary:      Effort: Pulmonary effort is normal.      Breath sounds: Normal breath sounds.   Abdominal:      General: Abdomen is flat. Bowel sounds are normal.      Palpations: Abdomen is soft.   Musculoskeletal:         General: Normal range of motion.      Cervical back: Neck supple.      Right lower leg: No edema.      Left lower leg: No edema.   Skin:     General: Skin is warm and dry.   Neurological:      Mental Status: She is alert and oriented to person, place, and time.      Gait: Gait is intact.   Psychiatric:         Attention and Perception: Attention normal.         Mood and Affect: Mood normal.         Speech: Speech normal.            CMP          9/19/2023    12:10 12/20/2023    12:06 3/26/2024    15:24   CMP   Glucose 157  120  123    BUN 18  11  16    Creatinine 0.98  0.92  1.09    EGFR 61.1  65.5  53.4    Sodium 142  141  143    Potassium 4.5  3.5  4.8    Chloride 101  " 100  102    Calcium 9.8  8.9  9.4    Total Protein 7.6      Albumin 4.7      Globulin 2.9      Total Bilirubin 0.3      Alkaline Phosphatase 75      AST (SGOT) 13      ALT (SGPT) 8      Albumin/Globulin Ratio 1.6      BUN/Creatinine Ratio 18.4  12.0  14.7    Anion Gap 12.5  13.4  12.0      CBC          9/19/2023    12:10 12/20/2023    12:06 3/26/2024    15:24   CBC   WBC 9.11  8.75  7.66    RBC 3.95  3.83  4.01    Hemoglobin 11.6  11.6  11.9    Hematocrit 34.9  34.1  36.4    MCV 88.4  89.0  90.8    MCH 29.4  30.3  29.7    MCHC 33.2  34.0  32.7    RDW 12.5  12.9  12.3    Platelets 324  304  295      Lipid Panel          6/15/2023    14:50   Lipid Panel   Total Cholesterol 182    Triglycerides 127    HDL Cholesterol 105    VLDL Cholesterol 21    LDL Cholesterol  56    LDL/HDL Ratio 0.49      TSH          9/19/2023    12:10 12/20/2023    12:06 3/26/2024    15:24   TSH   TSH 1.420  3.530  0.813      Most Recent A1C          3/26/2024    15:24   HGBA1C Most Recent   Hemoglobin A1C 6.90              Assessment and Plan   Diagnoses and all orders for this visit:    1. Type 2 diabetes mellitus with other specified complication, without long-term current use of insulin (Primary)  Assessment & Plan:  Labs today  Continue Metformin as prescribed  ADA diet    Orders:  -     Hemoglobin A1c  -     CBC (No Diff)  -     Basic Metabolic Panel    2. Cognitive impairment  Assessment & Plan:  Continue Namenda and Aricept   Follow up with Neurology      3. Acquired hypothyroidism  Assessment & Plan:  Repeat thyroid panel      Orders:  -     TSH  -     T4, Free  -     T3, Free    4. Primary hypertension  Assessment & Plan:  Continue medications as prescribed      5. Screening mammogram for breast cancer  -     Mammo Screening Digital Tomosynthesis Bilateral With CAD; Future    6. Preventative health care  -     Hepatitis C Antibody    7. Acute right-sided low back pain without sciatica  Assessment & Plan:  Exercises provided to patient,  complete once daily  If persists, plan for x-ray      8. Encounter for subsequent annual wellness visit in Medicare patient    9. TIA (transient ischemic attack)  Assessment & Plan:  RR from [facility]  Continue Plavix as prescribed  Follow up with Neurology             I spent 35 minutes caring for Radha on this date of service. This time includes time spent by me in the following activities:preparing for the visit, reviewing tests, obtaining and/or reviewing a separately obtained history, performing a medically appropriate examination and/or evaluation , counseling and educating the patient/family/caregiver, ordering medications, tests, or procedures, documenting information in the medical record, and care coordination  Follow Up   Return in about 3 months (around 6/26/2024) for DMII.  Patient was given instructions and counseling regarding her condition or for health maintenance advice. Please see specific information pulled into the AVS if appropriate.

## 2024-03-26 NOTE — PROGRESS NOTES
Venipuncture Blood Specimen Collection  Venipuncture performed in the left arm by Sachi Velásqeuz MA with good hemostasis. Patient tolerated the procedure well without complications.   03/26/24   Sachi Velásquez MA

## 2024-03-27 LAB
ANION GAP SERPL CALCULATED.3IONS-SCNC: 12 MMOL/L (ref 5–15)
BUN SERPL-MCNC: 16 MG/DL (ref 8–23)
BUN/CREAT SERPL: 14.7 (ref 7–25)
CALCIUM SPEC-SCNC: 9.4 MG/DL (ref 8.6–10.5)
CHLORIDE SERPL-SCNC: 102 MMOL/L (ref 98–107)
CO2 SERPL-SCNC: 29 MMOL/L (ref 22–29)
CREAT SERPL-MCNC: 1.09 MG/DL (ref 0.57–1)
DEPRECATED RDW RBC AUTO: 40.1 FL (ref 37–54)
EGFRCR SERPLBLD CKD-EPI 2021: 53.4 ML/MIN/1.73
ERYTHROCYTE [DISTWIDTH] IN BLOOD BY AUTOMATED COUNT: 12.3 % (ref 12.3–15.4)
GLUCOSE SERPL-MCNC: 123 MG/DL (ref 65–99)
HBA1C MFR BLD: 6.9 % (ref 4.8–5.6)
HCT VFR BLD AUTO: 36.4 % (ref 34–46.6)
HCV AB SER DONR QL: NORMAL
HGB BLD-MCNC: 11.9 G/DL (ref 12–15.9)
MCH RBC QN AUTO: 29.7 PG (ref 26.6–33)
MCHC RBC AUTO-ENTMCNC: 32.7 G/DL (ref 31.5–35.7)
MCV RBC AUTO: 90.8 FL (ref 79–97)
PLATELET # BLD AUTO: 295 10*3/MM3 (ref 140–450)
PMV BLD AUTO: 9.8 FL (ref 6–12)
POTASSIUM SERPL-SCNC: 4.8 MMOL/L (ref 3.5–5.2)
RBC # BLD AUTO: 4.01 10*6/MM3 (ref 3.77–5.28)
SODIUM SERPL-SCNC: 143 MMOL/L (ref 136–145)
T3FREE SERPL-MCNC: 2.84 PG/ML (ref 2–4.4)
T4 FREE SERPL-MCNC: 1.63 NG/DL (ref 0.93–1.7)
TSH SERPL DL<=0.05 MIU/L-ACNC: 0.81 UIU/ML (ref 0.27–4.2)
WBC NRBC COR # BLD AUTO: 7.66 10*3/MM3 (ref 3.4–10.8)

## 2024-03-28 PROBLEM — G45.9 TIA (TRANSIENT ISCHEMIC ATTACK): Status: ACTIVE | Noted: 2024-03-28

## 2024-03-28 PROBLEM — M54.50 ACUTE RIGHT-SIDED LOW BACK PAIN WITHOUT SCIATICA: Status: ACTIVE | Noted: 2024-03-28

## 2024-03-28 PROBLEM — Z00.00 ENCOUNTER FOR SUBSEQUENT ANNUAL WELLNESS VISIT IN MEDICARE PATIENT: Status: ACTIVE | Noted: 2024-03-28

## 2024-04-02 RX ORDER — CIPROFLOXACIN 500 MG/1
500 TABLET, FILM COATED ORAL 2 TIMES DAILY
Qty: 14 TABLET | Refills: 0 | OUTPATIENT
Start: 2024-04-02

## 2024-04-08 DIAGNOSIS — F41.9 ANXIETY: ICD-10-CM

## 2024-04-08 RX ORDER — BUPROPION HYDROCHLORIDE 100 MG/1
100 TABLET ORAL 2 TIMES DAILY
Qty: 180 TABLET | Refills: 1 | Status: SHIPPED | OUTPATIENT
Start: 2024-04-08

## 2024-04-08 RX ORDER — VENLAFAXINE HYDROCHLORIDE 150 MG/1
150 CAPSULE, EXTENDED RELEASE ORAL 2 TIMES DAILY
Qty: 180 CAPSULE | Refills: 1 | Status: SHIPPED | OUTPATIENT
Start: 2024-04-08

## 2024-04-18 NOTE — PROGRESS NOTES
"Chief Complaint  Follow-up (MEMORY)    Subjective          Radha Barbour presents to [facility] for MEMORY  History of Present Illness  Patient is her to f/u on memory she was last seen 2022,she states her memory has been about the same since last visit. she currently takes aricept 5 mg 1 tab qd and namenda 10 mg 1 bid which has been being prescribed by her pcp. Patient daughter states patient goes through confusion spells,swaying to the side slurred speech and dizziness which patient does have a h/o vertigo    Lives with sister and brother in law.  Daughter sets up medications.   Pts. maternal aunt had Alzheimer,   Sister has memory problems.          Maximum   Score Patient's   Score Questions   5 1 \"What is the year?Season?Date?Day of the week?Month?\"   5 5 \"Where are we now: State?County?Town/city?Hospital?Floor?\"   3 3 3 Unrelated objects Number of trials:___   5 2 Count backward from 100 by sevens or spell WORLD backwards   3 2 Name 3 things from above   2 2 Identify 2 objects   1 1  Repeat the phrase: No ifs, ands,or buts.   3 3 Take paper in right hand, fold it in half, and put it on the floor.   1 1  Please read this and do what it says. \"Close your eyes\"   1 1 Make up and write a sentence about anything. Noun and verb   1 0 Copy this picture 10 angles must be present.   30 21 Total MMSE             ===PREV. OV 10/20/22=====  New patient referred by Michell FUENTES for memory and dizziness.     Pt has noted trouble with memory for year or two , mostly remembering recent items.   Pt has gotten lost driving so has not driven for 6 months.   No need for help with ADL's  Gets confused easily , worse in evening.   No hallucinations.     Memory- she is currently taking namenda 10 mg 1 bid, and aricept 5 mg 1 qd. Has been having issues with this approx 1 year.      Dizziness- currently taking meclizine 25 mg 1 tid prn, has had approx 1 year. Sometimes feels more unsteady vs " "dizzy. Some time vertigo, mostly poor balance.   She has fallen a lot.     Pt had dementia onset mid 70's and two of her mothers sisters at least one with Alzheimer.     Pt does snore and co fatigue during the day.     ====MRI OREN WO 4/6/22=====  IMPRESSION:  NO ACUTE INTRACRANIAL ABNORMALITY WITH MILD SMALL VESSEL ISCHEMIC CHANGES.     =====CT HEAD WO CONTRAST 3/5/22=====  IMPRESSION:   Negative        Maximum   Score Patient's   Score Questions   5 3 \"What is the year?Season?Date?Day of the week?Month?\"   5 5 \"Where are we now: State?County?Town/city?Hospital?Floor?\"   3 3 3 Unrelated objects Number of trials:___   5 5 Count backward from 100 by sevens or spell WORLD backwards   3 2 Name 3 things from above   2 2 Identify 2 objects   1 0 Repeat the phrase: No ifs, ands,or buts.   3 3 Take paper in right hand, fold it in half, and put it on the floor.   1 1 Please read this and do what it says. \"Close your eyes\"   1 1 Make up and write a sentence about anything. Noun and verb   1 1 Copy this picture 10 angles must be present.   30 26 Total MMSE       Current Outpatient Medications:     albuterol sulfate  (90 Base) MCG/ACT inhaler, Inhale 2 puffs Every 4 (Four) Hours As Needed for Wheezing or Shortness of Air., Disp: 8 g, Rfl: 0    amLODIPine (NORVASC) 5 MG tablet, Take 1 tablet by mouth Daily., Disp: 90 tablet, Rfl: 3    aspirin 81 MG EC tablet, Take 1 tablet by mouth Daily., Disp: , Rfl:     budesonide-formoterol (Symbicort) 160-4.5 MCG/ACT inhaler, Inhale 2 puffs 2 (Two) Times a Day., Disp: 1 each, Rfl: 12    buPROPion (WELLBUTRIN) 100 MG tablet, Take 1 tablet by mouth 2 (Two) Times a Day., Disp: 180 tablet, Rfl: 1    CBD (cannabidiol) oral oil, Take 1 drop by mouth Daily As Needed., Disp: , Rfl:     cloNIDine (CATAPRES) 0.2 MG tablet, TAKE 1 TABLET BY MOUTH DAILY, Disp: 90 tablet, Rfl: 1    clopidogrel (PLAVIX) 75 MG tablet, Take 1 tablet by mouth Daily., Disp: , Rfl:     donepezil (ARICEPT) 10 MG " tablet, Take 1 tablet by mouth Every Night., Disp: 90 tablet, Rfl: 3    erythromycin (ROMYCIN) 5 MG/GM ophthalmic ointment, , Disp: , Rfl:     gabapentin (NEURONTIN) 300 MG capsule, TAKE ONE CAPSULE BY MOUTH THREE TIMES A DAY, Disp: 270 capsule, Rfl: 0    hydrOXYzine (ATARAX) 10 MG tablet, Take 1 tablet by mouth Daily., Disp: 90 tablet, Rfl: 1    levothyroxine (SYNTHROID, LEVOTHROID) 125 MCG tablet, Take 1 tablet by mouth Daily., Disp: , Rfl:     meclizine (ANTIVERT) 25 MG tablet, TAKE 1 TABLET THREE TIMES DAILY AS NEEDED FOR  DIZZINESS, Disp: 180 tablet, Rfl: 0    memantine (NAMENDA) 10 MG tablet, Take 1 tablet by mouth 2 (Two) Times a Day., Disp: 180 tablet, Rfl: 1    metFORMIN (GLUCOPHAGE) 1000 MG tablet, Take 1 tablet by mouth Daily With Breakfast., Disp: 90 tablet, Rfl: 1    omeprazole (priLOSEC) 40 MG capsule, Take 1 capsule by mouth Daily., Disp: 90 capsule, Rfl: 1    pravastatin (PRAVACHOL) 20 MG tablet, Take 1 tablet by mouth Daily., Disp: 90 tablet, Rfl: 1    ramipril (ALTACE) 2.5 MG capsule, Take 2 capsules by mouth Daily., Disp: 180 capsule, Rfl: 1    rOPINIRole (REQUIP) 0.5 MG tablet, Take 1 tablet by mouth every night at bedtime. Take 1 hour before bedtime., Disp: 90 tablet, Rfl: 1    traZODone (DESYREL) 50 MG tablet, Take 1 tablet by mouth At Night As Needed for Sleep., Disp: 30 tablet, Rfl: 1    venlafaxine XR (EFFEXOR-XR) 150 MG 24 hr capsule, Take 1 capsule by mouth 2 (Two) Times a Day., Disp: 180 capsule, Rfl: 1    vitamin B-12 (CYANOCOBALAMIN) 1000 MCG tablet, Take 1 tablet by mouth Daily., Disp: , Rfl:     Vitamin D, Cholecalciferol, 50 MCG (2000 UT) capsule, Take 1 capsule by mouth Daily., Disp: , Rfl:     Review of Systems   Constitutional:  Negative for fatigue.   Respiratory:  Negative for shortness of breath.    Cardiovascular:  Negative for chest pain.   Psychiatric/Behavioral:  Positive for confusion. Negative for sleep disturbance.         Objective:    Vital Signs:   /76   Pulse  "58   Ht 165.1 cm (65\")   Wt 76.7 kg (169 lb)   BMI 28.12 kg/m²     Physical Exam  Vitals reviewed.   Constitutional:       Appearance: She is normal weight.   Pulmonary:      Effort: Pulmonary effort is normal. No respiratory distress.   Neurological:      Mental Status: She is alert and oriented to person, place, and time.   Psychiatric:         Mood and Affect: Mood normal.      Result Review :                Neurologic Exam     Mental Status   Oriented to person, place, and time.       Assessment and Plan    Diagnoses and all orders for this visit:    1. Cognitive impairment  -     donepezil (ARICEPT) 10 MG tablet; Take 1 tablet by mouth Every Night.  Dispense: 90 tablet; Refill: 3     Continue namenda 10mg bid and increase Aricept to 10 mg per day    Follow Up   Return in about 1 year (around 4/22/2025).  Patient was given instructions and counseling regarding her condition or for health maintenance advice. Please see specific information pulled into the AVS if appropriate.     This document has been electronically signed by Joseph Seipel, MD on April 22, 2024 14:48 EDT      "

## 2024-04-22 ENCOUNTER — OFFICE VISIT (OUTPATIENT)
Dept: NEUROLOGY | Facility: CLINIC | Age: 74
End: 2024-04-22
Payer: MEDICARE

## 2024-04-22 VITALS
SYSTOLIC BLOOD PRESSURE: 129 MMHG | BODY MASS INDEX: 28.16 KG/M2 | HEART RATE: 58 BPM | WEIGHT: 169 LBS | DIASTOLIC BLOOD PRESSURE: 76 MMHG | HEIGHT: 65 IN

## 2024-04-22 DIAGNOSIS — R41.89 COGNITIVE IMPAIRMENT: ICD-10-CM

## 2024-04-22 PROCEDURE — 1159F MED LIST DOCD IN RCRD: CPT | Performed by: PSYCHIATRY & NEUROLOGY

## 2024-04-22 PROCEDURE — 3074F SYST BP LT 130 MM HG: CPT | Performed by: PSYCHIATRY & NEUROLOGY

## 2024-04-22 PROCEDURE — 99214 OFFICE O/P EST MOD 30 MIN: CPT | Performed by: PSYCHIATRY & NEUROLOGY

## 2024-04-22 PROCEDURE — 1160F RVW MEDS BY RX/DR IN RCRD: CPT | Performed by: PSYCHIATRY & NEUROLOGY

## 2024-04-22 PROCEDURE — 3078F DIAST BP <80 MM HG: CPT | Performed by: PSYCHIATRY & NEUROLOGY

## 2024-04-22 RX ORDER — ERYTHROMYCIN 5 MG/G
OINTMENT OPHTHALMIC
COMMUNITY
Start: 2024-04-09

## 2024-04-22 RX ORDER — DONEPEZIL HYDROCHLORIDE 10 MG/1
10 TABLET, FILM COATED ORAL NIGHTLY
Qty: 90 TABLET | Refills: 3 | Status: SHIPPED | OUTPATIENT
Start: 2024-04-22

## 2024-05-29 ENCOUNTER — TELEPHONE (OUTPATIENT)
Dept: FAMILY MEDICINE CLINIC | Facility: CLINIC | Age: 74
End: 2024-05-29
Payer: MEDICARE

## 2024-05-29 RX ORDER — CLOPIDOGREL BISULFATE 75 MG/1
75 TABLET ORAL DAILY
Qty: 30 TABLET | Refills: 3 | Status: SHIPPED | OUTPATIENT
Start: 2024-05-29

## 2024-05-29 NOTE — TELEPHONE ENCOUNTER
Attempted to call pt needing to reschedule 6/28 appt, called [phone] number there is no verbal on file was not able to speak to pt daughter, called [phone] number lvm for pt to call office

## 2024-06-04 DIAGNOSIS — R41.89 COGNITIVE IMPAIRMENT: ICD-10-CM

## 2024-06-04 RX ORDER — DONEPEZIL HYDROCHLORIDE 5 MG/1
5 TABLET, FILM COATED ORAL NIGHTLY
Qty: 90 TABLET | Refills: 0 | OUTPATIENT
Start: 2024-06-04

## 2024-06-24 RX ORDER — MEMANTINE HYDROCHLORIDE 10 MG/1
10 TABLET ORAL 2 TIMES DAILY
Qty: 180 TABLET | Refills: 1 | Status: SHIPPED | OUTPATIENT
Start: 2024-06-24

## 2024-06-24 RX ORDER — GABAPENTIN 300 MG/1
300 CAPSULE ORAL 3 TIMES DAILY
Qty: 270 CAPSULE | Refills: 0 | Status: SHIPPED | OUTPATIENT
Start: 2024-06-24

## 2024-06-24 RX ORDER — RAMIPRIL 2.5 MG/1
5 CAPSULE ORAL DAILY
Qty: 180 CAPSULE | Refills: 1 | Status: SHIPPED | OUTPATIENT
Start: 2024-06-24

## 2024-07-05 ENCOUNTER — OFFICE VISIT (OUTPATIENT)
Dept: FAMILY MEDICINE CLINIC | Facility: CLINIC | Age: 74
End: 2024-07-05
Payer: MEDICARE

## 2024-07-05 VITALS
WEIGHT: 174 LBS | BODY MASS INDEX: 28.99 KG/M2 | HEART RATE: 66 BPM | SYSTOLIC BLOOD PRESSURE: 146 MMHG | DIASTOLIC BLOOD PRESSURE: 77 MMHG | HEIGHT: 65 IN | TEMPERATURE: 98.4 F | OXYGEN SATURATION: 93 %

## 2024-07-05 DIAGNOSIS — N32.81 OAB (OVERACTIVE BLADDER): ICD-10-CM

## 2024-07-05 DIAGNOSIS — E03.9 ACQUIRED HYPOTHYROIDISM: ICD-10-CM

## 2024-07-05 DIAGNOSIS — J44.9 CHRONIC OBSTRUCTIVE PULMONARY DISEASE, UNSPECIFIED COPD TYPE: ICD-10-CM

## 2024-07-05 DIAGNOSIS — Z00.00 PREVENTATIVE HEALTH CARE: ICD-10-CM

## 2024-07-05 DIAGNOSIS — R41.89 COGNITIVE IMPAIRMENT: ICD-10-CM

## 2024-07-05 DIAGNOSIS — Z13.820 ENCOUNTER FOR SCREENING FOR OSTEOPOROSIS: Primary | ICD-10-CM

## 2024-07-05 DIAGNOSIS — Z78.0 ASYMPTOMATIC MENOPAUSE: ICD-10-CM

## 2024-07-05 DIAGNOSIS — I10 PRIMARY HYPERTENSION: ICD-10-CM

## 2024-07-05 DIAGNOSIS — R42 DIZZINESS: ICD-10-CM

## 2024-07-05 DIAGNOSIS — E11.69 TYPE 2 DIABETES MELLITUS WITH OTHER SPECIFIED COMPLICATION, WITHOUT LONG-TERM CURRENT USE OF INSULIN: ICD-10-CM

## 2024-07-05 DIAGNOSIS — G25.81 RESTLESS LEG SYNDROME: ICD-10-CM

## 2024-07-05 DIAGNOSIS — F41.9 ANXIETY: ICD-10-CM

## 2024-07-05 LAB
DEPRECATED RDW RBC AUTO: 44.2 FL (ref 37–54)
ERYTHROCYTE [DISTWIDTH] IN BLOOD BY AUTOMATED COUNT: 13.5 % (ref 12.3–15.4)
HBA1C MFR BLD: 6.6 % (ref 4.8–5.6)
HCT VFR BLD AUTO: 34.9 % (ref 34–46.6)
HGB BLD-MCNC: 11.4 G/DL (ref 12–15.9)
MCH RBC QN AUTO: 29.5 PG (ref 26.6–33)
MCHC RBC AUTO-ENTMCNC: 32.7 G/DL (ref 31.5–35.7)
MCV RBC AUTO: 90.2 FL (ref 79–97)
PLATELET # BLD AUTO: 301 10*3/MM3 (ref 140–450)
PMV BLD AUTO: 9.5 FL (ref 6–12)
RBC # BLD AUTO: 3.87 10*6/MM3 (ref 3.77–5.28)
WBC NRBC COR # BLD AUTO: 8.81 10*3/MM3 (ref 3.4–10.8)

## 2024-07-05 PROCEDURE — 80053 COMPREHEN METABOLIC PANEL: CPT | Performed by: NURSE PRACTITIONER

## 2024-07-05 PROCEDURE — 80061 LIPID PANEL: CPT | Performed by: NURSE PRACTITIONER

## 2024-07-05 PROCEDURE — 84443 ASSAY THYROID STIM HORMONE: CPT | Performed by: NURSE PRACTITIONER

## 2024-07-05 PROCEDURE — 84481 FREE ASSAY (FT-3): CPT | Performed by: NURSE PRACTITIONER

## 2024-07-05 PROCEDURE — 83036 HEMOGLOBIN GLYCOSYLATED A1C: CPT | Performed by: NURSE PRACTITIONER

## 2024-07-05 PROCEDURE — 36415 COLL VENOUS BLD VENIPUNCTURE: CPT | Performed by: NURSE PRACTITIONER

## 2024-07-05 PROCEDURE — 85027 COMPLETE CBC AUTOMATED: CPT | Performed by: NURSE PRACTITIONER

## 2024-07-05 PROCEDURE — 84439 ASSAY OF FREE THYROXINE: CPT | Performed by: NURSE PRACTITIONER

## 2024-07-05 RX ORDER — NITROFURANTOIN 25; 75 MG/1; MG/1
100 CAPSULE ORAL 2 TIMES DAILY
COMMUNITY
Start: 2024-06-24 | End: 2024-07-05

## 2024-07-05 RX ORDER — ALBUTEROL SULFATE 90 UG/1
2 AEROSOL, METERED RESPIRATORY (INHALATION) EVERY 4 HOURS PRN
Qty: 8 G | Refills: 2 | Status: SHIPPED | OUTPATIENT
Start: 2024-07-05

## 2024-07-05 NOTE — ASSESSMENT & PLAN NOTE
At goal of less than 140/90 today but patient has not taken any medication  I recommended she take medication as prescribed upon returning

## 2024-07-05 NOTE — PROGRESS NOTES
"Chief Complaint  Follow-up (3 month f/u)    Subjective        Radha Barbour presents to [facility]  History of Present Illness    Patient has hypertension, prescribed 0.2 mg of clonidine daily, Norvasc 5 mg daily and ramipril 5 mg daily. She has not had medication today. Patient denies headaches,  vision changes, chest pain, edema. Patient has chronic dizziness, takes Meclizine PRN. She reported previously that vestibular therapy helped initially but then worsened symptoms. Patient has not had any falls in the last month.     Patient has type 2 diabetes, taking Metformin 1 g daily.  Last A1c was 6.9. Patient is not monitoring glucose levels.     She is taking Levothyroxine 125 mcg daily for hypothyroidism.    Hx COPD, prescribed Symbicort 2 puffs BID - only using PRN. Patient has intermittent exertional dyspnea, using albuterol PRN.     Patient is taking Wellbutrin 150 mg daily and Effexor 150 mg daily for depression and anxiety, symptoms are stable.      RLS, stable on Requip 0.5 mg nightly.     Patient evaluated by Urology regarding incontinence and increased PVR. She has a camacho cath - plans to discuss interstim check.     Hx cognitive impairment, taking Namenda 10 mg BID. She is followed by Dr. Seipel.     Objective   Vital Signs:  /77 (BP Location: Left arm, Patient Position: Sitting, Cuff Size: Adult)   Pulse 66   Temp 98.4 °F (36.9 °C) (Temporal)   Ht 165.1 cm (65\")   Wt 78.9 kg (174 lb)   SpO2 93%   BMI 28.96 kg/m²   Estimated body mass index is 28.96 kg/m² as calculated from the following:    Height as of this encounter: 165.1 cm (65\").    Weight as of this encounter: 78.9 kg (174 lb).       BMI is >= 25 and <30. (Overweight) The following options were offered after discussion;: exercise counseling/recommendations and nutrition counseling/recommendations      Physical Exam  Constitutional:       Appearance: Normal appearance.   HENT:      Head: Normocephalic. "   Cardiovascular:      Rate and Rhythm: Normal rate and regular rhythm.   Pulmonary:      Effort: Pulmonary effort is normal.      Breath sounds: Normal breath sounds.   Abdominal:      General: Abdomen is flat. Bowel sounds are normal.      Palpations: Abdomen is soft.   Musculoskeletal:         General: Normal range of motion.      Cervical back: Neck supple.      Right lower leg: No edema.      Left lower leg: No edema.   Skin:     General: Skin is warm and dry.   Neurological:      Mental Status: She is alert and oriented to person, place, and time.      Gait: Gait is intact.   Psychiatric:         Attention and Perception: Attention normal.         Mood and Affect: Mood normal.         Speech: Speech normal.        Result Review :      CMP          9/19/2023    12:10 12/20/2023    12:06 3/26/2024    15:24   CMP   Glucose 157  120  123    BUN 18  11  16    Creatinine 0.98  0.92  1.09    EGFR 61.1  65.5  53.4    Sodium 142  141  143    Potassium 4.5  3.5  4.8    Chloride 101  100  102    Calcium 9.8  8.9  9.4    Total Protein 7.6      Albumin 4.7      Globulin 2.9      Total Bilirubin 0.3      Alkaline Phosphatase 75      AST (SGOT) 13      ALT (SGPT) 8      Albumin/Globulin Ratio 1.6      BUN/Creatinine Ratio 18.4  12.0  14.7    Anion Gap 12.5  13.4  12.0      CBC          9/19/2023    12:10 12/20/2023    12:06 3/26/2024    15:24   CBC   WBC 9.11  8.75  7.66    RBC 3.95  3.83  4.01    Hemoglobin 11.6  11.6  11.9    Hematocrit 34.9  34.1  36.4    MCV 88.4  89.0  90.8    MCH 29.4  30.3  29.7    MCHC 33.2  34.0  32.7    RDW 12.5  12.9  12.3    Platelets 324  304  295        TSH          9/19/2023    12:10 12/20/2023    12:06 3/26/2024    15:24   TSH   TSH 1.420  3.530  0.813      Most Recent A1C          3/26/2024    15:24   HGBA1C Most Recent   Hemoglobin A1C 6.90                   Assessment and Plan     Diagnoses and all orders for this visit:    1. Encounter for screening for osteoporosis (Primary)  -     DEXA  Bone Density Axial; Future    2. Asymptomatic menopause  -     DEXA Bone Density Axial; Future    3. Cognitive impairment  Assessment & Plan:  Continue Namenda as prescribed  Followed by neurology      4. Anxiety  Assessment & Plan:  Stable on Wellbutrin and Effexor, continue as prescribed      5. Type 2 diabetes mellitus with other specified complication, without long-term current use of insulin  Assessment & Plan:  Labs today  ADA diet  Continue metformin as prescribed    Orders:  -     CBC (No Diff)  -     Comprehensive Metabolic Panel  -     Hemoglobin A1c    6. Acquired hypothyroidism  Assessment & Plan:  Continue Synthroid as prescribed    Orders:  -     TSH  -     T4, Free  -     T3, Free    7. Primary hypertension  Assessment & Plan:  At goal of less than 140/90 today but patient has not taken any medication  I recommended she take medication as prescribed upon returning      8. Chronic obstructive pulmonary disease, unspecified COPD type  Assessment & Plan:  Recommended routine use of Symbicort  Albuterol as needed, refill sent to pharmacy        9. Preventative health care  -     Lipid Panel    10. Restless leg syndrome  Assessment & Plan:  Stable on Requip 0.5 mg nightly      11. Dizziness  Assessment & Plan:  Intermittent, stable  Use meclizine as needed      12. OAB (overactive bladder)  Assessment & Plan:  Reviewed urology visit note  Dobbs catheter in place, follow-up as instructed      Other orders  -     albuterol sulfate  (90 Base) MCG/ACT inhaler; Inhale 2 puffs Every 4 (Four) Hours As Needed for Wheezing or Shortness of Air.  Dispense: 8 g; Refill: 2           I spent 30 minutes caring for Radha on this date of service. This time includes time spent by me in the following activities:preparing for the visit, reviewing tests, obtaining and/or reviewing a separately obtained history, performing a medically appropriate examination and/or evaluation , counseling and educating the  patient/family/caregiver, ordering medications, tests, or procedures, documenting information in the medical record, and care coordination  Follow Up     Return in about 4 months (around 11/5/2024) for DMII.  Patient was given instructions and counseling regarding her condition or for health maintenance advice. Please see specific information pulled into the AVS if appropriate.

## 2024-07-06 LAB
ALBUMIN SERPL-MCNC: 4.4 G/DL (ref 3.5–5.2)
ALBUMIN/GLOB SERPL: 1.5 G/DL
ALP SERPL-CCNC: 84 U/L (ref 39–117)
ALT SERPL W P-5'-P-CCNC: 7 U/L (ref 1–33)
ANION GAP SERPL CALCULATED.3IONS-SCNC: 10 MMOL/L (ref 5–15)
AST SERPL-CCNC: 12 U/L (ref 1–32)
BILIRUB SERPL-MCNC: 0.2 MG/DL (ref 0–1.2)
BUN SERPL-MCNC: 22 MG/DL (ref 8–23)
BUN/CREAT SERPL: 22.2 (ref 7–25)
CALCIUM SPEC-SCNC: 9.4 MG/DL (ref 8.6–10.5)
CHLORIDE SERPL-SCNC: 100 MMOL/L (ref 98–107)
CHOLEST SERPL-MCNC: 175 MG/DL (ref 0–200)
CO2 SERPL-SCNC: 28 MMOL/L (ref 22–29)
CREAT SERPL-MCNC: 0.99 MG/DL (ref 0.57–1)
EGFRCR SERPLBLD CKD-EPI 2021: 60 ML/MIN/1.73
GLOBULIN UR ELPH-MCNC: 3 GM/DL
GLUCOSE SERPL-MCNC: 95 MG/DL (ref 65–99)
HDLC SERPL-MCNC: 95 MG/DL (ref 40–60)
LDLC SERPL CALC-MCNC: 59 MG/DL (ref 0–100)
LDLC/HDLC SERPL: 0.58 {RATIO}
POTASSIUM SERPL-SCNC: 5.1 MMOL/L (ref 3.5–5.2)
PROT SERPL-MCNC: 7.4 G/DL (ref 6–8.5)
SODIUM SERPL-SCNC: 138 MMOL/L (ref 136–145)
T3FREE SERPL-MCNC: 2.33 PG/ML (ref 2–4.4)
T4 FREE SERPL-MCNC: 1.5 NG/DL (ref 0.92–1.68)
TRIGL SERPL-MCNC: 126 MG/DL (ref 0–150)
TSH SERPL DL<=0.05 MIU/L-ACNC: 0.74 UIU/ML (ref 0.27–4.2)
VLDLC SERPL-MCNC: 21 MG/DL (ref 5–40)

## 2024-07-08 RX ORDER — PRAVASTATIN SODIUM 20 MG
20 TABLET ORAL DAILY
Qty: 90 TABLET | Refills: 1 | Status: SHIPPED | OUTPATIENT
Start: 2024-07-08

## 2024-07-11 DIAGNOSIS — R41.89 COGNITIVE IMPAIRMENT: ICD-10-CM

## 2024-07-11 RX ORDER — CLONIDINE HYDROCHLORIDE 0.2 MG/1
0.2 TABLET ORAL DAILY
Qty: 90 TABLET | Refills: 1 | Status: SHIPPED | OUTPATIENT
Start: 2024-07-11

## 2024-07-11 RX ORDER — BUDESONIDE AND FORMOTEROL FUMARATE DIHYDRATE 160; 4.5 UG/1; UG/1
2 AEROSOL RESPIRATORY (INHALATION) 2 TIMES DAILY
Qty: 10.2 G | Refills: 2 | Status: SHIPPED | OUTPATIENT
Start: 2024-07-11

## 2024-07-11 RX ORDER — HYDROXYZINE HYDROCHLORIDE 10 MG/1
10 TABLET, FILM COATED ORAL DAILY
Qty: 90 TABLET | Refills: 1 | Status: SHIPPED | OUTPATIENT
Start: 2024-07-11

## 2024-07-11 RX ORDER — DONEPEZIL HYDROCHLORIDE 5 MG/1
5 TABLET, FILM COATED ORAL NIGHTLY
Qty: 90 TABLET | Refills: 0 | OUTPATIENT
Start: 2024-07-11

## 2024-07-11 RX ORDER — OMEPRAZOLE 40 MG/1
40 CAPSULE, DELAYED RELEASE ORAL DAILY
Qty: 90 CAPSULE | Refills: 1 | Status: SHIPPED | OUTPATIENT
Start: 2024-07-11

## 2024-07-11 RX ORDER — GABAPENTIN 300 MG/1
300 CAPSULE ORAL 3 TIMES DAILY
Qty: 270 CAPSULE | Refills: 0 | Status: SHIPPED | OUTPATIENT
Start: 2024-07-11

## 2024-07-11 RX ORDER — ROPINIROLE 0.5 MG/1
TABLET, FILM COATED ORAL
Qty: 90 TABLET | Refills: 1 | Status: SHIPPED | OUTPATIENT
Start: 2024-07-11

## 2024-07-15 DIAGNOSIS — F41.9 ANXIETY: ICD-10-CM

## 2024-07-15 DIAGNOSIS — R41.89 COGNITIVE IMPAIRMENT: ICD-10-CM

## 2024-07-15 RX ORDER — DONEPEZIL HYDROCHLORIDE 5 MG/1
5 TABLET, FILM COATED ORAL NIGHTLY
Qty: 90 TABLET | Refills: 0 | OUTPATIENT
Start: 2024-07-15

## 2024-07-16 RX ORDER — VENLAFAXINE HYDROCHLORIDE 150 MG/1
150 CAPSULE, EXTENDED RELEASE ORAL DAILY
Qty: 180 CAPSULE | Refills: 1 | Status: SHIPPED | OUTPATIENT
Start: 2024-07-16

## 2024-07-16 RX ORDER — BUPROPION HYDROCHLORIDE 100 MG/1
100 TABLET ORAL 2 TIMES DAILY
Qty: 180 TABLET | Refills: 1 | Status: SHIPPED | OUTPATIENT
Start: 2024-07-16

## 2024-07-16 NOTE — TELEPHONE ENCOUNTER
I will refill but I would like her to try backing off to once daily to see if depression and anxiety are controlled with only 1 dose.  We can adjust accordingly, I just prefer to control with the lowest tolerable dose

## 2024-09-10 DIAGNOSIS — R41.89 COGNITIVE IMPAIRMENT: ICD-10-CM

## 2024-09-11 RX ORDER — DONEPEZIL HYDROCHLORIDE 5 MG/1
5 TABLET, FILM COATED ORAL NIGHTLY
Qty: 90 TABLET | Refills: 0 | OUTPATIENT
Start: 2024-09-11

## 2024-10-07 RX ORDER — CLOPIDOGREL BISULFATE 75 MG/1
75 TABLET ORAL DAILY
Qty: 30 TABLET | Refills: 3 | Status: SHIPPED | OUTPATIENT
Start: 2024-10-07

## 2024-10-16 RX ORDER — BUDESONIDE AND FORMOTEROL FUMARATE DIHYDRATE 160; 4.5 UG/1; UG/1
2 AEROSOL RESPIRATORY (INHALATION) 2 TIMES DAILY
Qty: 10.2 G | Refills: 2 | Status: SHIPPED | OUTPATIENT
Start: 2024-10-16

## 2024-10-24 ENCOUNTER — EXTERNAL PBMM DATA (OUTPATIENT)
Dept: PHARMACY | Facility: OTHER | Age: 74
End: 2024-10-24
Payer: MEDICARE

## 2024-10-31 ENCOUNTER — TELEPHONE (OUTPATIENT)
Dept: FAMILY MEDICINE CLINIC | Facility: CLINIC | Age: 74
End: 2024-10-31
Payer: MEDICARE

## 2024-10-31 RX ORDER — HYDROXYZINE HYDROCHLORIDE 10 MG/1
10 TABLET, FILM COATED ORAL DAILY
Qty: 90 TABLET | Refills: 1 | Status: SHIPPED | OUTPATIENT
Start: 2024-10-31

## 2024-10-31 RX ORDER — RAMIPRIL 2.5 MG/1
5 CAPSULE ORAL DAILY
Qty: 180 CAPSULE | Refills: 1 | Status: SHIPPED | OUTPATIENT
Start: 2024-10-31

## 2024-10-31 RX ORDER — MEMANTINE HYDROCHLORIDE 10 MG/1
10 TABLET ORAL 2 TIMES DAILY
Qty: 180 TABLET | Refills: 1 | Status: SHIPPED | OUTPATIENT
Start: 2024-10-31

## 2024-10-31 RX ORDER — PRAVASTATIN SODIUM 20 MG
20 TABLET ORAL DAILY
Qty: 90 TABLET | Refills: 1 | Status: SHIPPED | OUTPATIENT
Start: 2024-10-31

## 2024-10-31 NOTE — TELEPHONE ENCOUNTER
Spoke with daughter. Has not completed bone dexa scan and requested to have scheduled at [facility]. Patient scheduled for 11/05 arrive at 2:15. Hold calcium/multivitamins 48 hours. Daughter notified.

## 2024-11-05 ENCOUNTER — CLINICAL SUPPORT (OUTPATIENT)
Dept: FAMILY MEDICINE CLINIC | Facility: CLINIC | Age: 74
End: 2024-11-05
Payer: MEDICARE

## 2024-11-05 ENCOUNTER — TELEPHONE (OUTPATIENT)
Dept: FAMILY MEDICINE CLINIC | Facility: CLINIC | Age: 74
End: 2024-11-05

## 2024-11-05 DIAGNOSIS — R30.0 DYSURIA: Primary | ICD-10-CM

## 2024-11-05 PROCEDURE — 87077 CULTURE AEROBIC IDENTIFY: CPT | Performed by: NURSE PRACTITIONER

## 2024-11-05 PROCEDURE — 87086 URINE CULTURE/COLONY COUNT: CPT | Performed by: NURSE PRACTITIONER

## 2024-11-05 PROCEDURE — 81002 URINALYSIS NONAUTO W/O SCOPE: CPT | Performed by: NURSE PRACTITIONER

## 2024-11-05 PROCEDURE — 87186 SC STD MICRODIL/AGAR DIL: CPT | Performed by: NURSE PRACTITIONER

## 2024-11-05 NOTE — TELEPHONE ENCOUNTER
Pt came in to leave a urine sample because she is having pain with urination, incontinence and a strong odor to the urine. Urine dipped and sent to you in another message. Urine also sent for culture.

## 2024-11-05 NOTE — PROGRESS NOTES
Patient came in for a urine sample due to UTI symptoms. Urine collected and results sent to provider for review.    MABEL Pinon

## 2024-11-06 RX ORDER — NITROFURANTOIN 25; 75 MG/1; MG/1
100 CAPSULE ORAL 2 TIMES DAILY
Qty: 14 CAPSULE | Refills: 0 | Status: SHIPPED | OUTPATIENT
Start: 2024-11-06

## 2024-11-06 NOTE — TELEPHONE ENCOUNTER
Sending Macrobid to the pharmacy to treat urinary symptoms.  Urine has reflexed to a culture which is pending

## 2024-11-07 ENCOUNTER — OFFICE VISIT (OUTPATIENT)
Dept: FAMILY MEDICINE CLINIC | Facility: CLINIC | Age: 74
End: 2024-11-07
Payer: MEDICARE

## 2024-11-07 VITALS
HEIGHT: 65 IN | OXYGEN SATURATION: 96 % | SYSTOLIC BLOOD PRESSURE: 118 MMHG | WEIGHT: 179 LBS | BODY MASS INDEX: 29.82 KG/M2 | HEART RATE: 71 BPM | DIASTOLIC BLOOD PRESSURE: 64 MMHG

## 2024-11-07 DIAGNOSIS — G25.81 RESTLESS LEG SYNDROME: ICD-10-CM

## 2024-11-07 DIAGNOSIS — J44.9 CHRONIC OBSTRUCTIVE PULMONARY DISEASE, UNSPECIFIED COPD TYPE: ICD-10-CM

## 2024-11-07 DIAGNOSIS — F41.9 ANXIETY AND DEPRESSION: ICD-10-CM

## 2024-11-07 DIAGNOSIS — E11.69 TYPE 2 DIABETES MELLITUS WITH OTHER SPECIFIED COMPLICATION, WITHOUT LONG-TERM CURRENT USE OF INSULIN: ICD-10-CM

## 2024-11-07 DIAGNOSIS — Z23 NEED FOR INFLUENZA VACCINATION: ICD-10-CM

## 2024-11-07 DIAGNOSIS — N32.81 OAB (OVERACTIVE BLADDER): ICD-10-CM

## 2024-11-07 DIAGNOSIS — E03.9 ACQUIRED HYPOTHYROIDISM: ICD-10-CM

## 2024-11-07 DIAGNOSIS — R41.89 COGNITIVE IMPAIRMENT: Primary | ICD-10-CM

## 2024-11-07 DIAGNOSIS — R42 DIZZINESS: ICD-10-CM

## 2024-11-07 DIAGNOSIS — I10 PRIMARY HYPERTENSION: ICD-10-CM

## 2024-11-07 DIAGNOSIS — F32.A ANXIETY AND DEPRESSION: ICD-10-CM

## 2024-11-07 LAB
BACTERIA SPEC AEROBE CULT: ABNORMAL
DEPRECATED RDW RBC AUTO: 36.7 FL (ref 37–54)
ERYTHROCYTE [DISTWIDTH] IN BLOOD BY AUTOMATED COUNT: 11.7 % (ref 12.3–15.4)
HCT VFR BLD AUTO: 35.2 % (ref 34–46.6)
HGB BLD-MCNC: 11.6 G/DL (ref 12–15.9)
MCH RBC QN AUTO: 29.1 PG (ref 26.6–33)
MCHC RBC AUTO-ENTMCNC: 33 G/DL (ref 31.5–35.7)
MCV RBC AUTO: 88.4 FL (ref 79–97)
PLATELET # BLD AUTO: 304 10*3/MM3 (ref 140–450)
PMV BLD AUTO: 9.9 FL (ref 6–12)
RBC # BLD AUTO: 3.98 10*6/MM3 (ref 3.77–5.28)
WBC NRBC COR # BLD AUTO: 8.25 10*3/MM3 (ref 3.4–10.8)

## 2024-11-07 PROCEDURE — 83036 HEMOGLOBIN GLYCOSYLATED A1C: CPT | Performed by: NURSE PRACTITIONER

## 2024-11-07 PROCEDURE — 80053 COMPREHEN METABOLIC PANEL: CPT | Performed by: NURSE PRACTITIONER

## 2024-11-07 PROCEDURE — 36415 COLL VENOUS BLD VENIPUNCTURE: CPT | Performed by: NURSE PRACTITIONER

## 2024-11-07 PROCEDURE — 85027 COMPLETE CBC AUTOMATED: CPT | Performed by: NURSE PRACTITIONER

## 2024-11-07 RX ORDER — MECLIZINE HYDROCHLORIDE 25 MG/1
25 TABLET ORAL 3 TIMES DAILY PRN
Qty: 90 TABLET | Refills: 0 | Status: SHIPPED | OUTPATIENT
Start: 2024-11-07

## 2024-11-07 NOTE — PROGRESS NOTES
"Chief Complaint  Follow-up (4 month follow up DM )    Subjective        Radha Barbour presents to [facility]  History of Present Illness    Patient presents for f/u visit.     Patient has hypertension, prescribed 0.2 mg of clonidine daily, Norvasc 5 mg daily and ramipril 5 mg daily. Blood pressure is stable. Patient denies headaches,  vision changes, chest pain, edema. Patient has chronic dizziness, takes Meclizine PRN. Patient has not had any falls in the last month.      Patient has type 2 diabetes, taking Metformin 1 g daily.  Last A1c was 6.6. Patient is not monitoring glucose levels.      She is taking Levothyroxine 125 mcg daily for hypothyroidism.     Hx COPD, prescribed Symbicort 2 puffs BID - taking as prescribed. Patient has intermittent exertional dyspnea, using albuterol PRN.      Patient is taking Wellbutrin 150 mg daily and Effexor 150 mg daily for depression and anxiety, symptoms are stable.       RLS, stable on Requip 0.5 mg nightly.      Patient evaluated by Urology regarding incontinence and increased PVR. She has had interstim placement since last visit - still having urinary sx. Patient currently on Macrobid for UTI. She will see Urology on 11/21.      Hx cognitive impairment, taking Namenda 10 mg BID. She is followed by Dr. Seipel.     Objective   Vital Signs:  /64 (BP Location: Left arm, Patient Position: Sitting, Cuff Size: Adult)   Pulse 71   Ht 165.1 cm (65\")   Wt 81.2 kg (179 lb)   SpO2 96%   BMI 29.79 kg/m²   Estimated body mass index is 29.79 kg/m² as calculated from the following:    Height as of this encounter: 165.1 cm (65\").    Weight as of this encounter: 81.2 kg (179 lb).          Physical Exam  Constitutional:       Appearance: Normal appearance.   HENT:      Head: Normocephalic.   Cardiovascular:      Rate and Rhythm: Normal rate and regular rhythm.   Pulmonary:      Effort: Pulmonary effort is normal.      Breath sounds: Normal breath " sounds.   Abdominal:      General: Abdomen is flat. Bowel sounds are normal.      Palpations: Abdomen is soft.   Musculoskeletal:         General: Normal range of motion.      Cervical back: Neck supple.      Right lower leg: No edema.      Left lower leg: No edema.   Skin:     General: Skin is warm and dry.   Neurological:      Mental Status: She is alert and oriented to person, place, and time.      Gait: Gait is intact.   Psychiatric:         Attention and Perception: Attention normal.         Mood and Affect: Mood normal.         Speech: Speech normal.        Result Review :    CMP          3/26/2024    15:24 7/5/2024    14:38 11/7/2024    14:36   CMP   Glucose 123  95  267    BUN 16  22  17    Creatinine 1.09  0.99  0.93    EGFR 53.4  60.0  64.6    Sodium 143  138  139    Potassium 4.8  5.1  3.9    Chloride 102  100  101    Calcium 9.4  9.4  8.7    Total Protein  7.4  6.7    Albumin  4.4  4.1    Globulin  3.0  2.6    Total Bilirubin  0.2  <0.2    Alkaline Phosphatase  84  91    AST (SGOT)  12  13    ALT (SGPT)  7  10    Albumin/Globulin Ratio  1.5  1.6    BUN/Creatinine Ratio 14.7  22.2  18.3    Anion Gap 12.0  10.0  11.0      CBC          3/26/2024    15:24 7/5/2024    14:38 11/7/2024    14:36   CBC   WBC 7.66  8.81  8.25    RBC 4.01  3.87  3.98    Hemoglobin 11.9  11.4  11.6    Hematocrit 36.4  34.9  35.2    MCV 90.8  90.2  88.4    MCH 29.7  29.5  29.1    MCHC 32.7  32.7  33.0    RDW 12.3  13.5  11.7    Platelets 295  301  304      Lipid Panel          7/5/2024    14:38   Lipid Panel   Total Cholesterol 175    Triglycerides 126    HDL Cholesterol 95    VLDL Cholesterol 21    LDL Cholesterol  59    LDL/HDL Ratio 0.58      TSH          12/20/2023    12:06 3/26/2024    15:24 7/5/2024    14:38   TSH   TSH 3.530  0.813  0.741      Most Recent A1C          11/7/2024    14:36   HGBA1C Most Recent   Hemoglobin A1C 6.90                Assessment and Plan   Diagnoses and all orders for this visit:    1. Cognitive  impairment (Primary)  Assessment & Plan:  Stable on Namenda 10 mg BID  Follow up with Neurology      2. Primary hypertension  Assessment & Plan:  Hypertension is stable and controlled  Continue current treatment regimen.  Blood pressure will be reassessed in 6 months.      3. Acquired hypothyroidism  Assessment & Plan:  Stable on Synthroid 125 mcg daily      4. Type 2 diabetes mellitus with other specified complication, without long-term current use of insulin  Assessment & Plan:  Diabetes is stable.   Continue current treatment regimen.  Recommended an ADA diet.  Regular aerobic exercise.  Reminded to get yearly retinal exam.  Diabetes will be reassessed in 6 months    Orders:  -     CBC (No Diff)  -     Comprehensive Metabolic Panel  -     Hemoglobin A1c  -     Cancel: MicroAlbumin, Urine, Random - Urine, Clean Catch    5. Chronic obstructive pulmonary disease, unspecified COPD type  Assessment & Plan:  COPD is stable.    Plan:  Continue same medication/s without change.    Discussed medication dosage, use, side effects, and goals of treatment in detail.    Warning signs of respiratory distress were reviewed with the patient. .    Patient Treatment Goals:   symptom prevention, minimizing limitation in activity, prevention of exacerbations and use of ER/inpatient care, maintenance of optimal pulmonary function, and minimization of adverse effects of treatment    Followup in 6 months.      6. Restless leg syndrome  Assessment & Plan:  Stable on Requip 0.5 mg nightly      7. OAB (overactive bladder)  Assessment & Plan:  Follow up with Urology      8. Dizziness  Assessment & Plan:  Intermittent  Meclizine PRN    Orders:  -     meclizine (ANTIVERT) 25 MG tablet; Take 1 tablet by mouth 3 (Three) Times a Day As Needed for Dizziness.  Dispense: 90 tablet; Refill: 0    9. Anxiety and depression  Assessment & Plan:  Patient's depression is a recurrent episode that is mild without psychosis. Depression is in partial  remission and stable.    Plan:   Continue current medication therapy     Followup in 6 months.       10. Need for influenza vaccination  -     Fluzone High-Dose 65+yrs (0793-6013)           I spent 30 minutes caring for Radha on this date of service. This time includes time spent by me in the following activities:preparing for the visit, reviewing tests, obtaining and/or reviewing a separately obtained history, performing a medically appropriate examination and/or evaluation , counseling and educating the patient/family/caregiver, ordering medications, tests, or procedures, documenting information in the medical record, and care coordination  Follow Up   Return in about 4 months (around 3/7/2025) for DMII.  Patient was given instructions and counseling regarding her condition or for health maintenance advice. Please see specific information pulled into the AVS if appropriate.

## 2024-11-07 NOTE — PROGRESS NOTES
Venipuncture Blood Specimen Collection  Venipuncture performed in the right arm by Sachi Velásquez MA with good hemostasis. Patient tolerated the procedure well without complications.   11/07/24   Sachi Velásquez MA

## 2024-11-08 LAB
ALBUMIN SERPL-MCNC: 4.1 G/DL (ref 3.5–5.2)
ALBUMIN/GLOB SERPL: 1.6 G/DL
ALP SERPL-CCNC: 91 U/L (ref 39–117)
ALT SERPL W P-5'-P-CCNC: 10 U/L (ref 1–33)
ANION GAP SERPL CALCULATED.3IONS-SCNC: 11 MMOL/L (ref 5–15)
AST SERPL-CCNC: 13 U/L (ref 1–32)
BILIRUB SERPL-MCNC: <0.2 MG/DL (ref 0–1.2)
BUN SERPL-MCNC: 17 MG/DL (ref 8–23)
BUN/CREAT SERPL: 18.3 (ref 7–25)
CALCIUM SPEC-SCNC: 8.7 MG/DL (ref 8.6–10.5)
CHLORIDE SERPL-SCNC: 101 MMOL/L (ref 98–107)
CO2 SERPL-SCNC: 27 MMOL/L (ref 22–29)
CREAT SERPL-MCNC: 0.93 MG/DL (ref 0.57–1)
EGFRCR SERPLBLD CKD-EPI 2021: 64.6 ML/MIN/1.73
GLOBULIN UR ELPH-MCNC: 2.6 GM/DL
GLUCOSE SERPL-MCNC: 267 MG/DL (ref 65–99)
HBA1C MFR BLD: 6.9 % (ref 4.8–5.6)
POTASSIUM SERPL-SCNC: 3.9 MMOL/L (ref 3.5–5.2)
PROT SERPL-MCNC: 6.7 G/DL (ref 6–8.5)
SODIUM SERPL-SCNC: 139 MMOL/L (ref 136–145)

## 2024-11-08 NOTE — ASSESSMENT & PLAN NOTE
COPD is stable.    Plan:  Continue same medication/s without change.    Discussed medication dosage, use, side effects, and goals of treatment in detail.    Warning signs of respiratory distress were reviewed with the patient. .    Patient Treatment Goals:   symptom prevention, minimizing limitation in activity, prevention of exacerbations and use of ER/inpatient care, maintenance of optimal pulmonary function, and minimization of adverse effects of treatment    Followup in 6 months.

## 2024-11-19 ENCOUNTER — TELEPHONE (OUTPATIENT)
Dept: FAMILY MEDICINE CLINIC | Facility: CLINIC | Age: 74
End: 2024-11-19
Payer: MEDICARE

## 2024-11-19 NOTE — TELEPHONE ENCOUNTER
LVM on daughter Alma phone regarding bone dexa scan order. Inquiring if has been scheduled or has been completed    Relay

## 2024-11-27 ENCOUNTER — PATIENT OUTREACH (OUTPATIENT)
Dept: FAMILY MEDICINE CLINIC | Facility: CLINIC | Age: 74
End: 2024-11-27
Payer: MEDICARE

## 2024-11-27 NOTE — OUTREACH NOTE
"Left voice message stating patient is going to be due for an annual wellness visit after 03/26/2025  Needing to ask if can reschedule current appointment on 03/11/2025 to a medicare wellness which will push it back to April 2025.    Relay     \"Please reschedule current 03/11/2025 to a sub medicare wellness with daughter's permission\"   "

## 2024-12-02 ENCOUNTER — POP HEALTH PHARMACY (OUTPATIENT)
Dept: PHARMACY | Facility: OTHER | Age: 74
End: 2024-12-02
Payer: MEDICARE

## 2025-01-09 ENCOUNTER — OFFICE VISIT (OUTPATIENT)
Dept: FAMILY MEDICINE CLINIC | Facility: CLINIC | Age: 75
End: 2025-01-09
Payer: MEDICARE

## 2025-01-09 VITALS
BODY MASS INDEX: 30.39 KG/M2 | OXYGEN SATURATION: 94 % | WEIGHT: 182.4 LBS | DIASTOLIC BLOOD PRESSURE: 76 MMHG | HEIGHT: 65 IN | HEART RATE: 63 BPM | SYSTOLIC BLOOD PRESSURE: 171 MMHG
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
## 2025-01-09 DIAGNOSIS — Z87.440 HISTORY OF UTI: ICD-10-CM

## 2025-01-09 DIAGNOSIS — R26.0 ATAXIC GAIT: ICD-10-CM

## 2025-01-09 DIAGNOSIS — R30.0 DYSURIA: ICD-10-CM

## 2025-01-09 DIAGNOSIS — S62.346A NONDISPLACED FRACTURE OF BASE OF FIFTH METACARPAL BONE, RIGHT HAND, INITIAL ENCOUNTER FOR CLOSED FRACTURE: Primary | ICD-10-CM

## 2025-01-09 LAB
BILIRUB BLD-MCNC: NEGATIVE MG/DL
CLARITY, POC: CLEAR
COLOR UR: YELLOW
GLUCOSE UR STRIP-MCNC: NEGATIVE MG/DL
KETONES UR QL: NEGATIVE
LEUKOCYTE EST, POC: NEGATIVE
NITRITE UR-MCNC: NEGATIVE MG/ML
PH UR: 5.5 [PH] (ref 5–8)
PROT UR STRIP-MCNC: NEGATIVE MG/DL
RBC # UR STRIP: ABNORMAL /UL
SP GR UR: 1.03 (ref 1–1.03)
UROBILINOGEN UR QL: ABNORMAL

## 2025-01-09 PROCEDURE — 87086 URINE CULTURE/COLONY COUNT: CPT | Performed by: NURSE PRACTITIONER

## 2025-01-09 PROCEDURE — 87088 URINE BACTERIA CULTURE: CPT | Performed by: NURSE PRACTITIONER

## 2025-01-09 PROCEDURE — 87186 SC STD MICRODIL/AGAR DIL: CPT | Performed by: NURSE PRACTITIONER

## 2025-01-09 NOTE — PROGRESS NOTES
Chief Complaint  Chief Complaint   Patient presents with    Hospital Follow Up Visit     Pt fell at home in closet 12/31/24, went to [facility], was dx with fracture 5th finger right hand, and UTI. Pt has not had repeat xray of hand or referral to ortho. Pt has completed antibiotics for UTI. Will recheck urine today.            Subjective          Radha Barbour presents to [facility] for   History of Present Illness    74-year-old female patient with past medical history of age related cognitive impairment, hypertension, hypothyroidism, DMII, COPD, RLS, OAB, anxiety/depression presents for ED follow-up following a fall on 12/31/24. She presented to [facility] for evaluation.  Diagnosed with UTI and right fifth metacarpal fracture.  She was treated with Keflex, the right hand was splinted/immobilized and she was discharged home on the same day.    -Right hand x-ray shows a subtle irregularity at the base of the fifth metacarpal suggesting fracture, age-indeterminate  -CT of the head shows no acute process  -X-ray of the left shoulder, no acute fracture or AC joint separation, osteoarthritis  -Cervical spine CT there is moderate degenerative disc changes at C5-6 and C6-7, no evidence of acute fracture  -Urine + UTI    Labs normal except:  WBC 15.8  Glucose 257  BUN 26  Creatinine 1.4      Today she reports she is having some mild pain in the right hand but improving, taking ibuprofen as needed.  She completed the course of Keflex, urine dip today is negative with a pH of 5.5.  She does report she is often unsteady on her feet, she has completed home physical therapy multiple times, states it helps at the time but afterwards she does not continue the exercises on her own the way she should.       The following portions of the patient's history were reviewed and updated as appropriate: allergies, current medications, past family history, past medical history, past social history,  past surgical history and problem list.    Past Medical History:   Diagnosis Date    Anxiety     Arthritis 1998    B12 deficiency 2016    Back pain, chronic 2016    Cancer 2009    Colon polyp     Coronary artery disease involving native heart without angina pectoris 2022    Dementia     Depression 2016    Diabetes mellitus 2022    Diabetic peripheral neuropathy 2020    Difficulty walking     DM type 2, goal HbA1c < 7% 2017    Dyslipidemia 10/11/2017    Environmental and seasonal allergies 10/11/2019    Gastric reflux 2017    HTN, goal below 130/80 02/10/2016    Hyperlipidemia     Hypothyroidism 1990    Insomnia 2015    Lumbosacral radiculopathy 2020    Memory loss     Osteoporosis 2020    Restless leg syndrome 2018    Shortness of breath     TIA (transient ischemic attack) 3/28/2024     Past Surgical History:   Procedure Laterality Date    BACK SURGERY      lower    CARDIAC CATHETERIZATION Right 04/15/2022    Procedure: Left Heart Cath;  Surgeon: Laila Alvarez MD;  Location: [facility] CATH INVASIVE LOCATION;  Service: Cardiovascular;  Laterality: Right;    CARDIAC CATHETERIZATION  4/15/2022    COLON SURGERY  2006    REPLACEMENT TOTAL KNEE Left 2002    redone in     REPLACEMENT TOTAL KNEE Right      Family History   Problem Relation Age of Onset    Hypertension Mother     Arthritis Mother     Colon cancer Mother     Thyroid disease Mother     Stroke Mother     Cancer Mother         Colon    Dementia Mother     Heart disease Sister     Heart disease Sister     Dementia Maternal Aunt     Dementia Maternal Aunt      Social History     Tobacco Use    Smoking status: Former     Current packs/day: 0.00     Average packs/day: 2.0 packs/day for 20.0 years (40.0 ttl pk-yrs)     Types: Cigarettes     Start date: 1978     Quit date: 1998     Years since quittin.0    Smokeless tobacco: Never   Substance Use  Topics    Alcohol use: Yes     Comment: socially       Current Outpatient Medications:     albuterol sulfate  (90 Base) MCG/ACT inhaler, Inhale 2 puffs Every 4 (Four) Hours As Needed for Wheezing or Shortness of Air., Disp: 8 g, Rfl: 2    amLODIPine (NORVASC) 5 MG tablet, Take 1 tablet by mouth Daily., Disp: 90 tablet, Rfl: 3    aspirin 81 MG EC tablet, Take 1 tablet by mouth Daily., Disp: , Rfl:     budesonide-formoterol (SYMBICORT) 160-4.5 MCG/ACT inhaler, INHALE 2 PUFFS BY MOUTH TWICE A DAY, Disp: 10.2 g, Rfl: 2    buPROPion (WELLBUTRIN) 100 MG tablet, TAKE 1 TABLET BY MOUTH 2 TIMES A DAY, Disp: 180 tablet, Rfl: 1    CBD (cannabidiol) oral oil, Take 1 drop by mouth Daily As Needed., Disp: , Rfl:     cloNIDine (CATAPRES) 0.2 MG tablet, TAKE 1 TABLET BY MOUTH DAILY, Disp: 90 tablet, Rfl: 1    clopidogrel (PLAVIX) 75 MG tablet, TAKE 1 TABLET BY MOUTH DAILY, Disp: 30 tablet, Rfl: 3    donepezil (ARICEPT) 10 MG tablet, Take 1 tablet by mouth Every Night., Disp: 90 tablet, Rfl: 3    erythromycin (ROMYCIN) 5 MG/GM ophthalmic ointment, , Disp: , Rfl:     gabapentin (NEURONTIN) 300 MG capsule, TAKE 1 CAPSULE BY MOUTH 3 TIMES A DAY, Disp: 270 capsule, Rfl: 0    hydrOXYzine (ATARAX) 10 MG tablet, TAKE 1 TABLET BY MOUTH DAILY, Disp: 90 tablet, Rfl: 1    levothyroxine (SYNTHROID, LEVOTHROID) 125 MCG tablet, Take 1 tablet by mouth Daily., Disp: , Rfl:     meclizine (ANTIVERT) 25 MG tablet, Take 1 tablet by mouth 3 (Three) Times a Day As Needed for Dizziness., Disp: 90 tablet, Rfl: 0    memantine (NAMENDA) 10 MG tablet, TAKE 1 TABLET BY MOUTH 2 TIMES A DAY, Disp: 180 tablet, Rfl: 1    metFORMIN (GLUCOPHAGE) 1000 MG tablet, TAKE 1 TABLET BY MOUTH DAILY WITH BREAKFAST, Disp: 90 tablet, Rfl: 1    omeprazole (priLOSEC) 40 MG capsule, TAKE 1 CAPSULE BY MOUTH DAILY, Disp: 90 capsule, Rfl: 1    pravastatin (PRAVACHOL) 20 MG tablet, TAKE 1 TABLET BY MOUTH DAILY, Disp: 90 tablet, Rfl: 1    ramipril (ALTACE) 2.5 MG capsule, TAKE 2  "CAPSULES BY MOUTH DAILY, Disp: 180 capsule, Rfl: 1    rOPINIRole (REQUIP) 0.5 MG tablet, TAKE 1 TABLET BY MOUTH EVERY NIGHT AT BEDTIME 1 HOUR BEFORE BEDTIME, Disp: 90 tablet, Rfl: 1    traZODone (DESYREL) 50 MG tablet, Take 1 tablet by mouth At Night As Needed for Sleep., Disp: 30 tablet, Rfl: 1    venlafaxine XR (EFFEXOR-XR) 150 MG 24 hr capsule, Take 1 capsule by mouth Daily., Disp: 180 capsule, Rfl: 1    vitamin B-12 (CYANOCOBALAMIN) 1000 MCG tablet, Take 1 tablet by mouth Daily., Disp: , Rfl:     Vitamin D, Cholecalciferol, 50 MCG (2000 UT) capsule, Take 1 capsule by mouth Daily., Disp: , Rfl:     Objective   Vital Signs:   Vitals:    01/09/25 1106   BP: 171/76   BP Location: Left arm   Patient Position: Sitting   Cuff Size: Adult   Pulse: 63   SpO2: 94%   Weight: 82.7 kg (182 lb 6.4 oz)   Height: 165.1 cm (65\")   PainSc:   5   PainLoc: Hand       Body mass index is 30.35 kg/m².    Physical Exam  Constitutional:       General: She is not in acute distress.     Appearance: Normal appearance. She is well-developed. She is not ill-appearing or diaphoretic.      Comments: Here with her daughter    GLENN:      Head: Normocephalic.   Eyes:      Conjunctiva/sclera: Conjunctivae normal.      Pupils: Pupils are equal, round, and reactive to light.   Neck:      Thyroid: No thyromegaly.      Vascular: No JVD.   Cardiovascular:      Rate and Rhythm: Normal rate and regular rhythm.      Heart sounds: Normal heart sounds. No murmur heard.  Pulmonary:      Effort: Pulmonary effort is normal. No respiratory distress.      Breath sounds: Normal breath sounds. No wheezing or rhonchi.   Abdominal:      General: Bowel sounds are normal. There is no distension.      Palpations: Abdomen is soft.      Tenderness: There is no abdominal tenderness.   Musculoskeletal:         General: Signs of injury (R hand immobilized with splint, fingers are not edematous, they are pink and warm with normal sensation) present. No swelling or " tenderness.      Cervical back: Normal range of motion and neck supple. No tenderness.   Lymphadenopathy:      Cervical: No cervical adenopathy.   Skin:     General: Skin is warm and dry.      Coloration: Skin is not jaundiced.      Findings: No erythema or rash.   Neurological:      General: No focal deficit present.      Mental Status: She is alert and oriented to person, place, and time. Mental status is at baseline.      Sensory: No sensory deficit.      Motor: No weakness.      Gait: Gait abnormal.   Psychiatric:         Mood and Affect: Mood normal.         Behavior: Behavior normal.         Thought Content: Thought content normal.         Judgment: Judgment normal.          Result Review :     Office Visit on 01/09/2025   Component Date Value Ref Range Status    Color 01/09/2025 Yellow  Yellow, Straw, Dark Yellow, [place] Final    Clarity, UA 01/09/2025 Clear  Clear Final    Glucose, UA 01/09/2025 Negative  Negative mg/dL Final    Bilirubin 01/09/2025 Negative  Negative Final    Ketones, UA 01/09/2025 Negative  Negative Final    Specific Gravity  01/09/2025 1.030  1.005 - 1.030 Final    Blood, UA 01/09/2025 Trace (A)  Negative Final    pH, Urine 01/09/2025 5.5  5.0 - 8.0 Final    Protein, POC 01/09/2025 Negative  Negative mg/dL Final    Urobilinogen, UA 01/09/2025 0.2 E.U./dL  Normal, 0.2 E.U./dL Final    Leukocytes 01/09/2025 Negative  Negative Final    Nitrite, UA 01/09/2025 Negative  Negative Final    Urine Culture 01/09/2025 50,000 CFU/mL Escherichia coli (A)   Final                              Assessment and Plan    Diagnoses and all orders for this visit:    1. Nondisplaced fracture of base of fifth metacarpal bone, right hand, initial encounter for closed fracture (Primary)  -     Ambulatory Referral to Hand Surgery    2. History of UTI  -     POCT urinalysis dipstick, manual  -     Urine Culture - Urine, Urine, Clean Catch; Future  -     Urine Culture - Urine, Urine, Clean Catch    3. Accidental fall,  initial encounter  -     Urine Culture - Urine, Urine, Clean Catch; Future  -     Urine Culture - Urine, Urine, Clean Catch    4. Ataxic gait    5. Dysuria  -     Urine Culture - Urine, Urine, Clean Catch; Future  -     Urine Culture - Urine, Urine, Clean Catch      [facility] ER report, labs/imaging reviewed  -2nd UTI since Nov 2024  Urine dip appears negative today, will send ua/cs to confirm.  pH 5.5, recommend increasing water intake  Change brief when wet, wipe front to back  Referral to hand specialist, continue ibuprofen as needed, keep R hand splinted until appt   Recommend referral again to home PT, patient declines. Recommend she resume home exercises provided by PT in the past at least 1-2 times per day      I spent 30 minutes caring for Radha Barbour on this date of service. This time includes time spent by me in the following activities: preparing for the visit, reviewing tests, performing a medically appropriate examination and/or evaluation , counseling and educating the patient/family/caregiver, ordering medications, tests, or procedures and documenting information in the medical record        Follow Up     Return for Next scheduled follow up 3/11/25 or earlier if needed .  Patient was given instructions and counseling regarding her condition or for health maintenance advice. Please see specific information pulled into the AVS if appropriate.        Part of this note may be an electronic transcription/translation of spoken language to printed text using the Dragon Dictation System

## 2025-01-12 LAB — BACTERIA SPEC AEROBE CULT: ABNORMAL

## 2025-01-12 RX ORDER — NITROFURANTOIN 25; 75 MG/1; MG/1
100 CAPSULE ORAL 2 TIMES DAILY
Qty: 14 CAPSULE | Refills: 0 | Status: SHIPPED | OUTPATIENT
Start: 2025-01-12

## 2025-01-27 ENCOUNTER — INPATIENT HOSPITAL (OUTPATIENT)
Dept: URBAN - METROPOLITAN AREA HOSPITAL 76 | Facility: HOSPITAL | Age: 75
End: 2025-01-27
Payer: MEDICARE

## 2025-01-27 DIAGNOSIS — K52.9 NONINFECTIVE GASTROENTERITIS AND COLITIS, UNSPECIFIED: ICD-10-CM

## 2025-01-27 PROCEDURE — 99222 1ST HOSP IP/OBS MODERATE 55: CPT | Performed by: INTERNAL MEDICINE

## 2025-01-28 ENCOUNTER — INPATIENT HOSPITAL (OUTPATIENT)
Dept: URBAN - METROPOLITAN AREA HOSPITAL 76 | Facility: HOSPITAL | Age: 75
End: 2025-01-28
Payer: MEDICARE

## 2025-01-28 DIAGNOSIS — R93.3 ABNORMAL FINDINGS ON DIAGNOSTIC IMAGING OF OTHER PARTS OF DI: ICD-10-CM

## 2025-01-28 DIAGNOSIS — R11.2 NAUSEA WITH VOMITING, UNSPECIFIED: ICD-10-CM

## 2025-01-28 DIAGNOSIS — K52.9 NONINFECTIVE GASTROENTERITIS AND COLITIS, UNSPECIFIED: ICD-10-CM

## 2025-01-28 PROCEDURE — 99232 SBSQ HOSP IP/OBS MODERATE 35: CPT | Performed by: INTERNAL MEDICINE

## 2025-01-29 ENCOUNTER — INPATIENT HOSPITAL (OUTPATIENT)
Dept: URBAN - METROPOLITAN AREA HOSPITAL 76 | Facility: HOSPITAL | Age: 75
End: 2025-01-29
Payer: MEDICARE

## 2025-01-29 DIAGNOSIS — R93.3 ABNORMAL FINDINGS ON DIAGNOSTIC IMAGING OF OTHER PARTS OF DI: ICD-10-CM

## 2025-01-29 DIAGNOSIS — K52.9 NONINFECTIVE GASTROENTERITIS AND COLITIS, UNSPECIFIED: ICD-10-CM

## 2025-01-29 PROCEDURE — 45378 DIAGNOSTIC COLONOSCOPY: CPT | Performed by: INTERNAL MEDICINE

## 2025-02-04 RX ORDER — CLOPIDOGREL BISULFATE 75 MG/1
75 TABLET ORAL DAILY
Qty: 30 TABLET | Refills: 3 | Status: SHIPPED | OUTPATIENT
Start: 2025-02-04

## 2025-02-16 ENCOUNTER — APPOINTMENT (OUTPATIENT)
Dept: CT IMAGING | Facility: HOSPITAL | Age: 75
End: 2025-02-16
Payer: MEDICARE

## 2025-02-16 ENCOUNTER — READMISSION MANAGEMENT (OUTPATIENT)
Dept: CALL CENTER | Facility: HOSPITAL | Age: 75
End: 2025-02-16
Payer: MEDICARE

## 2025-02-16 ENCOUNTER — APPOINTMENT (OUTPATIENT)
Dept: GENERAL RADIOLOGY | Facility: HOSPITAL | Age: 75
End: 2025-02-16
Payer: MEDICARE

## 2025-02-16 ENCOUNTER — HOSPITAL ENCOUNTER (INPATIENT)
Facility: HOSPITAL | Age: 75
LOS: 3 days | Discharge: NURSING, REHAB OR LONG-TERM CARE | End: 2025-02-20
Attending: STUDENT IN AN ORGANIZED HEALTH CARE EDUCATION/TRAINING PROGRAM | Admitting: STUDENT IN AN ORGANIZED HEALTH CARE EDUCATION/TRAINING PROGRAM
Payer: MEDICARE

## 2025-02-16 DIAGNOSIS — N28.9 RENAL INSUFFICIENCY, MILD: Primary | ICD-10-CM

## 2025-02-16 DIAGNOSIS — R53.1 WEAKNESS GENERALIZED: ICD-10-CM

## 2025-02-16 DIAGNOSIS — N39.0 UTI (URINARY TRACT INFECTION) WITH PYURIA: ICD-10-CM

## 2025-02-16 LAB
ALBUMIN SERPL-MCNC: 4.3 G/DL (ref 3.5–5.2)
ALBUMIN/GLOB SERPL: 1.2 G/DL
ALP SERPL-CCNC: 87 U/L (ref 39–117)
ALT SERPL W P-5'-P-CCNC: 10 U/L (ref 1–33)
ANION GAP SERPL CALCULATED.3IONS-SCNC: 16.3 MMOL/L (ref 5–15)
AST SERPL-CCNC: 26 U/L (ref 1–32)
B PARAPERT DNA SPEC QL NAA+PROBE: NOT DETECTED
B PERT DNA SPEC QL NAA+PROBE: NOT DETECTED
BACTERIA UR QL AUTO: ABNORMAL /HPF
BASOPHILS # BLD AUTO: 0.15 10*3/MM3 (ref 0–0.2)
BASOPHILS NFR BLD AUTO: 1.3 % (ref 0–1.5)
BILIRUB SERPL-MCNC: 0.3 MG/DL (ref 0–1.2)
BILIRUB UR QL STRIP: NEGATIVE
BUN SERPL-MCNC: 24 MG/DL (ref 8–23)
BUN/CREAT SERPL: 10.2 (ref 7–25)
C PNEUM DNA NPH QL NAA+NON-PROBE: NOT DETECTED
CALCIUM SPEC-SCNC: 9.7 MG/DL (ref 8.6–10.5)
CHLORIDE SERPL-SCNC: 99 MMOL/L (ref 98–107)
CK SERPL-CCNC: 58 U/L (ref 20–180)
CLARITY UR: ABNORMAL
CO2 SERPL-SCNC: 24.7 MMOL/L (ref 22–29)
COLOR UR: YELLOW
CREAT SERPL-MCNC: 2.35 MG/DL (ref 0.57–1)
DEPRECATED RDW RBC AUTO: 47.8 FL (ref 37–54)
EGFRCR SERPLBLD CKD-EPI 2021: 21.1 ML/MIN/1.73
EOSINOPHIL # BLD AUTO: 0.29 10*3/MM3 (ref 0–0.4)
EOSINOPHIL NFR BLD AUTO: 2.6 % (ref 0.3–6.2)
ERYTHROCYTE [DISTWIDTH] IN BLOOD BY AUTOMATED COUNT: 14.2 % (ref 12.3–15.4)
FLUAV SUBTYP SPEC NAA+PROBE: NOT DETECTED
FLUBV RNA ISLT QL NAA+PROBE: NOT DETECTED
GLOBULIN UR ELPH-MCNC: 3.7 GM/DL
GLUCOSE SERPL-MCNC: 125 MG/DL (ref 65–99)
GLUCOSE UR STRIP-MCNC: NEGATIVE MG/DL
HADV DNA SPEC NAA+PROBE: NOT DETECTED
HCOV 229E RNA SPEC QL NAA+PROBE: NOT DETECTED
HCOV HKU1 RNA SPEC QL NAA+PROBE: NOT DETECTED
HCOV NL63 RNA SPEC QL NAA+PROBE: NOT DETECTED
HCOV OC43 RNA SPEC QL NAA+PROBE: NOT DETECTED
HCT VFR BLD AUTO: 46.5 % (ref 34–46.6)
HGB BLD-MCNC: 14.5 G/DL (ref 12–15.9)
HGB UR QL STRIP.AUTO: ABNORMAL
HMPV RNA NPH QL NAA+NON-PROBE: NOT DETECTED
HPIV1 RNA ISLT QL NAA+PROBE: NOT DETECTED
HPIV2 RNA SPEC QL NAA+PROBE: NOT DETECTED
HPIV3 RNA NPH QL NAA+PROBE: NOT DETECTED
HPIV4 P GENE NPH QL NAA+PROBE: NOT DETECTED
HYALINE CASTS UR QL AUTO: ABNORMAL /LPF
IMM GRANULOCYTES # BLD AUTO: 0.25 10*3/MM3 (ref 0–0.05)
IMM GRANULOCYTES NFR BLD AUTO: 2.2 % (ref 0–0.5)
KETONES UR QL STRIP: ABNORMAL
LEUKOCYTE ESTERASE UR QL STRIP.AUTO: ABNORMAL
LYMPHOCYTES # BLD AUTO: 1.73 10*3/MM3 (ref 0.7–3.1)
LYMPHOCYTES NFR BLD AUTO: 15.5 % (ref 19.6–45.3)
M PNEUMO IGG SER IA-ACNC: NOT DETECTED
MCH RBC QN AUTO: 28.8 PG (ref 26.6–33)
MCHC RBC AUTO-ENTMCNC: 31.2 G/DL (ref 31.5–35.7)
MCV RBC AUTO: 92.3 FL (ref 79–97)
MONOCYTES # BLD AUTO: 1.09 10*3/MM3 (ref 0.1–0.9)
MONOCYTES NFR BLD AUTO: 9.8 % (ref 5–12)
NEUTROPHILS NFR BLD AUTO: 68.6 % (ref 42.7–76)
NEUTROPHILS NFR BLD AUTO: 7.64 10*3/MM3 (ref 1.7–7)
NITRITE UR QL STRIP: NEGATIVE
NRBC BLD AUTO-RTO: 0 /100 WBC (ref 0–0.2)
PH UR STRIP.AUTO: <=5 [PH] (ref 5–8)
PLATELET # BLD AUTO: 423 10*3/MM3 (ref 140–450)
PMV BLD AUTO: 9.5 FL (ref 6–12)
POTASSIUM SERPL-SCNC: 3.9 MMOL/L (ref 3.5–5.2)
PROT SERPL-MCNC: 8 G/DL (ref 6–8.5)
PROT UR QL STRIP: ABNORMAL
RBC # BLD AUTO: 5.04 10*6/MM3 (ref 3.77–5.28)
RBC # UR STRIP: ABNORMAL /HPF
REF LAB TEST METHOD: ABNORMAL
RHINOVIRUS RNA SPEC NAA+PROBE: DETECTED
RSV RNA NPH QL NAA+NON-PROBE: NOT DETECTED
SARS-COV-2 RNA RESP QL NAA+PROBE: NOT DETECTED
SODIUM SERPL-SCNC: 140 MMOL/L (ref 136–145)
SP GR UR STRIP: 1.01 (ref 1–1.03)
SQUAMOUS #/AREA URNS HPF: ABNORMAL /HPF
UROBILINOGEN UR QL STRIP: ABNORMAL
WBC # UR STRIP: ABNORMAL /HPF
WBC NRBC COR # BLD AUTO: 11.15 10*3/MM3 (ref 3.4–10.8)

## 2025-02-16 PROCEDURE — 87186 SC STD MICRODIL/AGAR DIL: CPT | Performed by: NURSE PRACTITIONER

## 2025-02-16 PROCEDURE — 87086 URINE CULTURE/COLONY COUNT: CPT | Performed by: NURSE PRACTITIONER

## 2025-02-16 PROCEDURE — 70450 CT HEAD/BRAIN W/O DYE: CPT

## 2025-02-16 PROCEDURE — 99285 EMERGENCY DEPT VISIT HI MDM: CPT

## 2025-02-16 PROCEDURE — 0202U NFCT DS 22 TRGT SARS-COV-2: CPT | Performed by: NURSE PRACTITIONER

## 2025-02-16 PROCEDURE — 25810000003 SODIUM CHLORIDE 0.9 % SOLUTION: Performed by: NURSE PRACTITIONER

## 2025-02-16 PROCEDURE — 83935 ASSAY OF URINE OSMOLALITY: CPT

## 2025-02-16 PROCEDURE — G0378 HOSPITAL OBSERVATION PER HR: HCPCS

## 2025-02-16 PROCEDURE — 94799 UNLISTED PULMONARY SVC/PX: CPT

## 2025-02-16 PROCEDURE — 71045 X-RAY EXAM CHEST 1 VIEW: CPT

## 2025-02-16 PROCEDURE — 84300 ASSAY OF URINE SODIUM: CPT

## 2025-02-16 PROCEDURE — 25010000002 CEFTRIAXONE PER 250 MG: Performed by: NURSE PRACTITIONER

## 2025-02-16 PROCEDURE — 85025 COMPLETE CBC W/AUTO DIFF WBC: CPT | Performed by: NURSE PRACTITIONER

## 2025-02-16 PROCEDURE — 93005 ELECTROCARDIOGRAM TRACING: CPT

## 2025-02-16 PROCEDURE — 81001 URINALYSIS AUTO W/SCOPE: CPT | Performed by: NURSE PRACTITIONER

## 2025-02-16 PROCEDURE — 80053 COMPREHEN METABOLIC PANEL: CPT | Performed by: NURSE PRACTITIONER

## 2025-02-16 PROCEDURE — 87088 URINE BACTERIA CULTURE: CPT | Performed by: NURSE PRACTITIONER

## 2025-02-16 PROCEDURE — 93005 ELECTROCARDIOGRAM TRACING: CPT | Performed by: STUDENT IN AN ORGANIZED HEALTH CARE EDUCATION/TRAINING PROGRAM

## 2025-02-16 PROCEDURE — 82550 ASSAY OF CK (CPK): CPT | Performed by: NURSE PRACTITIONER

## 2025-02-16 PROCEDURE — P9612 CATHETERIZE FOR URINE SPEC: HCPCS

## 2025-02-16 RX ORDER — BISACODYL 5 MG/1
5 TABLET, DELAYED RELEASE ORAL DAILY PRN
Status: DISCONTINUED | OUTPATIENT
Start: 2025-02-16 | End: 2025-02-20 | Stop reason: HOSPADM

## 2025-02-16 RX ORDER — IBUPROFEN 600 MG/1
1 TABLET ORAL
Status: DISCONTINUED | OUTPATIENT
Start: 2025-02-16 | End: 2025-02-20 | Stop reason: HOSPADM

## 2025-02-16 RX ORDER — SODIUM CHLORIDE 0.9 % (FLUSH) 0.9 %
10 SYRINGE (ML) INJECTION AS NEEDED
Status: DISCONTINUED | OUTPATIENT
Start: 2025-02-16 | End: 2025-02-20 | Stop reason: HOSPADM

## 2025-02-16 RX ORDER — BUDESONIDE AND FORMOTEROL FUMARATE DIHYDRATE 160; 4.5 UG/1; UG/1
2 AEROSOL RESPIRATORY (INHALATION)
Status: DISCONTINUED | OUTPATIENT
Start: 2025-02-16 | End: 2025-02-20 | Stop reason: HOSPADM

## 2025-02-16 RX ORDER — SODIUM CHLORIDE 9 MG/ML
100 INJECTION, SOLUTION INTRAVENOUS CONTINUOUS
Status: DISPENSED | OUTPATIENT
Start: 2025-02-16 | End: 2025-02-17

## 2025-02-16 RX ORDER — ALUMINA, MAGNESIA, AND SIMETHICONE 2400; 2400; 240 MG/30ML; MG/30ML; MG/30ML
15 SUSPENSION ORAL EVERY 6 HOURS PRN
Status: DISCONTINUED | OUTPATIENT
Start: 2025-02-16 | End: 2025-02-20 | Stop reason: HOSPADM

## 2025-02-16 RX ORDER — NICOTINE POLACRILEX 4 MG
15 LOZENGE BUCCAL
Status: DISCONTINUED | OUTPATIENT
Start: 2025-02-16 | End: 2025-02-20 | Stop reason: HOSPADM

## 2025-02-16 RX ORDER — DEXTROSE MONOHYDRATE 25 G/50ML
25 INJECTION, SOLUTION INTRAVENOUS
Status: DISCONTINUED | OUTPATIENT
Start: 2025-02-16 | End: 2025-02-20 | Stop reason: HOSPADM

## 2025-02-16 RX ORDER — SODIUM CHLORIDE 0.9 % (FLUSH) 0.9 %
10 SYRINGE (ML) INJECTION EVERY 12 HOURS SCHEDULED
Status: DISCONTINUED | OUTPATIENT
Start: 2025-02-16 | End: 2025-02-20 | Stop reason: HOSPADM

## 2025-02-16 RX ORDER — POLYETHYLENE GLYCOL 3350 17 G/17G
17 POWDER, FOR SOLUTION ORAL DAILY PRN
Status: DISCONTINUED | OUTPATIENT
Start: 2025-02-16 | End: 2025-02-20 | Stop reason: HOSPADM

## 2025-02-16 RX ORDER — INSULIN LISPRO 100 [IU]/ML
2-7 INJECTION, SOLUTION INTRAVENOUS; SUBCUTANEOUS
Status: DISCONTINUED | OUTPATIENT
Start: 2025-02-17 | End: 2025-02-20 | Stop reason: HOSPADM

## 2025-02-16 RX ORDER — IPRATROPIUM BROMIDE AND ALBUTEROL SULFATE 2.5; .5 MG/3ML; MG/3ML
3 SOLUTION RESPIRATORY (INHALATION) EVERY 4 HOURS PRN
Status: DISCONTINUED | OUTPATIENT
Start: 2025-02-16 | End: 2025-02-20 | Stop reason: HOSPADM

## 2025-02-16 RX ORDER — ONDANSETRON 2 MG/ML
4 INJECTION INTRAMUSCULAR; INTRAVENOUS EVERY 6 HOURS PRN
Status: DISCONTINUED | OUTPATIENT
Start: 2025-02-16 | End: 2025-02-20 | Stop reason: HOSPADM

## 2025-02-16 RX ORDER — ONDANSETRON 4 MG/1
4 TABLET, ORALLY DISINTEGRATING ORAL EVERY 6 HOURS PRN
Status: DISCONTINUED | OUTPATIENT
Start: 2025-02-16 | End: 2025-02-20 | Stop reason: HOSPADM

## 2025-02-16 RX ORDER — AMOXICILLIN 250 MG
2 CAPSULE ORAL 2 TIMES DAILY PRN
Status: DISCONTINUED | OUTPATIENT
Start: 2025-02-16 | End: 2025-02-20 | Stop reason: HOSPADM

## 2025-02-16 RX ORDER — BISACODYL 10 MG
10 SUPPOSITORY, RECTAL RECTAL DAILY PRN
Status: DISCONTINUED | OUTPATIENT
Start: 2025-02-16 | End: 2025-02-20 | Stop reason: HOSPADM

## 2025-02-16 RX ORDER — SODIUM CHLORIDE 9 MG/ML
40 INJECTION, SOLUTION INTRAVENOUS AS NEEDED
Status: DISCONTINUED | OUTPATIENT
Start: 2025-02-16 | End: 2025-02-20 | Stop reason: HOSPADM

## 2025-02-16 RX ADMIN — CEFTRIAXONE 2000 MG: 2 INJECTION, POWDER, FOR SOLUTION INTRAMUSCULAR; INTRAVENOUS at 22:10

## 2025-02-16 RX ADMIN — BUDESONIDE AND FORMOTEROL FUMARATE DIHYDRATE 2 PUFF: 160; 4.5 AEROSOL RESPIRATORY (INHALATION) at 23:50

## 2025-02-16 RX ADMIN — SODIUM CHLORIDE 100 ML/HR: 9 INJECTION, SOLUTION INTRAVENOUS at 22:10

## 2025-02-17 ENCOUNTER — APPOINTMENT (OUTPATIENT)
Dept: ULTRASOUND IMAGING | Facility: HOSPITAL | Age: 75
End: 2025-02-17
Payer: MEDICARE

## 2025-02-17 PROBLEM — N17.9 AKI (ACUTE KIDNEY INJURY): Status: ACTIVE | Noted: 2025-02-17

## 2025-02-17 LAB
ANION GAP SERPL CALCULATED.3IONS-SCNC: 14 MMOL/L (ref 5–15)
BASOPHILS # BLD AUTO: 0.11 10*3/MM3 (ref 0–0.2)
BASOPHILS NFR BLD AUTO: 1.1 % (ref 0–1.5)
BUN SERPL-MCNC: 25 MG/DL (ref 8–23)
BUN/CREAT SERPL: 10.5 (ref 7–25)
CALCIUM SPEC-SCNC: 8.9 MG/DL (ref 8.6–10.5)
CHLORIDE SERPL-SCNC: 98 MMOL/L (ref 98–107)
CK SERPL-CCNC: 33 U/L (ref 20–180)
CO2 SERPL-SCNC: 25 MMOL/L (ref 22–29)
CREAT SERPL-MCNC: 2.37 MG/DL (ref 0.57–1)
DEPRECATED RDW RBC AUTO: 48.7 FL (ref 37–54)
EGFRCR SERPLBLD CKD-EPI 2021: 20.9 ML/MIN/1.73
EOSINOPHIL # BLD AUTO: 0.32 10*3/MM3 (ref 0–0.4)
EOSINOPHIL NFR BLD AUTO: 3.2 % (ref 0.3–6.2)
ERYTHROCYTE [DISTWIDTH] IN BLOOD BY AUTOMATED COUNT: 14.4 % (ref 12.3–15.4)
GLUCOSE BLDC GLUCOMTR-MCNC: 134 MG/DL (ref 70–105)
GLUCOSE BLDC GLUCOMTR-MCNC: 167 MG/DL (ref 70–105)
GLUCOSE BLDC GLUCOMTR-MCNC: 169 MG/DL (ref 70–105)
GLUCOSE BLDC GLUCOMTR-MCNC: 191 MG/DL (ref 70–105)
GLUCOSE SERPL-MCNC: 234 MG/DL (ref 65–99)
HCT VFR BLD AUTO: 39.6 % (ref 34–46.6)
HGB BLD-MCNC: 12.4 G/DL (ref 12–15.9)
IMM GRANULOCYTES # BLD AUTO: 0.19 10*3/MM3 (ref 0–0.05)
IMM GRANULOCYTES NFR BLD AUTO: 1.9 % (ref 0–0.5)
LYMPHOCYTES # BLD AUTO: 2.28 10*3/MM3 (ref 0.7–3.1)
LYMPHOCYTES NFR BLD AUTO: 23 % (ref 19.6–45.3)
MCH RBC QN AUTO: 29 PG (ref 26.6–33)
MCHC RBC AUTO-ENTMCNC: 31.3 G/DL (ref 31.5–35.7)
MCV RBC AUTO: 92.7 FL (ref 79–97)
MONOCYTES # BLD AUTO: 0.96 10*3/MM3 (ref 0.1–0.9)
MONOCYTES NFR BLD AUTO: 9.7 % (ref 5–12)
NEUTROPHILS NFR BLD AUTO: 6.04 10*3/MM3 (ref 1.7–7)
NEUTROPHILS NFR BLD AUTO: 61.1 % (ref 42.7–76)
NRBC BLD AUTO-RTO: 0 /100 WBC (ref 0–0.2)
OSMOLALITY UR: 290 MOSM/KG (ref 300–800)
PLATELET # BLD AUTO: 400 10*3/MM3 (ref 140–450)
PMV BLD AUTO: 9.7 FL (ref 6–12)
POTASSIUM SERPL-SCNC: 3.1 MMOL/L (ref 3.5–5.2)
QT INTERVAL: 402 MS
QTC INTERVAL: 444 MS
RBC # BLD AUTO: 4.27 10*6/MM3 (ref 3.77–5.28)
SODIUM SERPL-SCNC: 137 MMOL/L (ref 136–145)
SODIUM UR-SCNC: 54 MMOL/L
URATE SERPL-MCNC: 7.7 MG/DL (ref 2.4–5.7)
WBC NRBC COR # BLD AUTO: 9.9 10*3/MM3 (ref 3.4–10.8)

## 2025-02-17 PROCEDURE — 94799 UNLISTED PULMONARY SVC/PX: CPT

## 2025-02-17 PROCEDURE — G0378 HOSPITAL OBSERVATION PER HR: HCPCS

## 2025-02-17 PROCEDURE — 25810000003 SODIUM CHLORIDE 0.9 % SOLUTION: Performed by: NURSE PRACTITIONER

## 2025-02-17 PROCEDURE — 82948 REAGENT STRIP/BLOOD GLUCOSE: CPT | Performed by: NURSE PRACTITIONER

## 2025-02-17 PROCEDURE — 97166 OT EVAL MOD COMPLEX 45 MIN: CPT

## 2025-02-17 PROCEDURE — 25010000002 CEFTRIAXONE PER 250 MG: Performed by: NURSE PRACTITIONER

## 2025-02-17 PROCEDURE — 85025 COMPLETE CBC W/AUTO DIFF WBC: CPT | Performed by: NURSE PRACTITIONER

## 2025-02-17 PROCEDURE — 25010000002 ENOXAPARIN PER 10 MG

## 2025-02-17 PROCEDURE — 94761 N-INVAS EAR/PLS OXIMETRY MLT: CPT

## 2025-02-17 PROCEDURE — 36415 COLL VENOUS BLD VENIPUNCTURE: CPT

## 2025-02-17 PROCEDURE — 84550 ASSAY OF BLOOD/URIC ACID: CPT

## 2025-02-17 PROCEDURE — 25810000003 SODIUM CHLORIDE 0.9 % SOLUTION

## 2025-02-17 PROCEDURE — 97161 PT EVAL LOW COMPLEX 20 MIN: CPT

## 2025-02-17 PROCEDURE — 76775 US EXAM ABDO BACK WALL LIM: CPT

## 2025-02-17 PROCEDURE — 63710000001 INSULIN LISPRO (HUMAN) PER 5 UNITS: Performed by: NURSE PRACTITIONER

## 2025-02-17 PROCEDURE — 82948 REAGENT STRIP/BLOOD GLUCOSE: CPT

## 2025-02-17 PROCEDURE — 82550 ASSAY OF CK (CPK): CPT

## 2025-02-17 PROCEDURE — 80048 BASIC METABOLIC PNL TOTAL CA: CPT | Performed by: NURSE PRACTITIONER

## 2025-02-17 RX ORDER — ATORVASTATIN CALCIUM 10 MG/1
10 TABLET, FILM COATED ORAL DAILY
Status: DISCONTINUED | OUTPATIENT
Start: 2025-02-17 | End: 2025-02-20 | Stop reason: HOSPADM

## 2025-02-17 RX ORDER — MULTIVITAMIN WITH IRON
1000 TABLET ORAL DAILY
Status: DISCONTINUED | OUTPATIENT
Start: 2025-02-17 | End: 2025-02-20 | Stop reason: HOSPADM

## 2025-02-17 RX ORDER — MECLIZINE HYDROCHLORIDE 25 MG/1
25 TABLET ORAL 3 TIMES DAILY PRN
Status: DISCONTINUED | OUTPATIENT
Start: 2025-02-17 | End: 2025-02-20 | Stop reason: HOSPADM

## 2025-02-17 RX ORDER — CLOPIDOGREL BISULFATE 75 MG/1
75 TABLET ORAL DAILY
Status: DISCONTINUED | OUTPATIENT
Start: 2025-02-17 | End: 2025-02-20 | Stop reason: HOSPADM

## 2025-02-17 RX ORDER — LEVOTHYROXINE SODIUM 100 UG/1
100 TABLET ORAL
Status: DISCONTINUED | OUTPATIENT
Start: 2025-02-17 | End: 2025-02-20 | Stop reason: HOSPADM

## 2025-02-17 RX ORDER — GABAPENTIN 100 MG/1
100 CAPSULE ORAL 3 TIMES DAILY
Status: DISCONTINUED | OUTPATIENT
Start: 2025-02-17 | End: 2025-02-20 | Stop reason: HOSPADM

## 2025-02-17 RX ORDER — CHOLECALCIFEROL (VITAMIN D3) 25 MCG
1000 TABLET ORAL DAILY
Status: DISCONTINUED | OUTPATIENT
Start: 2025-02-17 | End: 2025-02-20 | Stop reason: HOSPADM

## 2025-02-17 RX ORDER — DONEPEZIL HYDROCHLORIDE 5 MG/1
10 TABLET, FILM COATED ORAL NIGHTLY
Status: DISCONTINUED | OUTPATIENT
Start: 2025-02-17 | End: 2025-02-20 | Stop reason: HOSPADM

## 2025-02-17 RX ORDER — VENLAFAXINE HYDROCHLORIDE 75 MG/1
150 CAPSULE, EXTENDED RELEASE ORAL DAILY
Status: DISCONTINUED | OUTPATIENT
Start: 2025-02-17 | End: 2025-02-20 | Stop reason: HOSPADM

## 2025-02-17 RX ORDER — ASPIRIN 81 MG/1
81 TABLET ORAL DAILY
Status: DISCONTINUED | OUTPATIENT
Start: 2025-02-17 | End: 2025-02-20 | Stop reason: HOSPADM

## 2025-02-17 RX ORDER — HYDROXYZINE HYDROCHLORIDE 10 MG/1
10 TABLET, FILM COATED ORAL DAILY
Status: DISCONTINUED | OUTPATIENT
Start: 2025-02-17 | End: 2025-02-20 | Stop reason: HOSPADM

## 2025-02-17 RX ORDER — BUPROPION HYDROCHLORIDE 100 MG/1
100 TABLET ORAL 2 TIMES DAILY
Status: DISCONTINUED | OUTPATIENT
Start: 2025-02-17 | End: 2025-02-20 | Stop reason: HOSPADM

## 2025-02-17 RX ORDER — ROPINIROLE 0.25 MG/1
0.5 TABLET, FILM COATED ORAL NIGHTLY PRN
Status: DISCONTINUED | OUTPATIENT
Start: 2025-02-17 | End: 2025-02-20 | Stop reason: HOSPADM

## 2025-02-17 RX ORDER — ALBUTEROL SULFATE 90 UG/1
2 INHALANT RESPIRATORY (INHALATION) EVERY 4 HOURS PRN
Status: DISCONTINUED | OUTPATIENT
Start: 2025-02-17 | End: 2025-02-20 | Stop reason: HOSPADM

## 2025-02-17 RX ORDER — GABAPENTIN 300 MG/1
300 CAPSULE ORAL 3 TIMES DAILY
Status: DISCONTINUED | OUTPATIENT
Start: 2025-02-17 | End: 2025-02-17

## 2025-02-17 RX ORDER — MEMANTINE HYDROCHLORIDE 5 MG/1
5 TABLET ORAL 2 TIMES DAILY
Status: DISCONTINUED | OUTPATIENT
Start: 2025-02-17 | End: 2025-02-18

## 2025-02-17 RX ORDER — PANTOPRAZOLE SODIUM 40 MG/1
40 TABLET, DELAYED RELEASE ORAL
Status: DISCONTINUED | OUTPATIENT
Start: 2025-02-17 | End: 2025-02-20 | Stop reason: HOSPADM

## 2025-02-17 RX ORDER — ENOXAPARIN SODIUM 100 MG/ML
30 INJECTION SUBCUTANEOUS EVERY 24 HOURS
Status: DISCONTINUED | OUTPATIENT
Start: 2025-02-17 | End: 2025-02-18

## 2025-02-17 RX ADMIN — DONEPEZIL HYDROCHLORIDE 10 MG: 5 TABLET, FILM COATED ORAL at 21:29

## 2025-02-17 RX ADMIN — PANTOPRAZOLE SODIUM 40 MG: 40 TABLET, DELAYED RELEASE ORAL at 05:05

## 2025-02-17 RX ADMIN — LEVOTHYROXINE SODIUM 100 MCG: 100 TABLET ORAL at 05:04

## 2025-02-17 RX ADMIN — Medication 10 ML: at 08:38

## 2025-02-17 RX ADMIN — MEMANTINE HYDROCHLORIDE 5 MG: 10 TABLET, FILM COATED ORAL at 21:29

## 2025-02-17 RX ADMIN — GABAPENTIN 100 MG: 100 CAPSULE ORAL at 21:29

## 2025-02-17 RX ADMIN — BUPROPION HYDROCHLORIDE 100 MG: 100 TABLET, FILM COATED ORAL at 21:31

## 2025-02-17 RX ADMIN — GABAPENTIN 100 MG: 100 CAPSULE ORAL at 08:53

## 2025-02-17 RX ADMIN — CLOPIDOGREL BISULFATE 75 MG: 75 TABLET ORAL at 08:37

## 2025-02-17 RX ADMIN — ENOXAPARIN SODIUM 30 MG: 100 INJECTION SUBCUTANEOUS at 18:23

## 2025-02-17 RX ADMIN — CEFTRIAXONE SODIUM 1000 MG: 1 INJECTION, POWDER, FOR SOLUTION INTRAMUSCULAR; INTRAVENOUS at 21:28

## 2025-02-17 RX ADMIN — Medication 5 MG: at 21:30

## 2025-02-17 RX ADMIN — BUDESONIDE AND FORMOTEROL FUMARATE DIHYDRATE 2 PUFF: 160; 4.5 AEROSOL RESPIRATORY (INHALATION) at 10:16

## 2025-02-17 RX ADMIN — ASPIRIN 81 MG: 81 TABLET, COATED ORAL at 08:37

## 2025-02-17 RX ADMIN — BUPROPION HYDROCHLORIDE 100 MG: 100 TABLET, FILM COATED ORAL at 08:37

## 2025-02-17 RX ADMIN — SODIUM CHLORIDE 100 ML/HR: 9 INJECTION, SOLUTION INTRAVENOUS at 18:24

## 2025-02-17 RX ADMIN — INSULIN LISPRO 2 UNITS: 100 INJECTION, SOLUTION INTRAVENOUS; SUBCUTANEOUS at 21:49

## 2025-02-17 RX ADMIN — VENLAFAXINE HYDROCHLORIDE 150 MG: 75 CAPSULE, EXTENDED RELEASE ORAL at 08:38

## 2025-02-17 RX ADMIN — ATORVASTATIN CALCIUM 10 MG: 10 TABLET ORAL at 08:37

## 2025-02-17 RX ADMIN — BUDESONIDE AND FORMOTEROL FUMARATE DIHYDRATE 2 PUFF: 160; 4.5 AEROSOL RESPIRATORY (INHALATION) at 19:31

## 2025-02-17 RX ADMIN — Medication 10 ML: at 21:29

## 2025-02-17 RX ADMIN — MEMANTINE HYDROCHLORIDE 5 MG: 10 TABLET, FILM COATED ORAL at 08:37

## 2025-02-17 RX ADMIN — Medication 1000 MCG: at 08:37

## 2025-02-17 RX ADMIN — Medication 1000 UNITS: at 08:37

## 2025-02-17 RX ADMIN — SODIUM CHLORIDE 500 ML: 9 INJECTION, SOLUTION INTRAVENOUS at 08:46

## 2025-02-17 NOTE — THERAPY EVALUATION
Patient Name: Radha Barbour  : 1950    MRN: [id number]                              Today's Date: 2025       Admit Date: 2025    Visit Dx:     ICD-10-CM ICD-9-CM   1. Renal insufficiency, mild  N28.9 593.9   2. Weakness generalized  R53.1 780.79   3. UTI (urinary tract infection) with pyuria  N39.0 599.0     Patient Active Problem List   Diagnosis    B12 deficiency    Back pain, chronic    Depression    Diabetic peripheral neuropathy    Restless leg syndrome    Osteoporosis    OAB (overactive bladder)    Lumbosacral radiculopathy    Insomnia    Hypothyroidism    HTN, goal below 130/80    Gastric reflux    Environmental and seasonal allergies    Dyslipidemia    DM type 2, goal HbA1c < 7%    Angina pectoris    SOB (shortness of breath)    Abnormal results of cardiovascular function studies    Dizziness    Cognitive impairment    Vitamin D deficiency    Diabetes mellitus    Primary hypertension    Anxiety    Neuropathy    Preventative health care    Coronary artery disease involving native heart without angina pectoris    Screening mammogram for breast cancer    Frequent falls    Bronchitis    Urinary tract infection without hematuria    Chronic obstructive pulmonary disease    Need for influenza vaccination    Anxiety and depression    Acute right-sided low back pain without sciatica    Encounter for subsequent annual wellness visit in Medicare patient    TIA (transient ischemic attack)    Asymptomatic menopause    Encounter for screening for osteoporosis    UTI (urinary tract infection)    MELODY (acute kidney injury)     Past Medical History:   Diagnosis Date    Anxiety     Arthritis     B12 deficiency 2016    Last Assessment & Plan:  Formatting of this note might be different from the original.  Compliant and controlled with current medication B 12 supplements . Lab today B 12    Back pain, chronic 2016    Last Assessment & Plan:  Formatting of this note might be different from  the original. Will refer to PT    Cancer 2009    Colon    Colon polyp 2005    Colon Cancer    Coronary artery disease involving native heart without angina pectoris 06/02/2022    Dementia 2021    Doctor prescribed medicine for dementia/memory loss    Depression 07/08/2016    Last Assessment & Plan:  Formatting of this note might be different from the original.  Compliant and controlled with current medication    Diabetes mellitus 06/02/2022    Diabetic peripheral neuropathy 02/14/2020    Last Assessment & Plan:  Formatting of this note might be different from the original. Stable    Difficulty walking 2022    Extreme unsteadiness on feet    DM type 2, goal HbA1c < 7% 03/27/2017    Last Assessment & Plan:  Formatting of this note might be different from the original.  Compliant and controlled with current diet.llabs today A1c    Dyslipidemia 10/11/2017    Last Assessment & Plan:  Formatting of this note might be different from the original.  Compliant and controlled with current medication/statin therapy/labs today    Environmental and seasonal allergies 10/11/2019    Gastric reflux 03/27/2017    HTN, goal below 130/80 02/10/2016    Last Assessment & Plan:  Formatting of this note might be different from the original. Compliant and controlled with current medication ramipril /labs today cmp to check renal function and electrolytes    Hyperlipidemia     Hypothyroidism 1990    Last Assessment & Plan:  Formatting of this note might be different from the original. TSH low reduce levothyroxine.  Will check TSH today.    Insomnia 01/16/2015    Lumbosacral radiculopathy 02/14/2020    Last Assessment & Plan:  Formatting of this note might be different from the original.  Compliant and controlled with current medication Muscle relaxer    Memory loss 2020    Osteoporosis 05/26/2020    Formatting of this note is different from the original. RADIOLOGY REPORT   FACILITY:  [facility] UN[facility]/AGE/GENDER: [id number]  OP       AGE:69 Y          SEX:F PATIENT NAME/:  GM DERAS    1950 UNIT NUMBER:  [id number] ACCOUNT NUMBER:  [id number] ACCESSION NUMBER:  [id number]     EXAMINATION: Bone densitometry of multiple sites, lumbar spine, left hip and distal    Restless leg syndrome 2018    Last Assessment & Plan:  Formatting of this note might be different from the original.  Compliant and controlled with current medication requip    Shortness of breath     TIA (transient ischemic attack) 3/28/2024     Past Surgical History:   Procedure Laterality Date    BACK SURGERY  2007    lower    CARDIAC CATHETERIZATION Right 04/15/2022    Procedure: Left Heart Cath;  Surgeon: Laila Alvarez MD;  Location: [facility] CATH INVASIVE LOCATION;  Service: Cardiovascular;  Laterality: Right;    CARDIAC CATHETERIZATION  4/15/2022    COLON SURGERY      REPLACEMENT TOTAL KNEE Left     redone in     REPLACEMENT TOTAL KNEE Right       General Information       Row Name 25 1443          OT Time and Intention    Patient Effort adequate  -       Row Name 25 1443          General Information    Prior Level of Function independent:;all household mobility;ADL's  has RW but doesn't use it. Does not use grab bars, shower chair, etc.  -     Existing Precautions/Restrictions orthostatic hypotension  -     Barriers to Rehab none identified  -       Row Name 25 1443          Living Environment    People in Home sibling(s)  sister & MIREILLE.  -       Row Name 25 1443          Home Main Entrance    Number of Stairs, Main Entrance two  -     Stair Railings, Main Entrance railings safe and in good condition  -       Row Name 25 1443          Stairs Within Home, Primary    Stairs, Within Home, Primary lives in basement  -     Number of Stairs, Within Home, Primary twelve  pt lives in the basement  -     Stair Railings, Within Home, Primary railings safe and in good condition;railing on  right side (ascending)  -       Row Name 02/17/25 1508 02/17/25 1443       Cognition    Orientation Status (Cognition) verbal cues/prompts needed for orientation;other (see comments)  forgetful but grossly oriented  - oriented x 4  -      Row Name 02/17/25 1443          Safety Issues/Impairments Affecting Functional Mobility    Impairments Affecting Function (Mobility) balance;endurance/activity tolerance  -               User Key  (r) = Recorded By, (t) = Taken By, (c) = Cosigned By      Initials Name Provider Type     Ammy Kee, OT Occupational Therapist                     Mobility/ADL's       [facility] Name 02/17/25 1509          Bed Mobility    Bed Mobility bed mobility (all) activities  -     All Activities, [place] (Bed Mobility) standby assist  -     Comment, (Bed Mobility) when pt sits up she then needs support and when she stands she becomes pre-syncopal with OH. But in supine pt appears strong and can roll & scoot herself up with only line management.  -       Row Name 02/17/25 1509          Functional Mobility    Patient was able to Ambulate no, other medical factors prevent ambulation  -     Reason Patient was unable to Ambulate Hypotension  -[facility] Name 02/17/25 1509          Activities of Daily Living    BADL Assessment/Intervention feeding;grooming;toileting  -[facility] Name 02/17/25 1509          Self-Feeding Assessment/Training    [place] Level (Feeding) feeding skills;modified independence  -     Position (Feeding) sitting up in bed  -[facility] Name 02/17/25 1509          Grooming Assessment/Training    [place] Level (Grooming) grooming skills;minimum assist (75% patient effort)  -     Position (Grooming) supported sitting  -[facility] Name 02/17/25 1509          Toileting Assessment/Training    [place] Level (Toileting) toileting skills;dependent (less than 25% patient effort)  -     Position (Toileting) supine  -               User Key   (r) = Recorded By, (t) = Taken By, (c) = Cosigned By      Initials Name Provider Type     Ammy Kee OT Occupational Therapist                   Obj/Interventions       [facility] Name 02/17/25 1511          Sensory Assessment (Somatosensory)    Sensory Assessment (Somatosensory) sensation intact  -MH       Row Name 02/17/25 1511          Vision Assessment/Intervention    Visual Impairment/Limitations WFL;corrective lenses for reading  -     Vision Assessment Comment working a crossword puzzle with readers.  -[facility] Name 02/17/25 1511          Range of Motion Comprehensive    General Range of Motion no range of motion deficits identified  -MH       Row Name 02/17/25 1511          Strength Comprehensive (MMT)    Comment, General Manual Muscle Testing (MMT) Assessment in supine MMT is WFL but pt becomes acutely weak when trying to stand.  -               User Key  (r) = Recorded By, (t) = Taken By, (c) = Cosigned By      Initials Name Provider Type     Ammy Kee OT Occupational Therapist                   Goals/Plan       [facility] Name 02/17/25 1551          Transfer Goal 1 (OT)    Activity/Assistive Device (Transfer Goal 1, OT) transfers, all  -MH     [place] Level/Cues Needed (Transfer Goal 1, OT) contact guard required  -     Time Frame (Transfer Goal 1, OT) 2 weeks  -MH       Row Name 02/17/25 1551          Bathing Goal 1 (OT)    Activity/Device (Bathing Goal 1, OT) bathing skills, all  -MH     [place] Level/Cues Needed (Bathing Goal 1, OT) verbal cues required;standby assist  -     Time Frame (Bathing Goal 1, OT) 2 weeks  -MH       Row Name 02/17/25 1551          Dressing Goal 1 (OT)    Activity/Device (Dressing Goal 1, OT) dressing skills, all  -MH     [place]/Cues Needed (Dressing Goal 1, OT) set-up required;supervision required  -     Time Frame (Dressing Goal 1, OT) 2 weeks  -[facility] Name 02/17/25 1551          Therapy Assessment/Plan (OT)    Planned Therapy  Interventions (OT) activity tolerance training;adaptive equipment training;BADL retraining;functional balance retraining;patient/caregiver education/training;occupation/activity based interventions;strengthening exercise  -               User Key  (r) = Recorded By, (t) = Taken By, (c) = Cosigned By      Initials Name Provider Type     Ammy Kee OT Occupational Therapist                   Clinical Impression       Row Name 02/17/25 1546          Pain Assessment    Pretreatment Pain Rating 0/10 - no pain  -     Posttreatment Pain Rating 0/10 - no pain  -       Row Name 02/17/25 1546          Plan of Care Review    Plan of Care Reviewed With patient  -     Progress no change  -     Outcome Evaluation 74 y.o. female with PMH of HTN, HLD, CAD, DM type II, dementia, hypothyroidism presented to  ED 2/16/2025 with family who stated the patient had an episode of altered mental status this afternoon. She had an incontinent episode of stool this afternoon and after cleaning her up the daughter went to sit her in the chair and the patient had an episode where she was poorly responsive. EMS was called and she had a low BP per the daughter. She was just discharged from rehab two days ago and pt reports she was okay at first but quickly became very lethargic and foggy. Pt is (+) UTI and Rhinovirus. She lives with her sister & MIREILLE in a home with several steps to enter and patient's living quarters are then in the basement. Pt has been very weak with trying to stand up, but can move herself around in the bed in supine with good strength. She is grossly oriented but forgetful and aware of this. Pt needs assist for ADl and basic mobility at this time is not safe to go home with ffamily. recommend SNF for addittional rehab.  -       Row Name 02/17/25 1546          Therapy Assessment/Plan (OT)    Rehab Potential (OT) good  -     Criteria for Skilled Therapeutic Interventions Met (OT) skilled treatment is  necessary  -     Therapy Frequency (OT) 3 times/wk  -     Predicted Duration of Therapy Intervention (OT) until d/c  -       Row Name 02/17/25 1546          Therapy Plan Review/Discharge Plan (OT)    Anticipated Discharge Disposition (OT) skilled nursing facility  -       Row Name 02/17/25 1546          Positioning and Restraints    Pre-Treatment Position in bed  -     Post Treatment Position bed  -     In Bed notified nsg;fowlers;call light within reach;encouraged to call for assist;exit alarm on  -               User Key  (r) = Recorded By, (t) = Taken By, (c) = Cosigned By      Initials Name Provider Type     Ammy Kee, OT Occupational Therapist                   Outcome Measures       Row Name 02/17/25 1442 02/17/25 1200       How much help from another person do you currently need...    Turning from your back to your side while in flat bed without using bedrails? 4  -DYLLAN 4  -KB    Moving from lying on back to sitting on the side of a flat bed without bedrails? 3  -DYLLAN 4  -KB    Moving to and from a bed to a chair (including a wheelchair)? 3  -DYLLAN 2  -KB    Standing up from a chair using your arms (e.g., wheelchair, bedside chair)? 3  -DYLLAN 2  -KB    Climbing 3-5 steps with a railing? 1  -DYLLAN 1  -KB    To walk in hospital room? 2  -DYLLAN 1  -KB    AM-PAC 6 Clicks Score (PT) 16  -DYLLAN 14  -KB    Highest Level of Mobility Goal 5 --> Static standing  -DYLLAN 4 --> Transfer to chair/commode  -KB      Row Name 02/17/25 0806 02/17/25 0800       How much help from another person do you currently need...    Turning from your back to your side while in flat bed without using bedrails? 4  -ER 4  -KB    Moving from lying on back to sitting on the side of a flat bed without bedrails? 4  -ER 4  -KB    Moving to and from a bed to a chair (including a wheelchair)? 2  -ER 2  -KB    Standing up from a chair using your arms (e.g., wheelchair, bedside chair)? 2  -ER 2  -KB    Climbing 3-5 steps with a railing? 1  -ER 1  -KB     To walk in hospital room? 1  -ER 1  -KB    AM-PAC 6 Clicks Score (PT) 14  -ER 14  -KB    Highest Level of Mobility Goal 4 --> Transfer to chair/commode  -ER 4 --> Transfer to chair/commode  -KB      Row Name 02/17/25 1442          Functional Assessment    Outcome Measure Options AM-PAC 6 Clicks Basic Mobility (PT)  -DYLLAN               User Key  (r) = Recorded By, (t) = Taken By, (c) = Cosigned By      Initials Name Provider Type    Tabatha Chaney, PT Physical Therapist    Veronica Schaeffer, RN Registered Nurse    Jennifer Hernanedz RN Registered Nurse                    Occupational Therapy Education       Title: PT OT SLP Therapies (In Progress)       Topic: Occupational Therapy (In Progress)       Point: ADL training (Done)       Description:   Instruct learner(s) on proper safety adaptation and remediation techniques during self care or transfers.   Instruct in proper use of assistive devices.                  Learning Progress Summary            Patient Acceptance, E, VU,NR by  at 2/17/2025 1552                      Point: Home exercise program (Not Started)       Description:   Instruct learner(s) on appropriate technique for monitoring, assisting and/or progressing therapeutic exercises/activities.                  Learner Progress:  Not documented in this visit.              Point: Precautions (Done)       Description:   Instruct learner(s) on prescribed precautions during self-care and functional transfers.                  Learning Progress Summary            Patient Acceptance, E, VU,NR by  at 2/17/2025 1552                      Point: Body mechanics (Done)       Description:   Instruct learner(s) on proper positioning and spine alignment during self-care, functional mobility activities and/or exercises.                  Learning Progress Summary            Patient Acceptance, E, VU,NR by  at 2/17/2025 1552                                      User Key       Initials Effective Dates Name Provider  Type Discipline     06/16/21 -  Ammy eKe OT Occupational Therapist OT                  OT Recommendation and Plan  Planned Therapy Interventions (OT): activity tolerance training, adaptive equipment training, BADL retraining, functional balance retraining, patient/caregiver education/training, occupation/activity based interventions, strengthening exercise  Therapy Frequency (OT): 3 times/wk  Plan of Care Review  Plan of Care Reviewed With: patient  Progress: no change  Outcome Evaluation: 74 y.o. female with PMH of HTN, HLD, CAD, DM type II, dementia, hypothyroidism presented to  ED 2/16/2025 with family who stated the patient had an episode of altered mental status this afternoon. She had an incontinent episode of stool this afternoon and after cleaning her up the daughter went to sit her in the chair and the patient had an episode where she was poorly responsive. EMS was called and she had a low BP per the daughter. She was just discharged from rehab two days ago and pt reports she was okay at first but quickly became very lethargic and foggy. Pt is (+) UTI and Rhinovirus. She lives with her sister & MIREILLE in a home with several steps to enter and patient's living quarters are then in the basement. Pt has been very weak with trying to stand up, but can move herself around in the bed in supine with good strength. She is grossly oriented but forgetful and aware of this. Pt needs assist for ADl and basic mobility at this time is not safe to go home with ffamily. recommend SNF for addittional rehab.     Time Calculation:         Time Calculation- OT       Row Name 02/17/25 1552             Time Calculation-     OT Start Time 1437  -      OT Stop Time 1500  -      OT Time Calculation (min) 23 min  -      Total Timed Code Minutes- OT 0 minute(s)  -      OT Received On 02/17/25  -      OT - Next Appointment 02/19/25  -      OT Goal Re-Cert Due Date 03/03/25  -                User Key  (r) =  Recorded By, (t) = Taken By, (c) = Cosigned By      Initials Name Provider Type     Ammy Kee, ELISA Occupational Therapist                  Therapy Charges for Today       Code Description Service Date Service Provider Modifiers Qty    07672533599 HC OT EVAL MOD COMPLEXITY 3 2/17/2025 Ammy Kee OT GO 1                 Ammy Kee OT  2/17/2025

## 2025-02-17 NOTE — H&P
[facility]    Hospitalist History and Physical     Radha Barbour : 1950 MRN:[id number] LOS:0 ROOM:      Reason for admission: UTI (urinary tract infection)     Assessment / Plan     Acute kidney injury  Recurrent UTI  Borderline hypotension   - BUN 24/creatinine 2.35 (compared to BUN 17 and creatinine 0.93 on 2024)  - UA: large blood, large leukocytes, too numerous RBCs, too numerous WBCs, 4+ bacteria, 3-6 squamous epithelial cells  - WBC 11.1, HR normal, BP borderline low. Does not appear septic  - pt has been on macrobid, hold   - IVFs, IV rocephin  - follow urine culture  - hold home BP meds    Transient episode of confusion  Generalized weakness  - CT head: atrophy and chronic microvascular ischemic change.  No acute intracranial process  - suspect patient was orthostatic as her BP is low sitting in the bed and the episode occurred when going from the bed to a chair.   - pt is currently alert, oriented x3, moves all extremities equally   - EKG: Sinus or ectopic atrial rhythm rate 73. LVH with secondary repolarization abnormality   - check orthostatics  - neuro checks qshift  - PT/OT eval, case management as patient needs placement     Rhinovirus  - normal oxygen saturation on room air  - CXR: no active disease  - duonebs prn, continuous pulse oximetry     Hypertension  - hold home BP meds due to borderline low BP and MELODY    Hyperlipidemia  - cont home statin when home meds verified    CAD  - cont home plavix, statin    DM type II  - hold home metformin  - add SSI    Hypothyroidism  - cont home synthroid    Dementia  - cont home aricept, namenda, atarax     Nutrition:   Diet: Diabetic; Consistent Carbohydrate; Fluid Consistency: Thin (IDDSI 0)     VTE Prophylaxis:  Mechanical VTE prophylaxis orders are present.         History of Present illness     Radha Barbour is a 74 y.o. female with PMH of HTN, HLD, CAD, DM type II, dementia, hypothyroidism presented to   ED 2/16/2025 with family who stated the patient had an episode of altered mental status this afternoon. She had an incontinent episode of stool this afternoon and after cleaning her up the daughter went to sit her in the chair and the patient had an episode where she was poorly responsive. EMS was called and she had a low BP per the daughter. She was just discharged from rehab two days ago and the family felt she was discharged too quickly but she did not have medicaid only medicare so she was discharged home. She lives with her sister. They feel she needs further rehab. She has had a mild cough and some shortness of breath. She has been fighting a UTI.     In the ED CBC showed WBC 11.1, CMP showed BUN 24/creatinine 2.35 (compared to BUN 17 and creatinine 0.93 on 11/2024).  Urinalysis showed large blood, large leukocytes, too numerous RBCs, too numerous WBCs, 4+ bacteria, 3-6 squamous epithelial cells.  CT head showed atrophy and chronic microvascular ischemic change.  No acute intracranial process.  Chest x-ray showed no active disease. Vitals signs normal on room air.  Blood pressure was a little low at 98/80. RVP detected rhinovirus. She was started on IVFs and IV rocephin. She was admitted for further treatment of UTI, MELODY, weakness.     Subjective / Review of systems     Review of Systems   HENT: Negative.     Eyes: Negative.    Respiratory: Negative.     Cardiovascular: Negative.    Gastrointestinal: Negative.    Genitourinary: Negative.    Musculoskeletal: Negative.    Skin: Negative.    Neurological:  Positive for weakness.   Psychiatric/Behavioral: Negative.          Past Medical/Surgical/Social/Family History & Allergies     Past Medical History:   Diagnosis Date    Anxiety     Arthritis 1998    B12 deficiency 07/08/2016    Last Assessment & Plan:  Formatting of this note might be different from the original.  Compliant and controlled with current medication B 12 supplements . Lab today B 12    Back pain,  chronic 07/08/2016    Last Assessment & Plan:  Formatting of this note might be different from the original. Will refer to PT    Cancer 2009    Colon    Colon polyp 2005    Colon Cancer    Coronary artery disease involving native heart without angina pectoris 06/02/2022    Dementia 2021    Doctor prescribed medicine for dementia/memory loss    Depression 07/08/2016    Last Assessment & Plan:  Formatting of this note might be different from the original.  Compliant and controlled with current medication    Diabetes mellitus 06/02/2022    Diabetic peripheral neuropathy 02/14/2020    Last Assessment & Plan:  Formatting of this note might be different from the original. Stable    Difficulty walking 2022    Extreme unsteadiness on feet    DM type 2, goal HbA1c < 7% 03/27/2017    Last Assessment & Plan:  Formatting of this note might be different from the original.  Compliant and controlled with current diet.llabs today A1c    Dyslipidemia 10/11/2017    Last Assessment & Plan:  Formatting of this note might be different from the original.  Compliant and controlled with current medication/statin therapy/labs today    Environmental and seasonal allergies 10/11/2019    Gastric reflux 03/27/2017    HTN, goal below 130/80 02/10/2016    Last Assessment & Plan:  Formatting of this note might be different from the original. Compliant and controlled with current medication ramipril /labs today cmp to check renal function and electrolytes    Hyperlipidemia     Hypothyroidism 1990    Last Assessment & Plan:  Formatting of this note might be different from the original. TSH low reduce levothyroxine.  Will check TSH today.    Insomnia 01/16/2015    Lumbosacral radiculopathy 02/14/2020    Last Assessment & Plan:  Formatting of this note might be different from the original.  Compliant and controlled with current medication Muscle relaxer    Memory loss 2020    Osteoporosis 05/26/2020    Formatting of this note is different from the  original. RADIOLOGY REPORT   FACILITY:  [facility] UNIT/AGE/GENDER: [id number]  OP      AGE:69 Y          SEX:F PATIENT NAME/:  GM DERAS    1950 UNIT NUMBER:  [id number] ACCOUNT NUMBER:  [id number] ACCESSION NUMBER:  [id number]     EXAMINATION: Bone densitometry of multiple sites, lumbar spine, left hip and distal    Restless leg syndrome 2018    Last Assessment & Plan:  Formatting of this note might be different from the original.  Compliant and controlled with current medication requip    Shortness of breath     TIA (transient ischemic attack) 3/28/2024      Past Surgical History:   Procedure Laterality Date    BACK SURGERY  2007    lower    CARDIAC CATHETERIZATION Right 04/15/2022    Procedure: Left Heart Cath;  Surgeon: Laila Alvarez MD;  Location: [facility] CATH INVASIVE LOC[facility];  Service: Cardiovascular;  Laterality: Right;    CARDIAC CATHETERIZATION  4/15/2022    COLON SURGERY      REPLACEMENT TOTAL KNEE Left     redone in     REPLACEMENT TOTAL KNEE Right       Social History     Socioeconomic History    Marital status:    Tobacco Use    Smoking status: Former     Current packs/day: 0.00     Average packs/day: 2.0 packs/day for 20.0 years (40.0 ttl pk-yrs)     Types: Cigarettes     Start date: 1978     Quit date: 1998     Years since quittin.1    Smokeless tobacco: Never   Vaping Use    Vaping status: Never Used   Substance and Sexual Activity    Alcohol use: Yes     Comment: socially    Drug use: Never     Comment: CBD oils    Sexual activity: Not Currently     Partners: Male     Birth control/protection: Post-menopausal, None      Family History   Problem Relation Age of Onset    Hypertension Mother     Arthritis Mother     Colon cancer Mother     Thyroid disease Mother     Stroke Mother     Cancer Mother         Colon    Dementia Mother     Heart disease Sister     Heart disease Sister     Dementia Maternal Aunt      Dementia Maternal Aunt       No Known Allergies   Social Drivers of Health     Tobacco Use: Medium Risk (1/9/2025)    Patient History     Smoking Tobacco Use: Former     Smokeless Tobacco Use: Never     Passive Exposure: Not on file   Alcohol Use: Not on file   Financial Resource Strain: Not on file   Food Insecurity: Not on file   Transportation Needs: Not on file   Physical Activity: Not on file   Stress: Not on file   Social Connections: Not on file   Interpersonal Safety: Not At Risk (2/16/2025)    Abuse Screen     Unsafe at Home or Work/School: no     Feels Threatened by Someone?: no     Does Anyone Keep You from Contacting Others or Doint Things Outside the Home?: no     Physical Sign of Abuse Present: no   Depression: Not at risk (1/9/2025)    PHQ-2     PHQ-2 Score: 0   Housing Stability: Not on file   Utilities: Not on file   Health Literacy: Not on file   Employment: Not on file   Disabilities: Not on file        Home Medications     Prior to Admission medications    Medication Sig Start Date End Date Taking? Authorizing Provider   albuterol sulfate  (90 Base) MCG/ACT inhaler Inhale 2 puffs Every 4 (Four) Hours As Needed for Wheezing or Shortness of Air. 7/5/24   Michell Ramirez APRN   amLODIPine (NORVASC) 5 MG tablet Take 1 tablet by mouth Daily. 9/21/23   Laila Alvarez MD   aspirin 81 MG EC tablet Take 1 tablet by mouth Daily.    Provider, MD Florin   budesonide-formoterol (SYMBICORT) 160-4.5 MCG/ACT inhaler INHALE 2 PUFFS BY MOUTH TWICE A DAY 10/16/24   Michell Ramriez APRN   buPROPion (WELLBUTRIN) 100 MG tablet TAKE 1 TABLET BY MOUTH 2 TIMES A DAY 7/16/24   Michell Ramirez APRN   CBD (cannabidiol) oral oil Take 1 drop by mouth Daily As Needed.    Provider, MD Florin   cloNIDine (CATAPRES) 0.2 MG tablet TAKE 1 TABLET BY MOUTH DAILY 7/11/24   Michell Ramirez APRN   clopidogrel (PLAVIX) 75 MG tablet Take 1 tablet by mouth Daily. 2/4/25   Michell Ramirez  GINA   donepezil (ARICEPT) 10 MG tablet Take 1 tablet by mouth Every Night. 4/22/24   Seipel, Joseph F, MD   erythromycin (ROMYCIN) 5 MG/GM ophthalmic ointment  4/9/24   ProviderFlorin MD   gabapentin (NEURONTIN) 300 MG capsule TAKE 1 CAPSULE BY MOUTH 3 TIMES A DAY 7/11/24   Michell Ramirez APRN   hydrOXYzine (ATARAX) 10 MG tablet TAKE 1 TABLET BY MOUTH DAILY 10/31/24   Michell Ramirez APRN   levothyroxine (SYNTHROID, LEVOTHROID) 125 MCG tablet Take 1 tablet by mouth Daily. 2/26/24   Florin Parker MD   meclizine (ANTIVERT) 25 MG tablet Take 1 tablet by mouth 3 (Three) Times a Day As Needed for Dizziness. 11/7/24   Michell Ramirez APRN   memantine (NAMENDA) 10 MG tablet TAKE 1 TABLET BY MOUTH 2 TIMES A DAY 10/31/24   Michell Ramirez APRN   metFORMIN (GLUCOPHAGE) 1000 MG tablet TAKE 1 TABLET BY MOUTH DAILY WITH BREAKFAST 7/16/24   Michell Ramirez APRN   nitrofurantoin, macrocrystal-monohydrate, (Macrobid) 100 MG capsule Take 1 capsule by mouth 2 (Two) Times a Day. 1/12/25   Miriam Tripathi APRN   omeprazole (priLOSEC) 40 MG capsule TAKE 1 CAPSULE BY MOUTH DAILY 7/11/24   Michell Ramirez APRN   pravastatin (PRAVACHOL) 20 MG tablet TAKE 1 TABLET BY MOUTH DAILY 10/31/24   Michell Ramirez APRN   ramipril (ALTACE) 2.5 MG capsule TAKE 2 CAPSULES BY MOUTH DAILY 10/31/24   Michell Ramirez APRN   rOPINIRole (REQUIP) 0.5 MG tablet TAKE 1 TABLET BY MOUTH EVERY NIGHT AT BEDTIME 1 HOUR BEFORE BEDTIME 7/11/24   Michell Ramirez APRN   traZODone (DESYREL) 50 MG tablet Take 1 tablet by mouth At Night As Needed for Sleep. 6/16/23   Michell Ramirez APRN   venlafaxine XR (EFFEXOR-XR) 150 MG 24 hr capsule Take 1 capsule by mouth Daily. 7/16/24   Michell Ramirez APRN   vitamin B-12 (CYANOCOBALAMIN) 1000 MCG tablet Take 1 tablet by mouth Daily.    Provider, MD Florin   Vitamin D, Cholecalciferol, 50 MCG (2000 UT) capsule Take 1 capsule by mouth Daily.    Provider,  MD Florin        Objective / Physical Exam     Vital signs:  Temp: 98.8 °F (37.1 °C)  BP: 129/61  Heart Rate: 79  Resp: 16  SpO2: 98 %  Weight: 81.6 kg (180 lb)    Admission Weight: Weight: 81.6 kg (180 lb)    Physical Exam  Vitals and nursing note reviewed.   HENT:      Head: Normocephalic and atraumatic.   Eyes:      Extraocular Movements: Extraocular movements intact.      Pupils: Pupils are equal, round, and reactive to light.   Cardiovascular:      Rate and Rhythm: Normal rate and regular rhythm.      Pulses: Normal pulses.      Heart sounds: Normal heart sounds.   Pulmonary:      Effort: Pulmonary effort is normal.      Breath sounds: Normal breath sounds.   Abdominal:      General: Bowel sounds are normal.      Palpations: Abdomen is soft.      Tenderness: There is no abdominal tenderness.   Musculoskeletal:         General: Normal range of motion.   Skin:     General: Skin is warm and dry.   Neurological:      Mental Status: She is alert and oriented to person, place, and time.   Psychiatric:         Mood and Affect: Mood normal.         Behavior: Behavior normal.          Labs     Results from last 7 days   Lab Units 02/16/25 2032   WBC 10*3/mm3 11.15*   HEMOGLOBIN g/dL 14.5   HEMATOCRIT % 46.5   PLATELETS 10*3/mm3 423      Results from last 7 days   Lab Units 02/16/25 2032   ALK PHOS U/L 87   AST (SGOT) U/L 26   ALT (SGPT) U/L 10           Results from last 7 days   Lab Units 02/16/25 2032   SODIUM mmol/L 140   POTASSIUM mmol/L 3.9   CHLORIDE mmol/L 99   CO2 mmol/L 24.7   BUN mg/dL 24*   CREATININE mg/dL 2.35*   GLUCOSE mg/dL 125*        Imaging     CT Head Without Contrast    Result Date: 2/16/2025  CT HEAD WO CONTRAST Date of Exam: 2/16/2025 9:35 PM EST Indication: altered mental status. Comparison: CT scan the head 1/26/2025 Technique: Axial CT images were obtained of the head without contrast administration.  Coronal reconstructions were performed.  Automated exposure control and iterative  reconstruction methods were used. Findings: There is mild diffuse generalized atrophy. There are low-attenuation areas in the periventricular white matter consistent with chronic microvascular ischemic change. There is no mass, mass effect or midline shift. There are no abnormal extra-axial fluid collections or areas of acute hemorrhage. There are are bilateral maxillary sinus air-fluid levels. There is bilateral ethmoid sinus disease. The right mastoid air cells are non pneumatized. The left are clear.     Impression: Atrophy and chronic microvascular ischemic change. No acute intracranial process. Electronically Signed: Byron Shi MD  2/16/2025 10:20 PM EST  Workstation ID: [id number]    XR Chest 1 View    Result Date: 2/16/2025  XR CHEST 1 VW Date of Exam: 2/16/2025 8:58 PM EST Indication: Altered mental status Comparison: Chest x-ray 1/26/2025 Findings: There are no airspace consolidations. No pleural fluid. No pneumothorax. The pulmonary vasculature appears within normal limits. The cardiac and mediastinal silhouette appear unremarkable. No acute osseous abnormality identified.     Impression: No active disease. Electronically Signed: Byron Shi MD  2/16/2025 9:23 PM EST  Workstation ID: [id number]        Current Medications     Scheduled Meds:  budesonide-formoterol, 2 puff, Inhalation, BID - RT  sodium chloride, 10 mL, Intravenous, Q12H         Continuous Infusions:  sodium chloride, 100 mL/hr, Last Rate: 100 mL/hr (02/16/25 2210)           Elsie Guzmán [facility]  02/16/25   23:28 EST

## 2025-02-17 NOTE — CONSULTS
[facility] Consult Note      Patient Name: Radha Barbour  : 1950  MRN: [id number]  Primary Care Physician:  Michell Ramirez APRN  Referring Physician: GINA Minaya  Date of admission: 2025    Subjective     Reason for Consult:  yonas    HPI:   Radha Barbour is a 74 y.o. female admitted with confusion, transient hypotension, and simultaneous uti and rhinovirus infections. She was found to have a creatinine level of 2 where it was 0.9 on  prompting renal consultation. She seems awake and alert today. She has been treated for her uti and given ivf. She was initially hypotensive but her ramipril was stopped and it quickly resolved. She has no dysuria n/v or flank pain. She does not smoke or take nsaids.    Review of Systems:   14 point review of systems is otherwise negative except for mentioned above on HPI    Personal History     Past Medical History:   Diagnosis Date    Anxiety     Arthritis     B12 deficiency 2016    Last Assessment & Plan:  Formatting of this note might be different from the original.  Compliant and controlled with current medication B 12 supplements . Lab today B 12    Back pain, chronic 2016    Last Assessment & Plan:  Formatting of this note might be different from the original. Will refer to PT    Cancer 2009    Colon    Colon polyp     Colon Cancer    Coronary artery disease involving native heart without angina pectoris 2022    Dementia     Doctor prescribed medicine for dementia/memory loss    Depression 2016    Last Assessment & Plan:  Formatting of this note might be different from the original.  Compliant and controlled with current medication    Diabetes mellitus 2022    Diabetic peripheral neuropathy 2020    Last Assessment & Plan:  Formatting of this note might be different from the original. Stable    Difficulty walking     Extreme unsteadiness on feet    DM type  2, goal HbA1c < 7% 2017    Last Assessment & Plan:  Formatting of this note might be different from the original.  Compliant and controlled with current diet.llabs today A1c    Dyslipidemia 10/11/2017    Last Assessment & Plan:  Formatting of this note might be different from the original.  Compliant and controlled with current medication/statin therapy/labs today    Environmental and seasonal allergies 10/11/2019    Gastric reflux 2017    HTN, goal below 130/80 02/10/2016    Last Assessment & Plan:  Formatting of this note might be different from the original. Compliant and controlled with current medication ramipril /labs today cmp to check renal function and electrolytes    Hyperlipidemia     Hypothyroidism     Last Assessment & Plan:  Formatting of this note might be different from the original. TSH low reduce levothyroxine.  Will check TSH today.    Insomnia 2015    Lumbosacral radiculopathy 2020    Last Assessment & Plan:  Formatting of this note might be different from the original.  Compliant and controlled with current medication Muscle relaxer    Memory loss     Osteoporosis 2020    Formatting of this note is different from the original. RADIOLOGY REPORT   FACILITY:  [facility] UNIT/AGE/GENDER: [id number]  OP      AGE:69 Y          SEX:F PATIENT NAME/:  GM DERAS    1950 UNIT NUMBER:  [id number] ACCOUNT NUMBER:  [id number] ACCESSION NUMBER:  [id number]     EXAMINATION: Bone densitometry of multiple sites, lumbar spine, left hip and distal    Restless leg syndrome 2018    Last Assessment & Plan:  Formatting of this note might be different from the original.  Compliant and controlled with current medication requip    Shortness of breath     TIA (transient ischemic attack) 3/28/2024       Past Surgical History:   Procedure Laterality Date    BACK SURGERY  2007    lower    CARDIAC CATHETERIZATION Right 04/15/2022    Procedure:  Left Heart Cath;  Surgeon: Laila Alvarez MD;  Location: [facility] CATH INVASIVE LOCATION;  Service: Cardiovascular;  Laterality: Right;    CARDIAC CATHETERIZATION  4/15/2022    COLON SURGERY  2006    REPLACEMENT TOTAL KNEE Left 2002    redone in 2010    REPLACEMENT TOTAL KNEE Right 2002       Family History: family history includes Arthritis in her mother; Cancer in her mother; Colon cancer in her mother; Dementia in her maternal aunt, maternal aunt, and mother; Heart disease in her sister and sister; Hypertension in her mother; Stroke in her mother; Thyroid disease in her mother.    Social History:  reports that she quit smoking about 27 years ago. Her smoking use included cigarettes. She started smoking about 47 years ago. She has a 40 pack-year smoking history. She has never used smokeless tobacco. She reports current alcohol use. She reports that she does not use drugs.    Home Medications:  Prior to Admission medications    Medication Sig Start Date End Date Taking? Authorizing Provider   albuterol sulfate  (90 Base) MCG/ACT inhaler Inhale 2 puffs Every 4 (Four) Hours As Needed for Wheezing or Shortness of Air. 7/5/24  Yes Michell Ramirez APRN   aspirin 81 MG EC tablet Take 1 tablet by mouth Daily.   Yes Florin Parker MD   budesonide-formoterol (SYMBICORT) 160-4.5 MCG/ACT inhaler INHALE 2 PUFFS BY MOUTH TWICE A DAY 10/16/24  Yes Mcihell Ramirez APRN   buPROPion (WELLBUTRIN) 100 MG tablet TAKE 1 TABLET BY MOUTH 2 TIMES A DAY 7/16/24  Yes Michell Ramirez APRN   CBD (cannabidiol) oral oil Take 1 drop by mouth Daily As Needed.   Yes ProviderFlorin MD   cloNIDine (CATAPRES) 0.2 MG tablet TAKE 1 TABLET BY MOUTH DAILY 7/11/24  Yes Michell Ramirez APRN   clopidogrel (PLAVIX) 75 MG tablet Take 1 tablet by mouth Daily. 2/4/25  Yes Michell Ramirez APRN   donepezil (ARICEPT) 10 MG tablet Take 1 tablet by mouth Every Night. 4/22/24  Yes Seipel, Joseph F, MD   gabapentin  (NEURONTIN) 300 MG capsule TAKE 1 CAPSULE BY MOUTH 3 TIMES A DAY 7/11/24  Yes Michell Ramirez APRN   hydrOXYzine (ATARAX) 10 MG tablet TAKE 1 TABLET BY MOUTH DAILY 10/31/24  Yes Michell Ramirez APRN   levothyroxine (SYNTHROID, LEVOTHROID) 100 MCG tablet Take 1 tablet by mouth Daily. 2/26/24  Yes Florin Parker MD   meclizine (ANTIVERT) 25 MG tablet Take 1 tablet by mouth 3 (Three) Times a Day As Needed for Dizziness. 11/7/24  Yes Michell Ramirez APRN   memantine (NAMENDA) 10 MG tablet TAKE 1 TABLET BY MOUTH 2 TIMES A DAY 10/31/24  Yes Michell Ramirez APRN   metFORMIN (GLUCOPHAGE) 1000 MG tablet TAKE 1 TABLET BY MOUTH DAILY WITH BREAKFAST 7/16/24  Yes Michell Ramirez APRN   omeprazole (priLOSEC) 40 MG capsule TAKE 1 CAPSULE BY MOUTH DAILY 7/11/24  Yes Michell Ramirez APRN   pravastatin (PRAVACHOL) 20 MG tablet TAKE 1 TABLET BY MOUTH DAILY 10/31/24  Yes Michell Ramirez APRN   ramipril (ALTACE) 2.5 MG capsule TAKE 2 CAPSULES BY MOUTH DAILY 10/31/24  Yes Michell Ramirez APRN   rOPINIRole (REQUIP) 0.5 MG tablet TAKE 1 TABLET BY MOUTH EVERY NIGHT AT BEDTIME 1 HOUR BEFORE BEDTIME 7/11/24  Yes Michell Ramirez APRN   venlafaxine XR (EFFEXOR-XR) 150 MG 24 hr capsule Take 1 capsule by mouth Daily. 7/16/24  Yes Michell Ramirez APRN   vitamin B-12 (CYANOCOBALAMIN) 1000 MCG tablet Take 1 tablet by mouth Daily.   Yes ProviderFlorin MD   Vitamin D, Cholecalciferol, 50 MCG (2000 UT) capsule Take 1 capsule by mouth Daily.   Yes Florin Parker MD   traZODone (DESYREL) 50 MG tablet Take 1 tablet by mouth At Night As Needed for Sleep. 6/16/23 2/17/25 Yes Michell Ramirez APRN   amLODIPine (NORVASC) 5 MG tablet Take 1 tablet by mouth Daily. 9/21/23 2/17/25  Laila Alvarez MD   erythromycin (ROMYCIN) 5 MG/GM ophthalmic ointment  4/9/24 2/17/25  Provider, MD Florin   nitrofurantoin, macrocrystal-monohydrate, (Macrobid) 100 MG capsule Take 1 capsule by  mouth 2 (Two) Times a Day. 1/12/25 2/17/25  Miriam Tripathi GIBSON, APRALVAREZ       Allergies:  No Known Allergies    Objective     Vitals:   Temp:  [98.4 °F (36.9 °C)-99.1 °F (37.3 °C)] 98.4 °F (36.9 °C)  Heart Rate:  [] 93  Resp:  [12-21] 21  BP: (101-129)/(49-87) 112/87  No intake or output data in the 24 hours ending 02/17/25 1701    Physical Exam:    General Appearance: alert, oriented x 3, no acute distress   Skin: warm and dry  HEENT: oral mucosa normal, nonicteric sclera  Neck: supple, no JVD  Lungs: CTA  Heart: RRR, normal S1 and S2  Abdomen: soft, nontender, nondistended  : no palpable bladder  Extremities: no edema, cyanosis or clubbing  Neuro: normal speech and mental status     Scheduled Meds:     aspirin, 81 mg, Oral, Daily  atorvastatin, 10 mg, Oral, Daily  budesonide-formoterol, 2 puff, Inhalation, BID - RT  buPROPion, 100 mg, Oral, BID  cefTRIAXone, 1,000 mg, Intravenous, Q24H  cholecalciferol, 1,000 Units, Oral, Daily  clopidogrel, 75 mg, Oral, Daily  donepezil, 10 mg, Oral, Nightly  enoxaparin, 30 mg, Subcutaneous, Q24H  gabapentin, 100 mg, Oral, TID  [Held by provider] hydrOXYzine, 10 mg, Oral, Daily  insulin lispro, 2-7 Units, Subcutaneous, 4x Daily AC & at Bedtime  levothyroxine, 100 mcg, Oral, Q AM  memantine, 5 mg, Oral, BID  pantoprazole, 40 mg, Oral, Q AM  sodium chloride, 10 mL, Intravenous, Q12H  venlafaxine XR, 150 mg, Oral, Daily  vitamin B-12, 1,000 mcg, Oral, Daily      IV Meds:   Pharmacy to Dose enoxaparin (LOVENOX),   sodium chloride, 100 mL/hr, Last Rate: 100 mL/hr (02/17/25 1343)        Results Reviewed:   I have personally reviewed the results from the time of this admission to 2/17/2025 17:01 EST     Lab Results   Component Value Date    GLUCOSE 234 (H) 02/17/2025    CALCIUM 8.9 02/17/2025     02/17/2025    K 3.1 (L) 02/17/2025    CO2 25.0 02/17/2025    CL 98 02/17/2025    BUN 25 (H) 02/17/2025    CREATININE 2.37 (H) 02/17/2025    EGFRIFNONA 74 12/09/2021    BCR 10.5  02/17/2025    ANIONGAP 14.0 02/17/2025      Lab Results   Component Value Date    ALBUMIN 4.3 02/16/2025     US Renal Bilateral    Result Date: 2/17/2025  US RENAL BILATERAL Date of Exam: 2/17/2025 12:55 PM EST Indication: elevated BUN/creatinine. Comparison: No comparisons available. Technique: Grayscale and color Doppler ultrasound evaluation of the kidneys and urinary bladder was performed. Findings: RIGHT kidney measures 9.1 cm.  Kidney echogenicity, size, and vascularity appear within normal limits. There is no solid kidney mass.  No echogenic shadowing stone.  No hydronephrosis. LEFT kidney measures 9.4 cm. Kidney echogenicity, size, and vascularity appear within normal limits. There is no solid kidney mass.  No echogenic shadowing stone.  No hydronephrosis. There is a benign cyst measuring 1.1 x 0.8 x 0.8 cm. Limited visualization of the urinary bladder is unremarkable. Volume is 73 mL.     Impression: Impression: Benign left renal cyst otherwise unremarkable exam. Electronically Signed: Codie Garcia MD  2/17/2025 1:44 PM EST  Workstation ID: [id number]      Assessment / Plan     ASSESSMENT:  Sid-prerenal, due to hypovolemia in the setting of her uti most likely  Hypovolemia-due to decreased po intake  Hypotension-due to ramipril and hypovolemia  Uti with sepsis-on empiric abx  Altered mental status    PLAN:  Renal us ordered will review  Avoid nephrotoxins  Renally dose medicines  Continue ivf  Check lab in am    Thank you for involving us in the care of Radha Barbour.  Please feel free to call with any questions.    Fausto Guo MD  02/17/25  17:01 EST    [facility]  [phone]

## 2025-02-17 NOTE — NURSING NOTE
Pt orthostatic vitals are as follows  Laying 126/63 HR 85 Sitting 105/50 HR 90 unable to get standing because pt had near syncopal episodMD notie

## 2025-02-17 NOTE — PROGRESS NOTES
[facility] MEDICINE SERVICE  DAILY PROGRESS NOTE    NAME: Radha Barbour  : 1950  MRN: [id number]      LOS: 0 days     PROVIDER OF SERVICE: Sienna Machado PA-C    Chief Complaint: UTI (urinary tract infection)    Subjective:     Interval History:    Patient seen and evaluated at bedside.   Patient feeling better than yesterday.  She denies any flank pain, fevers, dysuria.  Treatment plan discussed with patient. All questions addressed.     Review of Systems: Positive for generalized weakness, confusion, cough, shortness of breath  Denies fevers, chills  Denies chest pain, edema  Denies wheezing  Denies nausea, vomiting, diarrhea  Denies dysuria, hematuria    Objective:     Vital Signs  Temp:  [98.8 °F (37.1 °C)-99.1 °F (37.3 °C)] 98.8 °F (37.1 °C)  Heart Rate:  [] 79  Resp:  [12-21] 21  BP: (101-129)/(49-82) 116/52   Body mass index is 29.95 kg/m².    Physical Exam   General: No acute distress, alert and oriented  CV: RRR, no peripheral edema  Pulm: CTAB, no increased work of breathing  Abd: Soft, nontender, nondistended, no CVA tenderness  Skin: No rashes or lesions on exposed skin  Psych: Appropriate mood and affect    Scheduled Meds   aspirin, 81 mg, Oral, Daily  atorvastatin, 10 mg, Oral, Daily  budesonide-formoterol, 2 puff, Inhalation, BID - RT  buPROPion, 100 mg, Oral, BID  cefTRIAXone, 1,000 mg, Intravenous, Q24H  cholecalciferol, 1,000 Units, Oral, Daily  clopidogrel, 75 mg, Oral, Daily  donepezil, 10 mg, Oral, Nightly  enoxaparin, 30 mg, Subcutaneous, Q24H  gabapentin, 100 mg, Oral, TID  [Held by provider] hydrOXYzine, 10 mg, Oral, Daily  insulin lispro, 2-7 Units, Subcutaneous, 4x Daily AC & at Bedtime  levothyroxine, 100 mcg, Oral, Q AM  memantine, 5 mg, Oral, BID  pantoprazole, 40 mg, Oral, Q AM  sodium chloride, 10 mL, Intravenous, Q12H  venlafaxine XR, 150 mg, Oral, Daily  vitamin B-12, 1,000 mcg, Oral, Daily       PRN Meds     albuterol sulfate HFA    aluminum-magnesium  hydroxide-simethicone    senna-docusate sodium **AND** polyethylene glycol **AND** bisacodyl **AND** bisacodyl    dextrose    dextrose    glucagon (human recombinant)    ipratropium-albuterol    meclizine    melatonin    ondansetron ODT **OR** ondansetron    Pharmacy to Dose enoxaparin (LOVENOX)    rOPINIRole    [COMPLETED] Insert Peripheral IV **AND** sodium chloride    sodium chloride    sodium chloride   Infusions  Pharmacy to Dose enoxaparin (LOVENOX),   sodium chloride, 100 mL/hr, Last Rate: 100 mL/hr (02/16/25 2210)          Diagnostic Data    Results from last 7 days   Lab Units 02/17/25  0116 02/16/25 2032   WBC 10*3/mm3 9.90 11.15*   HEMOGLOBIN g/dL 12.4 14.5   HEMATOCRIT % 39.6 46.5   PLATELETS 10*3/mm3 400 423   GLUCOSE mg/dL 234* 125*   CREATININE mg/dL 2.37* 2.35*   BUN mg/dL 25* 24*   SODIUM mmol/L 137 140   POTASSIUM mmol/L 3.1* 3.9   AST (SGOT) U/L  --  26   ALT (SGPT) U/L  --  10   ALK PHOS U/L  --  87   BILIRUBIN mg/dL  --  0.3   ANION GAP mmol/L 14.0 16.3*       CT Head Without Contrast    Result Date: 2/16/2025  Impression: Atrophy and chronic microvascular ischemic change. No acute intracranial process. Electronically Signed: Byron Shi MD  2/16/2025 10:20 PM EST  Workstation ID: [id number]    XR Chest 1 View    Result Date: 2/16/2025  Impression: No active disease. Electronically Signed: Byron Shi MD  2/16/2025 9:23 PM EST  Workstation ID: [id number]     Interval results reviewed.    Assessment/Plan:     UTI  -Continue IV ceftriaxone, she has a history of recurrent UTIs, will await urine culture.  She does not meet SIRS/sepsis criteria.    MELODY  -Renal function is about the same today; creatinine 2.37, BUN 25.  Baseline creatinine around 0.9.  She was on continuous IV fluids overnight without improvement in renal function.  -Obtain renal ultrasound, urine studies, uric acid, CK.  -Hold ramipril  -Consult nephrology    Rhinovirus  -Supportive treatment    Generalized weakness  -Likely secondary  to all the above  -PT/OT eval pending    Transient episode of altered mental status  -Confusion appears to be resolved at this point.  It appears she is on donepezil and memantine at home for history of cognitive impairment.  -Will renal dose gabapentin and hold hydroxyzine    Hypertension  -Antihypertensives held due to borderline low blood pressures on admission.  She is normotensive at this time, will continue to hold antihypertensives for the time being.    CAD  -Continue statin, aspirin, Plavix    T2DM  -Hold metformin  -SSI ACHS  -CCD  -Hypoglycemia protocol in place    Hypothyroidism  -Continue levothyroxine      Treatment plan discussed with nursing staff, case management and pharmacy.     VTE Prophylaxis:  Pharmacologic & mechanical VTE prophylaxis orders are present.         Code status is   Code Status and Medical Interventions: CPR (Attempt to Resuscitate); Full Support   Ordered at: 02/16/25 9550     Level Of Support Discussed With:    Patient     Code Status (Patient has no pulse and is not breathing):    CPR (Attempt to Resuscitate)     Medical Interventions (Patient has pulse or is breathing):    Full Support       Plan for disposition: Pending PT/OT evaluation and recommendations.    Barriers to discharge: Ongoing treatment of UTI, awaiting urine culture.  Awaiting improvement in renal function    Time: 35+ minutes     Signature: Electronically signed by Sienna Machado PA-C, 02/17/25, 13:04 EST.  [facility]

## 2025-02-17 NOTE — PLAN OF CARE
Problem: Adult Inpatient Plan of Care  Goal: Plan of Care Review  Outcome: Progressing  Goal: Patient-Specific Goal (Individualized)  Outcome: Progressing  Goal: Absence of Hospital-Acquired Illness or Injury  Outcome: Progressing  Intervention: Identify and Manage Fall Risk  Recent Flowsheet Documentation  Taken 2/17/2025 1000 by Veronica Duran RN  Safety Promotion/Fall Prevention: safety round/check completed  Taken 2/17/2025 0800 by Veronica Duran RN  Safety Promotion/Fall Prevention: safety round/check completed  Intervention: Prevent Skin Injury  Recent Flowsheet Documentation  Taken 2/17/2025 1000 by Veronica Duran RN  Body Position: position changed independently  Goal: Optimal Comfort and Wellbeing  Outcome: Progressing  Intervention: Provide Person-Centered Care  Recent Flowsheet Documentation  Taken 2/17/2025 0800 by Veronica Duran RN  Trust Relationship/Rapport:   care explained   choices provided   emotional support provided   empathic listening provided   questions answered   questions encouraged   reassurance provided   thoughts/feelings acknowledged  Goal: Readiness for Transition of Care  Outcome: Progressing     Problem: Fall Injury Risk  Goal: Absence of Fall and Fall-Related Injury  Outcome: Progressing  Intervention: Promote Injury-Free Environment  Recent Flowsheet Documentation  Taken 2/17/2025 1000 by Veronica Duran RN  Safety Promotion/Fall Prevention: safety round/check completed  Taken 2/17/2025 0800 by Veronica Duran RN  Safety Promotion/Fall Prevention: safety round/check completed     Problem: Violence Risk or Actual  Goal: Anger and Impulse Control  Outcome: Progressing

## 2025-02-17 NOTE — CASE MANAGEMENT/SOCIAL WORK
Discharge Planning Assessment   [facility]     Patient Name: Radha Barbour  MRN: [id number]  Today's Date: 2/17/2025    Admit Date: 2/16/2025    Plan: Home with family   Discharge Needs Assessment       Row Name 02/17/25 1611       Living Environment    People in Home sibling(s);other (see comments)    Name(s) of People in Home Lives with Sister Binu, Brother in -Zia and their son and son's wife.    Unique Family Situation Lives with Sister Binu, Brother in law-Zia and their son and son's wife.    Current Living Arrangements home    Potentially Unsafe Housing Conditions none    In the past 12 months has the electric, gas, oil, or water company threatened to shut off services in your home? No    Primary Care Provided by self    Provides Primary Care For no one    Family Caregiver if Needed sibling(s);child(jesus), adult    Family Caregiver Names Binu-Sister, Daughter Linda Robertson    Quality of Family Relationships helpful;involved;supportive    Able to Return to Prior Arrangements yes       Resource/Environmental Concerns    Resource/Environmental Concerns none    Transportation Concerns none       Transportation Needs    In the past 12 months, has lack of transportation kept you from medical appointments or from getting medications? no    In the past 12 months, has lack of transportation kept you from meetings, work, or from getting things needed for daily living? No       Food Insecurity    Within the past 12 months, you worried that your food would run out before you got the money to buy more. Never true    Within the past 12 months, the food you bought just didn't last and you didn't have money to get more. Never true       Transition Planning    Patient/Family Anticipates Transition to home with family    Patient/Family Anticipated Services at Transition     Transportation Anticipated family or friend will provide       Discharge Needs Assessment    Equipment Currently Used at Home walker,  rolling    Concerns to be Addressed discharge planning    Anticipated Changes Related to Illness none    Equipment Needed After Discharge none                Discharge Plan       Row Name 02/17/25 1616       Plan    Plan Home with family    Patient/Family in Agreement with Plan yes    Plan Comments CM met with patient at the bedside to review discharge planning. Patient lives with her sister, brother in law, their son and daughter in law.  Pt. requires assistance with laundry and meals.  Sister or daughter Linda Robertson to transport patient at d/c. Confirmed PCP, insurance, and pharmacy. Patient declines M2B, updated in EPIC. Patient denies any difficulty affording food, utilities, or medications. Patient is not current with any HHC/OPPT/OT services. DC Barriers: Nephrology Consult, IVF, IV Rocephin, lab, neuro PT/OT eval.              Demographic Summary       Row Name 02/17/25 1607       General Information    Admission Type inpatient    Arrived From home    Required Notices Provided Important Message from Medicare    Referral Source admission list    Reason for Consult discharge planning    Preferred Language English       Contact Information    Permission Granted to Share Info With ;family/designee              Functional Status       Row Name 02/17/25 1608       Functional Status    Usual Activity Tolerance moderate    Current Activity Tolerance fair       Functional Status, IADL    Medications independent    Meal Preparation completely dependent  Brother in law-Zia    Housekeeping completely dependent  Brother in law-Zia    Laundry assistive person  All assist    Shopping assistive person  All assist              Patient Forms       Row Name 02/17/25 1604       Patient Forms    Important Message from Medicare (IMM) Delivered  2/17/25 IMM per registration    Delivered to Patient    Method of delivery In person     Cheyenne Hernandez RN    [address]  [place] [address]  Phone: [phone]  Fax: [phone]

## 2025-02-17 NOTE — PLAN OF CARE
Goal Outcome Evaluation:  Plan of Care Reviewed With: patient        Progress: no change  Outcome Evaluation: 74 y.o. female with PMH of HTN, HLD, CAD, DM type II, dementia, hypothyroidism presented to  ED 2/16/2025 with family who stated the patient had an episode of altered mental status this afternoon. She had an incontinent episode of stool this afternoon and after cleaning her up the daughter went to sit her in the chair and the patient had an episode where she was poorly responsive. EMS was called and she had a low BP per the daughter. She was just discharged from rehab two days ago and pt reports she was okay at first but quickly became very lethargic and foggy. Pt is (+) UTI and Rhinovirus. She lives with her sister & MIREILLE in a home with several steps to enter and patient's living quarters are then in the basement. Pt has been very weak with trying to stand up, but can move herself around in the bed in supine with good strength. She is grossly oriented but forgetful and aware of this. Pt needs assist for ADl and basic mobility at this time is not safe to go home with ffamily. recommend SNF for addittional rehab.    Anticipated Discharge Disposition (OT): skilled nursing facility

## 2025-02-17 NOTE — THERAPY EVALUATION
Patient Name: Radha Barbour  : 1950    MRN: [id number]                              Today's Date: 2025       Admit Date: 2025    Visit Dx:     ICD-10-CM ICD-9-CM   1. Renal insufficiency, mild  N28.9 593.9   2. Weakness generalized  R53.1 780.79   3. UTI (urinary tract infection) with pyuria  N39.0 599.0     Patient Active Problem List   Diagnosis    B12 deficiency    Back pain, chronic    Depression    Diabetic peripheral neuropathy    Restless leg syndrome    Osteoporosis    OAB (overactive bladder)    Lumbosacral radiculopathy    Insomnia    Hypothyroidism    HTN, goal below 130/80    Gastric reflux    Environmental and seasonal allergies    Dyslipidemia    DM type 2, goal HbA1c < 7%    Angina pectoris    SOB (shortness of breath)    Abnormal results of cardiovascular function studies    Dizziness    Cognitive impairment    Vitamin D deficiency    Diabetes mellitus    Primary hypertension    Anxiety    Neuropathy    Preventative health care    Coronary artery disease involving native heart without angina pectoris    Screening mammogram for breast cancer    Frequent falls    Bronchitis    Urinary tract infection without hematuria    Chronic obstructive pulmonary disease    Need for influenza vaccination    Anxiety and depression    Acute right-sided low back pain without sciatica    Encounter for subsequent annual wellness visit in Medicare patient    TIA (transient ischemic attack)    Asymptomatic menopause    Encounter for screening for osteoporosis    UTI (urinary tract infection)    MELODY (acute kidney injury)     Past Medical History:   Diagnosis Date    Anxiety     Arthritis     B12 deficiency 2016    Last Assessment & Plan:  Formatting of this note might be different from the original.  Compliant and controlled with current medication B 12 supplements . Lab today B 12    Back pain, chronic 2016    Last Assessment & Plan:  Formatting of this note might be different from  the original. Will refer to PT    Cancer 2009    Colon    Colon polyp 2005    Colon Cancer    Coronary artery disease involving native heart without angina pectoris 06/02/2022    Dementia 2021    Doctor prescribed medicine for dementia/memory loss    Depression 07/08/2016    Last Assessment & Plan:  Formatting of this note might be different from the original.  Compliant and controlled with current medication    Diabetes mellitus 06/02/2022    Diabetic peripheral neuropathy 02/14/2020    Last Assessment & Plan:  Formatting of this note might be different from the original. Stable    Difficulty walking 2022    Extreme unsteadiness on feet    DM type 2, goal HbA1c < 7% 03/27/2017    Last Assessment & Plan:  Formatting of this note might be different from the original.  Compliant and controlled with current diet.llabs today A1c    Dyslipidemia 10/11/2017    Last Assessment & Plan:  Formatting of this note might be different from the original.  Compliant and controlled with current medication/statin therapy/labs today    Environmental and seasonal allergies 10/11/2019    Gastric reflux 03/27/2017    HTN, goal below 130/80 02/10/2016    Last Assessment & Plan:  Formatting of this note might be different from the original. Compliant and controlled with current medication ramipril /labs today cmp to check renal function and electrolytes    Hyperlipidemia     Hypothyroidism 1990    Last Assessment & Plan:  Formatting of this note might be different from the original. TSH low reduce levothyroxine.  Will check TSH today.    Insomnia 01/16/2015    Lumbosacral radiculopathy 02/14/2020    Last Assessment & Plan:  Formatting of this note might be different from the original.  Compliant and controlled with current medication Muscle relaxer    Memory loss 2020    Osteoporosis 05/26/2020    Formatting of this note is different from the original. RADIOLOGY REPORT   FACILITY:  [facility] UN[facility]/AGE/GENDER: [id number]  OP       AGE:69 Y          SEX:F PATIENT NAME/:  GM DERAS    1950 UNIT NUMBER:  [id number] ACCOUNT NUMBER:  [id number] ACCESSION NUMBER:  [id number]     EXAMINATION: Bone densitometry of multiple sites, lumbar spine, left hip and distal    Restless leg syndrome 2018    Last Assessment & Plan:  Formatting of this note might be different from the original.  Compliant and controlled with current medication requip    Shortness of breath     TIA (transient ischemic attack) 3/28/2024     Past Surgical History:   Procedure Laterality Date    BACK SURGERY  2007    lower    CARDIAC CATHETERIZATION Right 04/15/2022    Procedure: Left Heart Cath;  Surgeon: Laila Alvarez MD;  Location: [facility] CATH INVASIVE LOCATION;  Service: Cardiovascular;  Laterality: Right;    CARDIAC CATHETERIZATION  4/15/2022    COLON SURGERY      REPLACEMENT TOTAL KNEE Left     redone in     REPLACEMENT TOTAL KNEE Right       General Information       Row Name 25 1428          Physical Therapy Time and Intention    Document Type evaluation  -DYLLAN     Mode of Treatment physical therapy  -DYLLAN       Row Name 25 1428          General Information    Patient Profile Reviewed yes  -DYLLAN     Prior Level of Function independent:;all household mobility  Pt reports she is IND with mobility and ADLs and sometimes uses RW. She drives sometimes.  -DYLLAN     Existing Precautions/Restrictions orthostatic hypotension  -DYLLAN     Barriers to Rehab none identified  -DYLLAN       Row Name 25 1428          Living Environment    People in Home sibling(s)  sister and MIREILLE  -DYLLAN     Name(s) of People in Home sister (Carolin) and MIREILLE  -DYLLAN       Row Name 25 1428          Home Main Entrance    Number of Stairs, Main Entrance two  -DYLLAN     Stair Railings, Main Entrance railings safe and in good condition  -DYLLAN       Row Name 25 1428          Stairs Within Home, Primary    Stairs, Within Home, Primary --  Pt does not have  to use second floor.  -       Row Name 02/17/25 1428          Cognition    Orientation Status (Cognition) oriented x 4  -       Row Name 02/17/25 1428          Safety Issues/Impairments Affecting Functional Mobility    Safety Issues Affecting Function (Mobility) insight into deficits/self-awareness  -     Impairments Affecting Function (Mobility) balance;postural/trunk control;strength;endurance/activity tolerance  -               User Key  (r) = Recorded By, (t) = Taken By, (c) = Cosigned By      Initials Name Provider Type    Tabatha Chaney PT Physical Therapist                   Mobility       Row Name 02/17/25 1433          Bed Mobility    Bed Mobility bed mobility (all) activities  -DYLLAN     All Activities, [place]ansom (Bed Mobility) standby assist  -DYLLAN     Assistive Device (Bed Mobility) head of bed elevated  -DYLLAN     Comment, (Bed Mobility) Sits EOB with SBA. Pt with post lean and needs frequent cues to correct.  -       Row Name 02/17/25 1433          Sit-Stand Transfer    Sit-Stand Ransom (Transfers) contact guard  -DYLLAN     Assistive Device (Sit-Stand Transfers) other (see comments)  -DYLLAN     Comment, (Sit-Stand Transfer) Pt performed 2 trials of SIT<>STAND from EOB with HHA for balance. Pt with sway and posterior lean but seems unaware of post lean.  -       Row Name 02/17/25 1433          Gait/Stairs (Locomotion)    Ransom Level (Gait) other (see comments)  -DYLLAN     Comment, (Gait/Stairs) Pt able to march in place with CGA-MIN for balance but unsafe to amb due to weakness and post lean  -               User Key  (r) = Recorded By, (t) = Taken By, (c) = Cosigned By      Initials Name Provider Type    Tabatha Chaney, TIM Physical Therapist                   Obj/Interventions       Row Name 02/17/25 1434          Range of Motion Comprehensive    General Range of Motion no range of motion deficits identified  -       Row Name 02/17/25 1434          Strength Comprehensive (MMT)     Comment, General Manual Muscle Testing (MMT) Assessment globalized weakness with functional mobility, but 4+/5 with LE MMT  -DYLLAN       Row Name 02/17/25 1434          Balance    Balance Assessment sitting static balance;sitting dynamic balance;standing static balance;standing dynamic balance  -DYLLAN     Static Sitting Balance contact guard;minimal assist  -DYLLAN     Dynamic Sitting Balance contact guard;minimal assist  -DYLLAN     Position, Sitting Balance unsupported;sitting edge of bed  -DYLLAN     Static Standing Balance minimal assist;contact guard  -DYLLAN     Dynamic Standing Balance minimal assist  -DYLLAN     Position/Device Used, Standing Balance other (see comments)  with HHA  -DYLLAN       Row Name 02/17/25 1434          Sensory Assessment (Somatosensory)    Sensory Assessment (Somatosensory) sensation intact  -DYLLAN               User Key  (r) = Recorded By, (t) = Taken By, (c) = Cosigned By      Initials Name Provider Type    Tabatha Chaney, PT Physical Therapist                   Goals/Plan       Row Name 02/17/25 1439          Bed Mobility Goal 1 (PT)    Activity/Assistive Device (Bed Mobility Goal 1, PT) bed mobility activities, all  -DYLLAN     [place] Level/Cues Needed (Bed Mobility Goal 1, PT) independent  -DYLLAN     Time Frame (Bed Mobility Goal 1, PT) 2 weeks  -DYLLAN       Row Name 02/17/25 1439          Transfer Goal 1 (PT)    Activity/Assistive Device (Transfer Goal 1, PT) transfers, all  -DYLLAN     [place] Level/Cues Needed (Transfer Goal 1, PT) modified independence  -DYLLAN     Time Frame (Transfer Goal 1, PT) 2 weeks  -DYLLAN       Row Name 02/17/25 1439          Gait Training Goal 1 (PT)    Activity/Assistive Device (Gait Training Goal 1, PT) gait (walking locomotion)  -DYLLAN     [place] Level (Gait Training Goal 1, PT) modified independence  -DYLLAN     Distance (Gait Training Goal 1, PT) 100  -DYLLAN     Time Frame (Gait Training Goal 1, PT) 2 weeks  -DYLLAN       Row Name 02/17/25 1439          Stairs Goal 1 (PT)    Activity/Assistive  Device (Stairs Goal 1, PT) stairs, all skills  -DYLLAN     [place] Level/Cues Needed (Stairs Goal 1, PT) standby assist  -DYLLAN     Number of Stairs (Stairs Goal 1, PT) 2  -DYLLAN     Time Frame (Stairs Goal 1, PT) 2 weeks  -DYLLAN       Row Name 02/17/25 1436          Therapy Assessment/Plan (PT)    Planned Therapy Interventions (PT) balance training;gait training;bed mobility training;patient/family education;postural re-education;strengthening;stair training;transfer training  -DYLLAN               User Key  (r) = Recorded By, (t) = Taken By, (c) = Cosigned By      Initials Name Provider Type    DYLLAN Tabatha Machado, PT Physical Therapist                   Clinical Impression       Row Name 02/17/25 1436          Pain    Pretreatment Pain Rating 0/10 - no pain  -DYLLAN     Posttreatment Pain Rating 0/10 - no pain  -DYLLAN       Row Name 02/17/25 1436          Plan of Care Review    Plan of Care Reviewed With patient  -DYLLAN     Outcome Evaluation Radha Barbour is a 74 y.o. female with PMH of HTN, HLD, CAD, DM type II, dementia, hypothyroidism presented to  ED 2/16/2025 with family who stated the patient had an episode of altered mental status. Pt (+) for UTI  and with syncopal episodes. Pt lives at home with sister and MIREILLE and is typically ind/mod I with mobility and ADLs. Pt currently presents with generalized weakness and demonstrates mild posterior lean with all mobility. Pt asympomatic for orthostatic hypotension today. Pt would benefit from skilled PT to increase functional mobility. Would recommend SNF based on initial eval, but pt has potential to progress to home with assist.  -DYLLAN       Row Name 02/17/25 1431          Therapy Assessment/Plan (PT)    Rehab Potential (PT) good  -DYLLAN     Criteria for Skilled Interventions Met (PT) yes;meets criteria;skilled treatment is necessary  -DYLLAN     Therapy Frequency (PT) 5 times/wk  -DYLLAN     Predicted Duration of Therapy Intervention (PT) DC  -DYLLAN       Row Name 02/17/25 4111           Positioning and Restraints    Pre-Treatment Position in bed  -DYLLAN     Post Treatment Position bed  -DYLLAN     In Bed notified nsg;fowlers;call light within reach;side rails up x2;encouraged to call for assist  -DYLLAN               User Key  (r) = Recorded By, (t) = Taken By, (c) = Cosigned By      Initials Name Provider Type    Tabatha Chaney, PT Physical Therapist                   Outcome Measures       Row Name 02/17/25 1442 02/17/25 1200       How much help from another person do you currently need...    Turning from your back to your side while in flat bed without using bedrails? 4  -DYLLAN 4  -KB    Moving from lying on back to sitting on the side of a flat bed without bedrails? 3  -DYLLAN 4  -KB    Moving to and from a bed to a chair (including a wheelchair)? 3  -DYLLAN 2  -KB    Standing up from a chair using your arms (e.g., wheelchair, bedside chair)? 3  -DYLLAN 2  -KB    Climbing 3-5 steps with a railing? 1  -DYLLAN 1  -KB    To walk in hospital room? 2  -DYLLAN 1  -KB    AM-PAC 6 Clicks Score (PT) 16  -DYLLAN 14  -KB    Highest Level of Mobility Goal 5 --> Static standing  -DYLLAN 4 --> Transfer to chair/commode  -KB      Row Name 02/17/25 0806 02/17/25 0800       How much help from another person do you currently need...    Turning from your back to your side while in flat bed without using bedrails? 4  -ER 4  -KB    Moving from lying on back to sitting on the side of a flat bed without bedrails? 4  -ER 4  -KB    Moving to and from a bed to a chair (including a wheelchair)? 2  -ER 2  -KB    Standing up from a chair using your arms (e.g., wheelchair, bedside chair)? 2  -ER 2  -KB    Climbing 3-5 steps with a railing? 1  -ER 1  -KB    To walk in hospital room? 1  -ER 1  -KB    AM-PAC 6 Clicks Score (PT) 14  -ER 14  -KB    Highest Level of Mobility Goal 4 --> Transfer to chair/commode  -ER 4 --> Transfer to chair/commode  -KB      Row Name 02/17/25 1442          Functional Assessment    Outcome Measure Options AM-PAC 6 Clicks Basic Mobility  (PT)  -               User Key  (r) = Recorded By, (t) = Taken By, (c) = Cosigned By      Initials Name Provider Type    DYLLAN Tabatha Machado, TIM Physical Therapist    Veronica Schaeffer, RN Registered Nurse    Jennifer Hernandez RN Registered Nurse                                 Physical Therapy Education       Title: PT OT SLP Therapies (Done)       Topic: Physical Therapy (Done)       Point: Mobility training (Done)       Learning Progress Summary            Patient Acceptance, E,TB, VU by  at 2/17/2025 1442                      Point: Home exercise program (Done)       Learning Progress Summary            Patient Acceptance, E,TB, VU by  at 2/17/2025 1442                      Point: Body mechanics (Done)       Learning Progress Summary            Patient Acceptance, E,TB, VU by  at 2/17/2025 1442                      Point: Precautions (Done)       Learning Progress Summary            Patient Acceptance, E,TB, VU by  at 2/17/2025 1442                                      User Key       Initials Effective Dates Name Provider Type Discipline     01/06/25 -  Tabatha Machado, PT Physical Therapist PT                  PT Recommendation and Plan  Planned Therapy Interventions (PT): balance training, gait training, bed mobility training, patient/family education, postural re-education, strengthening, stair training, transfer training  Outcome Evaluation: Radha Barbour is a 74 y.o. female with PMH of HTN, HLD, CAD, DM type II, dementia, hypothyroidism presented to  ED 2/16/2025 with family who stated the patient had an episode of altered mental status. Pt (+) for UTI  and with syncopal episodes. Pt lives at home with sister and MIREILLE and is typically ind/mod I with mobility and ADLs. Pt currently presents with generalized weakness and demonstrates mild posterior lean with all mobility. Pt asympomatic for orthostatic hypotension today. Pt would benefit from skilled PT to increase functional mobility. Would  recommend SNF based on initial eval, but pt has potential to progress to home with assist.     Time Calculation:   PT Evaluation Complexity  History, PT Evaluation Complexity: 1-2 personal factors and/or comorbidities  Examination of Body Systems (PT Eval Complexity): total of 3 or more elements  Clinical Presentation (PT Evaluation Complexity): stable  Clinical Decision Making (PT Evaluation Complexity): low complexity  Overall Complexity (PT Evaluation Complexity): low complexity     PT Charges       Row Name 02/17/25 1443             Time Calculation    Start Time 1040  -DYLLAN      Stop Time 1100  -DYLLAN      Time Calculation (min) 20 min  -DYLLAN      PT Received On 02/17/25  -DYLLAN      PT - Next Appointment 02/18/25  -DYLLAN      PT Goal Re-Cert Due Date 03/03/25  -DYLLAN         Time Calculation- PT    Total Timed Code Minutes- PT 0 minute(s)  -DYLLAN                User Key  (r) = Recorded By, (t) = Taken By, (c) = Cosigned By      Initials Name Provider Type    Tabatha Chaney, PT Physical Therapist                  Therapy Charges for Today       Code Description Service Date Service Provider Modifiers Qty    22113638759 HC PT EVAL LOW COMPLEXITY 4 2/17/2025 Tabatha Machado, PT GP 1            PT G-Codes  Outcome Measure Options: AM-PAC 6 Clicks Basic Mobility (PT)  AM-PAC 6 Clicks Score (PT): 16  PT Discharge Summary  Anticipated Discharge Disposition (PT): skilled nursing facility (May progress to home with assist)    Tabatha Machado PT  2/17/2025

## 2025-02-17 NOTE — ED PROVIDER NOTES
Subjective   History of Present Illness  Patient is a 74-year-old female who comes in from home after having a recent admission which when she left she went to rehabilitation the daughter and son at bedside states that the patient went to rehab and was discharged on Friday 2 days ago and since then has not been able to take care of herself at home today she has had increased altered mental status and defecated on herself prior to them calling EMS family cleaned her out.    She reports that she has no pain and states that she just does not feel like eating or drink      Review of Systems   Constitutional:  Positive for appetite change. Negative for chills, fatigue and fever.   HENT:  Negative for congestion, tinnitus and trouble swallowing.    Eyes:  Negative for photophobia, discharge and redness.   Respiratory:  Negative for cough and shortness of breath.    Cardiovascular:  Negative for chest pain and palpitations.   Gastrointestinal:  Negative for abdominal pain, diarrhea, nausea and vomiting.   Genitourinary:  Negative for dysuria, frequency and urgency.   Musculoskeletal:  Negative for back pain, joint swelling and myalgias.   Skin:  Negative for rash.   Neurological:  Positive for weakness. Negative for dizziness and headaches.   Psychiatric/Behavioral:  Negative for confusion.    All other systems reviewed and are negative.      Past Medical History:   Diagnosis Date    Anxiety     Arthritis 1998    B12 deficiency 07/08/2016    Last Assessment & Plan:  Formatting of this note might be different from the original.  Compliant and controlled with current medication B 12 supplements . Lab today B 12    Back pain, chronic 07/08/2016    Last Assessment & Plan:  Formatting of this note might be different from the original. Will refer to PT    Cancer 2009    Colon    Colon polyp 2005    Colon Cancer    Coronary artery disease involving native heart without angina pectoris 06/02/2022    Dementia 2021    Doctor  prescribed medicine for dementia/memory loss    Depression 2016    Last Assessment & Plan:  Formatting of this note might be different from the original.  Compliant and controlled with current medication    Diabetes mellitus 2022    Diabetic peripheral neuropathy 2020    Last Assessment & Plan:  Formatting of this note might be different from the original. Stable    Difficulty walking     Extreme unsteadiness on feet    DM type 2, goal HbA1c < 7% 2017    Last Assessment & Plan:  Formatting of this note might be different from the original.  Compliant and controlled with current diet.llabs today A1c    Dyslipidemia 10/11/2017    Last Assessment & Plan:  Formatting of this note might be different from the original.  Compliant and controlled with current medication/statin therapy/labs today    Environmental and seasonal allergies 10/11/2019    Gastric reflux 2017    HTN, goal below 130/80 02/10/2016    Last Assessment & Plan:  Formatting of this note might be different from the original. Compliant and controlled with current medication ramipril /labs today cmp to check renal function and electrolytes    Hyperlipidemia     Hypothyroidism     Last Assessment & Plan:  Formatting of this note might be different from the original. TSH low reduce levothyroxine.  Will check TSH today.    Insomnia 2015    Lumbosacral radiculopathy 2020    Last Assessment & Plan:  Formatting of this note might be different from the original.  Compliant and controlled with current medication Muscle relaxer    Memory loss     Osteoporosis 2020    Formatting of this note is different from the original. RADIOLOGY REPORT   FACILITY:  [facility]/AGE/GENDER: [id number]  OP      AGE:69 Y          SEX:F PATIENT NAME/:  GM DERAS    1950 UNIT NUMBER:  [id number] ACCOUNT NUMBER:  [id number] ACCESSION NUMBER:  [id number]     EXAMINATION: Bone densitometry  of multiple sites, lumbar spine, left hip and distal    Restless leg syndrome 2018    Last Assessment & Plan:  Formatting of this note might be different from the original.  Compliant and controlled with current medication requip    Shortness of breath     TIA (transient ischemic attack) 3/28/2024       No Known Allergies    Past Surgical History:   Procedure Laterality Date    BACK SURGERY  2007    lower    CARDIAC CATHETERIZATION Right 04/15/2022    Procedure: Left Heart Cath;  Surgeon: Laila Alvarez MD;  Location: [facility] CATH INVASIVE LOCATION;  Service: Cardiovascular;  Laterality: Right;    CARDIAC CATHETERIZATION  4/15/2022    COLON SURGERY  2006    REPLACEMENT TOTAL KNEE Left     redone in     REPLACEMENT TOTAL KNEE Right        Family History   Problem Relation Age of Onset    Hypertension Mother     Arthritis Mother     Colon cancer Mother     Thyroid disease Mother     Stroke Mother     Cancer Mother         Colon    Dementia Mother     Heart disease Sister     Heart disease Sister     Dementia Maternal Aunt     Dementia Maternal Aunt        Social History     Socioeconomic History    Marital status:    Tobacco Use    Smoking status: Former     Current packs/day: 0.00     Average packs/day: 2.0 packs/day for 20.0 years (40.0 ttl pk-yrs)     Types: Cigarettes     Start date: 1978     Quit date: 1998     Years since quittin.1    Smokeless tobacco: Never   Vaping Use    Vaping status: Never Used   Substance and Sexual Activity    Alcohol use: Yes     Comment: socially    Drug use: Never     Comment: CBD oils    Sexual activity: Not Currently     Partners: Male     Birth control/protection: Post-menopausal, None           Objective   Physical Exam  Vitals reviewed.   Constitutional:       Appearance: She is well-developed. She is obese. She is ill-appearing.   HENT:      Head: Normocephalic and atraumatic.      Mouth/Throat:      Mouth: Mucous membranes are dry.  "  Eyes:      Conjunctiva/sclera: Conjunctivae normal.      Pupils: Pupils are equal, round, and reactive to light.   Cardiovascular:      Rate and Rhythm: Normal rate and regular rhythm.      Heart sounds: Normal heart sounds.   Pulmonary:      Effort: Pulmonary effort is normal. No respiratory distress.      Breath sounds: Normal breath sounds. Examination of the right-lower field reveals decreased breath sounds. Examination of the left-lower field reveals decreased breath sounds. No wheezing.   Abdominal:      General: Bowel sounds are normal.      Palpations: Abdomen is soft.      Tenderness: There is no abdominal tenderness.   Musculoskeletal:         General: No deformity. Normal range of motion.      Cervical back: Normal range of motion and neck supple.   Skin:     General: Skin is warm and dry.      Capillary Refill: Capillary refill takes less than 2 seconds.   Neurological:      Mental Status: She is alert and oriented to person, place, and time.      GCS: GCS eye subscore is 4. GCS verbal subscore is 5. GCS motor subscore is 6.      Motor: No weakness.   Psychiatric:         Mood and Affect: Mood normal.         Behavior: Behavior normal.         Procedures         EKG was obtained and reviewed and read by myself as well as Dr. Costello as sinus rhythm with a rate of 73 there is some LVH there was no previous for comparison    ED Course  ED Course as of 02/16/25 2229   Sun Feb 16, 2025 2101 Marked ready for CT   [KW]   2224 Spoke with Elsie  [KW]      ED Course User Index  [KW] Belkis Richardson, APRN      /64   Pulse 75   Temp 98.8 °F (37.1 °C) (Oral)   Resp 16   Ht 165.1 cm (65\")   Wt 81.6 kg (180 lb)   SpO2 97%   BMI 29.95 kg/m²   Labs Reviewed   COMPREHENSIVE METABOLIC PANEL - Abnormal; Notable for the following components:       Result Value    Glucose 125 (*)     BUN 24 (*)     Creatinine 2.35 (*)     Anion Gap 16.3 (*)     eGFR 21.1 (*)     All other components within normal " limits    Narrative:     GFR Categories in Chronic Kidney Disease (CKD)      GFR Category          GFR (mL/min/1.73)    Interpretation  G1                     90 or greater         Normal or high (1)  G2                      60-89                Mild decrease (1)  G3a                   45-59                Mild to moderate decrease  G3b                   30-44                Moderate to severe decrease  G4                    15-29                Severe decrease  G5                    14 or less           Kidney failure          (1)In the absence of evidence of kidney disease, neither GFR category G1 or G2 fulfill the criteria for CKD.    eGFR calculation 2021 CKD-EPI creatinine equation, which does not include race as a factor   URINALYSIS W/ CULTURE IF INDICATED - Abnormal; Notable for the following components:    Appearance, UA Cloudy (*)     Ketones, UA Trace (*)     Blood, UA Large (3+) (*)     Protein, UA 30 mg/dL (1+) (*)     Leuk Esterase, UA Large (3+) (*)     All other components within normal limits    Narrative:     In absence of clinical symptoms, the presence of pyuria, bacteria, and/or nitrites on the urinalysis result does not correlate with infection.   CBC WITH AUTO DIFFERENTIAL - Abnormal; Notable for the following components:    WBC 11.15 (*)     MCHC 31.2 (*)     Lymphocyte % 15.5 (*)     Immature Grans % 2.2 (*)     Neutrophils, Absolute 7.64 (*)     Monocytes, Absolute 1.09 (*)     Immature Grans, Absolute 0.25 (*)     All other components within normal limits   URINALYSIS, MICROSCOPIC ONLY - Abnormal; Notable for the following components:    RBC, UA Too Numerous to Count (*)     WBC, UA Too Numerous to Count (*)     Bacteria, UA 4+ (*)     Squamous Epithelial Cells, UA 3-6 (*)     All other components within normal limits   CK - Normal   RESPIRATORY PANEL PCR W/ COVID-19 (SARS-COV-2), NP SWAB IN UTM/VTP, 2 HR TAT   URINE CULTURE   CBC AND DIFFERENTIAL    Narrative:     The following orders  were created for panel order CBC & Differential.  Procedure                               Abnormality         Status                     ---------                               -----------         ------                     CBC Auto Differential[[id number]]        Abnormal            Final result                 Please view results for these tests on the individual orders.     Medications   sodium chloride 0.9 % flush 10 mL (has no administration in time range)   sodium chloride 0.9 % infusion (100 mL/hr Intravenous New Bag 2/16/25 2210)   cefTRIAXone (ROCEPHIN) 2,000 mg in sodium chloride 0.9 % 100 mL MBP (2,000 mg Intravenous New Bag 2/16/25 2210)     CT Head Without Contrast    Result Date: 2/16/2025  Impression: Atrophy and chronic microvascular ischemic change. No acute intracranial process. Electronically Signed: Byron Shi MD  2/16/2025 10:20 PM EST  Workstation ID: [id number]    XR Chest 1 View    Result Date: 2/16/2025  Impression: No active disease. Electronically Signed: Byron Shi MD  2/16/2025 9:23 PM EST  Workstation ID: [id number]                                                    Medical Decision Making  Recent urine culture from admission at [facility] 1/27/2025  Lab Results - (ABNORMAL) Culture,Urine (05/20/2024 6:30 PM EDT)  Organism Antibiotic Method Susceptibility  Escherichia coli Amoxicillin/Clavulanate CESARIO <=8/4: Susceptible   Escherichia coli Ampicillin CESARIO >16: Resistant   Escherichia coli Ampicillin/Sulbactam CESARIO 16/8: Intermediate   Escherichia coli Cefazolin CESARIO <=2: Susceptible   Escherichia coli Ciprofloxacin CESARIO <=0.25: Susceptible   Escherichia coli Gentamicin ECSARIO <=2: Susceptible   Escherichia coli Nitrofurantoin CESARIO <=32: Susceptible   Escherichia coli Trimethoprim/Sulfamethoxazole CESARIO <=0.5/9.5: Susceptible       Patient is a 74-year-old female who comes in from home after having a recent hospitalization and then went to rehabilitation and has only been out for 2 days.  Today she has  weakness and feels she has urinary tract infection again.  IV was established and blood work was obtained and reviewed the patient was found to have increased BUN and creatinine of 24 and 2.35 which is elevated from the patient's baseline 3 months ago per chart review patient was started on some IV fluids her white count was 11.15 she was found to have a large urinary tract infection and chart review shows that on 1/27/2025 at [facility] she was found to be cultured E. coli and I used that cultured to choose Rocephin for her treatment.  She will be admitted to the hospitalist she was discussed with Olivia who agreed to admit I advised Olivia  that this patient will probably need long-term placement and should be seen by case management patient's family was agreeable to this plan of care patient was stable at admission...    Patient had a CT of the head as well as a chest x-ray here in the emergency room which was read by myself and Dr. Jeong and the radiologist as having no acute findings    Problems Addressed:  Renal insufficiency, mild: complicated acute illness or injury  UTI (urinary tract infection) with pyuria: complicated acute illness or injury  Weakness generalized: complicated acute illness or injury    Amount and/or Complexity of Data Reviewed  External Data Reviewed: labs and notes.  Labs: ordered. Decision-making details documented in ED Course.  Radiology: ordered and independent interpretation performed. Decision-making details documented in ED Course.    Risk  Prescription drug management.  Decision regarding hospitalization.        Final diagnoses:   Renal insufficiency, mild   Weakness generalized   UTI (urinary tract infection) with pyuria       ED Disposition  ED Disposition       ED Disposition   Decision to Admit    Condition   --    Comment   Level of Care: Telemetry [5]   Admitting Physician: LLANES ALVAREZ, CARLOS [[id number]]   Attending Physician: LLANES ALVAREZ, CARLOS [[id number]]                  No follow-up provider specified.       Medication List      No changes were made to your prescriptions during this visit.            Belkis Richardson, GINA  02/16/25 2223       Belkis Richardson, GINA  02/16/25 4915

## 2025-02-17 NOTE — ED NOTES
Around 1600, family was talking to pt, and pt was confused. LKW was 1100 this morning. Pt is alert and oriented to self and place.

## 2025-02-17 NOTE — PLAN OF CARE
Goal Outcome Evaluation:  Plan of Care Reviewed With: patient           Outcome Evaluation: Radha Barbour is a 74 y.o. female with PMH of HTN, HLD, CAD, DM type II, dementia, hypothyroidism presented to  ED 2/16/2025 with family who stated the patient had an episode of altered mental status. Pt (+) for UTI  and with syncopal episodes. Pt lives at home with sister and MIREILLE and is typically ind/mod I with mobility and ADLs. Pt currently presents with generalized weakness and demonstrates mild posterior lean with all mobility. Pt asympomatic for orthostatic hypotension today. Pt would benefit from skilled PT to increase functional mobility. Would recommend SNF based on initial eval, but pt has potential to progress to home with assist.    Anticipated Discharge Disposition (PT): skilled nursing facility (May progress to home with assist)

## 2025-02-18 LAB
ANION GAP SERPL CALCULATED.3IONS-SCNC: 10 MMOL/L (ref 5–15)
BACTERIA SPEC AEROBE CULT: ABNORMAL
BASOPHILS # BLD AUTO: 0.11 10*3/MM3 (ref 0–0.2)
BASOPHILS NFR BLD AUTO: 1.7 % (ref 0–1.5)
BUN SERPL-MCNC: 23 MG/DL (ref 8–23)
BUN/CREAT SERPL: 19.5 (ref 7–25)
CALCIUM SPEC-SCNC: 8.2 MG/DL (ref 8.6–10.5)
CHLORIDE SERPL-SCNC: 112 MMOL/L (ref 98–107)
CO2 SERPL-SCNC: 23 MMOL/L (ref 22–29)
CREAT SERPL-MCNC: 1.18 MG/DL (ref 0.57–1)
DEPRECATED RDW RBC AUTO: 51.5 FL (ref 37–54)
EGFRCR SERPLBLD CKD-EPI 2021: 48.3 ML/MIN/1.73
EOSINOPHIL # BLD AUTO: 0.3 10*3/MM3 (ref 0–0.4)
EOSINOPHIL NFR BLD AUTO: 4.6 % (ref 0.3–6.2)
ERYTHROCYTE [DISTWIDTH] IN BLOOD BY AUTOMATED COUNT: 14.4 % (ref 12.3–15.4)
GLUCOSE BLDC GLUCOMTR-MCNC: 106 MG/DL (ref 70–105)
GLUCOSE BLDC GLUCOMTR-MCNC: 168 MG/DL (ref 70–105)
GLUCOSE BLDC GLUCOMTR-MCNC: 171 MG/DL (ref 70–105)
GLUCOSE BLDC GLUCOMTR-MCNC: 189 MG/DL (ref 70–105)
GLUCOSE SERPL-MCNC: 138 MG/DL (ref 65–99)
HCT VFR BLD AUTO: 33.5 % (ref 34–46.6)
HGB BLD-MCNC: 9.9 G/DL (ref 12–15.9)
IMM GRANULOCYTES # BLD AUTO: 0.09 10*3/MM3 (ref 0–0.05)
IMM GRANULOCYTES NFR BLD AUTO: 1.4 % (ref 0–0.5)
LYMPHOCYTES # BLD AUTO: 1.32 10*3/MM3 (ref 0.7–3.1)
LYMPHOCYTES NFR BLD AUTO: 20.4 % (ref 19.6–45.3)
MAGNESIUM SERPL-MCNC: 1.6 MG/DL (ref 1.6–2.4)
MCH RBC QN AUTO: 28.9 PG (ref 26.6–33)
MCHC RBC AUTO-ENTMCNC: 29.6 G/DL (ref 31.5–35.7)
MCV RBC AUTO: 97.7 FL (ref 79–97)
MONOCYTES # BLD AUTO: 0.54 10*3/MM3 (ref 0.1–0.9)
MONOCYTES NFR BLD AUTO: 8.4 % (ref 5–12)
NEUTROPHILS NFR BLD AUTO: 4.1 10*3/MM3 (ref 1.7–7)
NEUTROPHILS NFR BLD AUTO: 63.5 % (ref 42.7–76)
NRBC BLD AUTO-RTO: 0 /100 WBC (ref 0–0.2)
PHOSPHATE SERPL-MCNC: 3.2 MG/DL (ref 2.5–4.5)
PHOSPHATE SERPL-MCNC: NORMAL MG/DL
PLATELET # BLD AUTO: 246 10*3/MM3 (ref 140–450)
PMV BLD AUTO: 9.4 FL (ref 6–12)
POTASSIUM SERPL-SCNC: 3.5 MMOL/L (ref 3.5–5.2)
RBC # BLD AUTO: 3.43 10*6/MM3 (ref 3.77–5.28)
SODIUM SERPL-SCNC: 145 MMOL/L (ref 136–145)
WBC NRBC COR # BLD AUTO: 6.46 10*3/MM3 (ref 3.4–10.8)

## 2025-02-18 PROCEDURE — 83735 ASSAY OF MAGNESIUM: CPT | Performed by: STUDENT IN AN ORGANIZED HEALTH CARE EDUCATION/TRAINING PROGRAM

## 2025-02-18 PROCEDURE — 36415 COLL VENOUS BLD VENIPUNCTURE: CPT | Performed by: NURSE PRACTITIONER

## 2025-02-18 PROCEDURE — 85025 COMPLETE CBC W/AUTO DIFF WBC: CPT | Performed by: NURSE PRACTITIONER

## 2025-02-18 PROCEDURE — 82948 REAGENT STRIP/BLOOD GLUCOSE: CPT

## 2025-02-18 PROCEDURE — 25010000002 CEFTRIAXONE PER 250 MG

## 2025-02-18 PROCEDURE — 80048 BASIC METABOLIC PNL TOTAL CA: CPT | Performed by: NURSE PRACTITIONER

## 2025-02-18 PROCEDURE — 82948 REAGENT STRIP/BLOOD GLUCOSE: CPT | Performed by: NURSE PRACTITIONER

## 2025-02-18 PROCEDURE — 25010000002 ENOXAPARIN PER 10 MG

## 2025-02-18 PROCEDURE — 63710000001 INSULIN LISPRO (HUMAN) PER 5 UNITS: Performed by: NURSE PRACTITIONER

## 2025-02-18 PROCEDURE — 84100 ASSAY OF PHOSPHORUS: CPT | Performed by: STUDENT IN AN ORGANIZED HEALTH CARE EDUCATION/TRAINING PROGRAM

## 2025-02-18 RX ORDER — ENOXAPARIN SODIUM 100 MG/ML
40 INJECTION SUBCUTANEOUS EVERY 24 HOURS
Status: DISCONTINUED | OUTPATIENT
Start: 2025-02-18 | End: 2025-02-20 | Stop reason: HOSPADM

## 2025-02-18 RX ORDER — AMLODIPINE BESYLATE 5 MG/1
5 TABLET ORAL
Status: DISCONTINUED | OUTPATIENT
Start: 2025-02-18 | End: 2025-02-19

## 2025-02-18 RX ORDER — MEMANTINE HYDROCHLORIDE 10 MG/1
10 TABLET ORAL 2 TIMES DAILY
Status: DISCONTINUED | OUTPATIENT
Start: 2025-02-18 | End: 2025-02-20 | Stop reason: HOSPADM

## 2025-02-18 RX ADMIN — CEFTRIAXONE 2000 MG: 2 INJECTION, POWDER, FOR SOLUTION INTRAMUSCULAR; INTRAVENOUS at 20:58

## 2025-02-18 RX ADMIN — Medication 1000 MCG: at 08:53

## 2025-02-18 RX ADMIN — GABAPENTIN 100 MG: 100 CAPSULE ORAL at 08:52

## 2025-02-18 RX ADMIN — INSULIN LISPRO 2 UNITS: 100 INJECTION, SOLUTION INTRAVENOUS; SUBCUTANEOUS at 11:49

## 2025-02-18 RX ADMIN — GABAPENTIN 100 MG: 100 CAPSULE ORAL at 17:13

## 2025-02-18 RX ADMIN — Medication 1000 UNITS: at 08:52

## 2025-02-18 RX ADMIN — ENOXAPARIN SODIUM 40 MG: 100 INJECTION SUBCUTANEOUS at 17:13

## 2025-02-18 RX ADMIN — CLOPIDOGREL BISULFATE 75 MG: 75 TABLET ORAL at 08:53

## 2025-02-18 RX ADMIN — LEVOTHYROXINE SODIUM 100 MCG: 100 TABLET ORAL at 04:40

## 2025-02-18 RX ADMIN — GABAPENTIN 100 MG: 100 CAPSULE ORAL at 20:58

## 2025-02-18 RX ADMIN — MEMANTINE HYDROCHLORIDE 5 MG: 10 TABLET, FILM COATED ORAL at 08:53

## 2025-02-18 RX ADMIN — Medication 10 ML: at 08:53

## 2025-02-18 RX ADMIN — ASPIRIN 81 MG: 81 TABLET, COATED ORAL at 08:52

## 2025-02-18 RX ADMIN — INSULIN LISPRO 2 UNITS: 100 INJECTION, SOLUTION INTRAVENOUS; SUBCUTANEOUS at 20:58

## 2025-02-18 RX ADMIN — BUPROPION HYDROCHLORIDE 100 MG: 100 TABLET, FILM COATED ORAL at 08:53

## 2025-02-18 RX ADMIN — DONEPEZIL HYDROCHLORIDE 10 MG: 5 TABLET, FILM COATED ORAL at 20:58

## 2025-02-18 RX ADMIN — AMLODIPINE BESYLATE 5 MG: 5 TABLET ORAL at 11:50

## 2025-02-18 RX ADMIN — ATORVASTATIN CALCIUM 10 MG: 10 TABLET ORAL at 08:53

## 2025-02-18 RX ADMIN — Medication 5 MG: at 21:06

## 2025-02-18 RX ADMIN — MEMANTINE HYDROCHLORIDE 10 MG: 10 TABLET, FILM COATED ORAL at 20:58

## 2025-02-18 RX ADMIN — BUPROPION HYDROCHLORIDE 100 MG: 100 TABLET, FILM COATED ORAL at 20:58

## 2025-02-18 RX ADMIN — Medication 10 ML: at 20:59

## 2025-02-18 RX ADMIN — PANTOPRAZOLE SODIUM 40 MG: 40 TABLET, DELAYED RELEASE ORAL at 04:40

## 2025-02-18 RX ADMIN — VENLAFAXINE HYDROCHLORIDE 150 MG: 75 CAPSULE, EXTENDED RELEASE ORAL at 08:52

## 2025-02-18 RX ADMIN — INSULIN LISPRO 2 UNITS: 100 INJECTION, SOLUTION INTRAVENOUS; SUBCUTANEOUS at 17:13

## 2025-02-18 NOTE — CASE MANAGEMENT/SOCIAL WORK
Continued Stay Note  [facility]     Patient Name: Radha Barbour  MRN: [id number]  Today's Date: 2/18/2025    Admit Date: 2/16/2025    Plan: Home w/ family. SNF choices pending.   Discharge Plan       Row Name 02/18/25 105       Plan    Plan Comments Update: Daughter called CM back and let it be known that they would like her to go back to [facility] since she was just discharged from the facility last week. CM sent referral to kyle Barlow Kathryn, pending reponse.      Row Name 02/18/25 1043       Plan    Plan Home w/ family. SNF choices pending.    Patient/Family in Agreement with Plan yes    Plan Comments CM went to the bedside and spoke with the patient about SNF choices. Per the patient she is agreeable but does not want to pick the facility at this time and would like CM to call her daughter, Mal and have her make the choice. CM attempted to call Mal and left a message for a call back.                   Phone communication or documentation only - no physical contact with patient or family.     Amber Monterroso RN

## 2025-02-18 NOTE — DISCHARGE PLACEMENT REQUEST
"Gm Deras (74 y.o. Female)       Date of Birth   1950    Social Security Number       Address   [address] [place] [address]    Home Phone   [phone]    MRN   [id number]       Jewish   Patient Refused    Marital Status                               Admission Date   25    Admission Type   Emergency    Admitting Provider   Mira Lopez MD    Attending Provider   Radha Brown MD    Department, Room/Bed   [facility] INPATIENT, 375/       Discharge Date       Discharge Disposition       Discharge Destination                                 Attending Provider: Radha Brown MD    Allergies: No Known Allergies    Isolation: Droplet   Infection: Rhinovirus  (25)   Code Status: CPR    Ht: 165.1 cm (65\")   Wt: 75.8 kg (167 lb 1.7 oz)    Admission Cmt: None   Principal Problem: UTI (urinary tract infection) [N39.0]                   Active Insurance as of 2025       Primary Coverage       Payor Plan Insurance Group Employer/Plan Group    ANTHEM MEDICARE REPLACEMENT ANTHEM MED ADV HMO [id number]       Payor Plan Address Payor Plan Phone Number Payor Plan Fax Number Effective Dates    [address] [phone]  2025 - None Entered    [facility] [id number]         Subscriber Name Subscriber Birth Date Member ID       GM DERAS 1950 [id number]                     Emergency Contacts        (Rel.) Home Phone Work Phone Mobile Phone    KARO JEFF (Daughter) -- -- [phone]                 History & Physical        Elsie Guzmán APRN at 25 225       Attestation signed by Hari Oconnor MD at 25 0232    I have reviewed this documentation and agree.                      [facility] Medicine Services    Hospitalist History and Physical     Gm Deras : 1950 MRN:[id number] LOS:0 ROOM:      Reason for admission: UTI (urinary tract " infection)     Assessment / Plan     Acute kidney injury  Recurrent UTI  Borderline hypotension   - BUN 24/creatinine 2.35 (compared to BUN 17 and creatinine 0.93 on 11/2024)  - UA: large blood, large leukocytes, too numerous RBCs, too numerous WBCs, 4+ bacteria, 3-6 squamous epithelial cells  - WBC 11.1, HR normal, BP borderline low. Does not appear septic  - pt has been on macrobid, hold   - IVFs, IV rocephin  - follow urine culture  - hold home BP meds    Transient episode of confusion  Generalized weakness  - CT head: atrophy and chronic microvascular ischemic change.  No acute intracranial process  - suspect patient was orthostatic as her BP is low sitting in the bed and the episode occurred when going from the bed to a chair.   - pt is currently alert, oriented x3, moves all extremities equally   - EKG: Sinus or ectopic atrial rhythm rate 73. LVH with secondary repolarization abnormality   - check orthostatics  - neuro checks qshift  - PT/OT eval, case management as patient needs placement     Rhinovirus  - normal oxygen saturation on room air  - CXR: no active disease  - duonebs prn, continuous pulse oximetry     Hypertension  - hold home BP meds due to borderline low BP and MELODY    Hyperlipidemia  - cont home statin when home meds verified    CAD  - cont home plavix, statin    DM type II  - hold home metformin  - add SSI    Hypothyroidism  - cont home synthroid    Dementia  - cont home aricept, namenda, atarax     Nutrition:   Diet: Diabetic; Consistent Carbohydrate; Fluid Consistency: Thin (IDDSI 0)     VTE Prophylaxis:  Mechanical VTE prophylaxis orders are present.         History of Present illness     Radha Barbour is a 74 y.o. female with PMH of HTN, HLD, CAD, DM type II, dementia, hypothyroidism presented to  ED 2/16/2025 with family who stated the patient had an episode of altered mental status this afternoon. She had an incontinent episode of stool this afternoon and after cleaning her up the  daughter went to sit her in the chair and the patient had an episode where she was poorly responsive. EMS was called and she had a low BP per the daughter. She was just discharged from rehab two days ago and the family felt she was discharged too quickly but she did not have medicaid only medicare so she was discharged home. She lives with her sister. They feel she needs further rehab. She has had a mild cough and some shortness of breath. She has been fighting a UTI.     In the ED CBC showed WBC 11.1, CMP showed BUN 24/creatinine 2.35 (compared to BUN 17 and creatinine 0.93 on 11/2024).  Urinalysis showed large blood, large leukocytes, too numerous RBCs, too numerous WBCs, 4+ bacteria, 3-6 squamous epithelial cells.  CT head showed atrophy and chronic microvascular ischemic change.  No acute intracranial process.  Chest x-ray showed no active disease. Vitals signs normal on room air.  Blood pressure was a little low at 98/80. RVP detected rhinovirus. She was started on IVFs and IV rocephin. She was admitted for further treatment of UTI, MELODY, weakness.     Subjective / Review of systems     Review of Systems   HENT: Negative.     Eyes: Negative.    Respiratory: Negative.     Cardiovascular: Negative.    Gastrointestinal: Negative.    Genitourinary: Negative.    Musculoskeletal: Negative.    Skin: Negative.    Neurological:  Positive for weakness.   Psychiatric/Behavioral: Negative.          Past Medical/Surgical/Social/Family History & Allergies     Past Medical History:   Diagnosis Date    Anxiety     Arthritis 1998    B12 deficiency 07/08/2016    Last Assessment & Plan:  Formatting of this note might be different from the original.  Compliant and controlled with current medication B 12 supplements . Lab today B 12    Back pain, chronic 07/08/2016    Last Assessment & Plan:  Formatting of this note might be different from the original. Will refer to PT    Cancer 2009    Colon    Colon polyp 2005    Colon Cancer     Coronary artery disease involving native heart without angina pectoris 2022    Dementia     Doctor prescribed medicine for dementia/memory loss    Depression 2016    Last Assessment & Plan:  Formatting of this note might be different from the original.  Compliant and controlled with current medication    Diabetes mellitus 2022    Diabetic peripheral neuropathy 2020    Last Assessment & Plan:  Formatting of this note might be different from the original. Stable    Difficulty walking     Extreme unsteadiness on feet    DM type 2, goal HbA1c < 7% 2017    Last Assessment & Plan:  Formatting of this note might be different from the original.  Compliant and controlled with current diet.llabs today A1c    Dyslipidemia 10/11/2017    Last Assessment & Plan:  Formatting of this note might be different from the original.  Compliant and controlled with current medication/statin therapy/labs today    Environmental and seasonal allergies 10/11/2019    Gastric reflux 2017    HTN, goal below 130/80 02/10/2016    Last Assessment & Plan:  Formatting of this note might be different from the original. Compliant and controlled with current medication ramipril /labs today cmp to check renal function and electrolytes    Hyperlipidemia     Hypothyroidism     Last Assessment & Plan:  Formatting of this note might be different from the original. TSH low reduce levothyroxine.  Will check TSH today.    Insomnia 2015    Lumbosacral radiculopathy 2020    Last Assessment & Plan:  Formatting of this note might be different from the original.  Compliant and controlled with current medication Muscle relaxer    Memory loss     Osteoporosis 2020    Formatting of this note is different from the original. RADIOLOGY REPORT   FACILITY:  [facility] UNIT/AGE/GENDER: [id number]  OP      AGE:69 Y          SEX:F PATIENT NAME/:  GM DERAS    1950 UNIT NUMBER:   [id number] ACCOUNT NUMBER:  [id number] ACCESSION NUMBER:  [id number]     EXAMINATION: Bone densitometry of multiple sites, lumbar spine, left hip and distal    Restless leg syndrome 2018    Last Assessment & Plan:  Formatting of this note might be different from the original.  Compliant and controlled with current medication requip    Shortness of breath     TIA (transient ischemic attack) 3/28/2024      Past Surgical History:   Procedure Laterality Date    BACK SURGERY  2007    lower    CARDIAC CATHETERIZATION Right 04/15/2022    Procedure: Left Heart Cath;  Surgeon: Laila Alvarez MD;  Location: [facility] CATH INVASIVE LOCATION;  Service: Cardiovascular;  Laterality: Right;    CARDIAC CATHETERIZATION  4/15/2022    COLON SURGERY      REPLACEMENT TOTAL KNEE Left     redone in     REPLACEMENT TOTAL KNEE Right       Social History     Socioeconomic History    Marital status:    Tobacco Use    Smoking status: Former     Current packs/day: 0.00     Average packs/day: 2.0 packs/day for 20.0 years (40.0 ttl pk-yrs)     Types: Cigarettes     Start date: 1978     Quit date: 1998     Years since quittin.1    Smokeless tobacco: Never   Vaping Use    Vaping status: Never Used   Substance and Sexual Activity    Alcohol use: Yes     Comment: socially    Drug use: Never     Comment: CBD oils    Sexual activity: Not Currently     Partners: Male     Birth control/protection: Post-menopausal, None      Family History   Problem Relation Age of Onset    Hypertension Mother     Arthritis Mother     Colon cancer Mother     Thyroid disease Mother     Stroke Mother     Cancer Mother         Colon    Dementia Mother     Heart disease Sister     Heart disease Sister     Dementia Maternal Aunt     Dementia Maternal Aunt       No Known Allergies   Social Drivers of Health     Tobacco Use: Medium Risk (2025)    Patient History     Smoking Tobacco Use: Former     Smokeless Tobacco Use:  Never     Passive Exposure: Not on file   Alcohol Use: Not on file   Financial Resource Strain: Not on file   Food Insecurity: Not on file   Transportation Needs: Not on file   Physical Activity: Not on file   Stress: Not on file   Social Connections: Not on file   Interpersonal Safety: Not At Risk (2/16/2025)    Abuse Screen     Unsafe at Home or Work/School: no     Feels Threatened by Someone?: no     Does Anyone Keep You from Contacting Others or Doint Things Outside the Home?: no     Physical Sign of Abuse Present: no   Depression: Not at risk (1/9/2025)    PHQ-2     PHQ-2 Score: 0   Housing Stability: Not on file   Utilities: Not on file   Health Literacy: Not on file   Employment: Not on file   Disabilities: Not on file        Home Medications     Prior to Admission medications    Medication Sig Start Date End Date Taking? Authorizing Provider   albuterol sulfate  (90 Base) MCG/ACT inhaler Inhale 2 puffs Every 4 (Four) Hours As Needed for Wheezing or Shortness of Air. 7/5/24   Michell Ramirez APRN   amLODIPine (NORVASC) 5 MG tablet Take 1 tablet by mouth Daily. 9/21/23   Laila Alvarez MD   aspirin 81 MG EC tablet Take 1 tablet by mouth Daily.    ProviderFlorin MD   budesonide-formoterol (SYMBICORT) 160-4.5 MCG/ACT inhaler INHALE 2 PUFFS BY MOUTH TWICE A DAY 10/16/24   Michell Ramirez APRN   buPROPion (WELLBUTRIN) 100 MG tablet TAKE 1 TABLET BY MOUTH 2 TIMES A DAY 7/16/24   Michell Ramirez APRN   CBD (cannabidiol) oral oil Take 1 drop by mouth Daily As Needed.    ProviderFlorin MD   cloNIDine (CATAPRES) 0.2 MG tablet TAKE 1 TABLET BY MOUTH DAILY 7/11/24   Michell Ramirez APRN   clopidogrel (PLAVIX) 75 MG tablet Take 1 tablet by mouth Daily. 2/4/25   Michell Ramirez APRN   donepezil (ARICEPT) 10 MG tablet Take 1 tablet by mouth Every Night. 4/22/24   Seipel, Joseph F, MD   erythromycin (ROMYCIN) 5 MG/GM ophthalmic ointment  4/9/24   ProviderFlorin MD    gabapentin (NEURONTIN) 300 MG capsule TAKE 1 CAPSULE BY MOUTH 3 TIMES A DAY 7/11/24   Michell Ramirez APRN   hydrOXYzine (ATARAX) 10 MG tablet TAKE 1 TABLET BY MOUTH DAILY 10/31/24   Michell Ramirez APRN   levothyroxine (SYNTHROID, LEVOTHROID) 125 MCG tablet Take 1 tablet by mouth Daily. 2/26/24   Florin Parker MD   meclizine (ANTIVERT) 25 MG tablet Take 1 tablet by mouth 3 (Three) Times a Day As Needed for Dizziness. 11/7/24   Michell Ramirez APRN   memantine (NAMENDA) 10 MG tablet TAKE 1 TABLET BY MOUTH 2 TIMES A DAY 10/31/24   Michell Ramirez APRN   metFORMIN (GLUCOPHAGE) 1000 MG tablet TAKE 1 TABLET BY MOUTH DAILY WITH BREAKFAST 7/16/24   Michell Ramirez APRN   nitrofurantoin, macrocrystal-monohydrate, (Macrobid) 100 MG capsule Take 1 capsule by mouth 2 (Two) Times a Day. 1/12/25   Miriam Tripathi APRN   omeprazole (priLOSEC) 40 MG capsule TAKE 1 CAPSULE BY MOUTH DAILY 7/11/24   Michell Ramirez APRN   pravastatin (PRAVACHOL) 20 MG tablet TAKE 1 TABLET BY MOUTH DAILY 10/31/24   Michell Ramirez APRN   ramipril (ALTACE) 2.5 MG capsule TAKE 2 CAPSULES BY MOUTH DAILY 10/31/24   Michell Ramirez APRN   rOPINIRole (REQUIP) 0.5 MG tablet TAKE 1 TABLET BY MOUTH EVERY NIGHT AT BEDTIME 1 HOUR BEFORE BEDTIME 7/11/24   Michell Ramirez APRN   traZODone (DESYREL) 50 MG tablet Take 1 tablet by mouth At Night As Needed for Sleep. 6/16/23   Michell Ramirez APRN   venlafaxine XR (EFFEXOR-XR) 150 MG 24 hr capsule Take 1 capsule by mouth Daily. 7/16/24   Michell Ramirez APRN   vitamin B-12 (CYANOCOBALAMIN) 1000 MCG tablet Take 1 tablet by mouth Daily.    Florin Parker MD   Vitamin D, Cholecalciferol, 50 MCG (2000 UT) capsule Take 1 capsule by mouth Daily.    Provider, Historical, MD        Objective / Physical Exam     Vital signs:  Temp: 98.8 °F (37.1 °C)  BP: 129/61  Heart Rate: 79  Resp: 16  SpO2: 98 %  Weight: 81.6 kg (180 lb)    Admission Weight: Weight:  81.6 kg (180 lb)    Physical Exam  Vitals and nursing note reviewed.   HENT:      Head: Normocephalic and atraumatic.   Eyes:      Extraocular Movements: Extraocular movements intact.      Pupils: Pupils are equal, round, and reactive to light.   Cardiovascular:      Rate and Rhythm: Normal rate and regular rhythm.      Pulses: Normal pulses.      Heart sounds: Normal heart sounds.   Pulmonary:      Effort: Pulmonary effort is normal.      Breath sounds: Normal breath sounds.   Abdominal:      General: Bowel sounds are normal.      Palpations: Abdomen is soft.      Tenderness: There is no abdominal tenderness.   Musculoskeletal:         General: Normal range of motion.   Skin:     General: Skin is warm and dry.   Neurological:      Mental Status: She is alert and oriented to person, place, and time.   Psychiatric:         Mood and Affect: Mood normal.         Behavior: Behavior normal.          Labs     Results from last 7 days   Lab Units 02/16/25 2032   WBC 10*3/mm3 11.15*   HEMOGLOBIN g/dL 14.5   HEMATOCRIT % 46.5   PLATELETS 10*3/mm3 423      Results from last 7 days   Lab Units 02/16/25 2032   ALK PHOS U/L 87   AST (SGOT) U/L 26   ALT (SGPT) U/L 10           Results from last 7 days   Lab Units 02/16/25 2032   SODIUM mmol/L 140   POTASSIUM mmol/L 3.9   CHLORIDE mmol/L 99   CO2 mmol/L 24.7   BUN mg/dL 24*   CREATININE mg/dL 2.35*   GLUCOSE mg/dL 125*        Imaging     CT Head Without Contrast    Result Date: 2/16/2025  CT HEAD WO CONTRAST Date of Exam: 2/16/2025 9:35 PM EST Indication: altered mental status. Comparison: CT scan the head 1/26/2025 Technique: Axial CT images were obtained of the head without contrast administration.  Coronal reconstructions were performed.  Automated exposure control and iterative reconstruction methods were used. Findings: There is mild diffuse generalized atrophy. There are low-attenuation areas in the periventricular white matter consistent with chronic microvascular  ischemic change. There is no mass, mass effect or midline shift. There are no abnormal extra-axial fluid collections or areas of acute hemorrhage. There are are bilateral maxillary sinus air-fluid levels. There is bilateral ethmoid sinus disease. The right mastoid air cells are non pneumatized. The left are clear.     Impression: Atrophy and chronic microvascular ischemic change. No acute intracranial process. Electronically Signed: Byron Shi MD  2025 10:20 PM EST  Workstation ID: [id number]    XR Chest 1 View    Result Date: 2025  XR CHEST 1 VW Date of Exam: 2025 8:58 PM EST Indication: Altered mental status Comparison: Chest x-ray 2025 Findings: There are no airspace consolidations. No pleural fluid. No pneumothorax. The pulmonary vasculature appears within normal limits. The cardiac and mediastinal silhouette appear unremarkable. No acute osseous abnormality identified.     Impression: No active disease. Electronically Signed: Byron Shi MD  2025 9:23 PM EST  Workstation ID: [id number]        Current Medications     Scheduled Meds:  budesonide-formoterol, 2 puff, Inhalation, BID - RT  sodium chloride, 10 mL, Intravenous, Q12H         Continuous Infusions:  sodium chloride, 100 mL/hr, Last Rate: 100 mL/hr (25 2210)           [facility]  25   23:28 EST     Electronically signed by Hari Oconnor MD at 25 0233       Operative/Procedure Notes (all)    No notes of this type exist for this encounter.          Physician Progress Notes (last 48 hours)        Radha Brown MD at 25 1140              [facility] MEDICINE SERVICE  DAILY PROGRESS NOTE    NAME: Radha Barbour  : 1950  MRN: [id number]      LOS: 1 day     PROVIDER OF SERVICE: Radha Brown MD    Chief Complaint: UTI (urinary tract infection)    Subjective:     Interval History:  History taken from: patient    Patient seen and  examined at bedside this morning.  No acute complaints overnight.  States that she is doing better, came into the hospital because she was confused however states that it is more typically happens when she has UTI        Review of Systems: Negative except described above  Review of Systems    Objective:     Vital Signs  Temp:  [97.6 °F (36.4 °C)-98.4 °F (36.9 °C)] 97.9 °F (36.6 °C)  Heart Rate:  [73-93] 75  Resp:  [16-21] 20  BP: (112-179)/(56-87) 155/69   Body mass index is 27.51 kg/m².    Physical Exam  Physical Exam  Constitutional:       Comments: NAD    Cardiovascular:      Comments:  RRR, S1 & S2   Pulmonary:      Comments:  Lungs CTA   Abdominal:      Comments:  ABD soft, NT            Diagnostic Data    Results from last 7 days   Lab Units 02/18/25  0212 02/17/25  0116 02/16/25 2032   WBC 10*3/mm3 6.46   < > 11.15*   HEMOGLOBIN g/dL 9.9*   < > 14.5   HEMATOCRIT % 33.5*   < > 46.5   PLATELETS 10*3/mm3 246   < > 423   GLUCOSE mg/dL 138*   < > 125*   CREATININE mg/dL 1.18*   < > 2.35*   BUN mg/dL 23   < > 24*   SODIUM mmol/L 145   < > 140   POTASSIUM mmol/L 3.5   < > 3.9   AST (SGOT) U/L  --   --  26   ALT (SGPT) U/L  --   --  10   ALK PHOS U/L  --   --  87   BILIRUBIN mg/dL  --   --  0.3   ANION GAP mmol/L 10.0   < > 16.3*    < > = values in this interval not displayed.       US Renal Bilateral    Result Date: 2/17/2025  Impression: Benign left renal cyst otherwise unremarkable exam. Electronically Signed: Codie Garcia MD  2/17/2025 1:44 PM EST  Workstation ID: [id number]    CT Head Without Contrast    Result Date: 2/16/2025  Impression: Atrophy and chronic microvascular ischemic change. No acute intracranial process. Electronically Signed: Byron Shi MD  2/16/2025 10:20 PM EST  Workstation ID: [id number]    XR Chest 1 View    Result Date: 2/16/2025  Impression: No active disease. Electronically Signed: Byron Shi MD  2/16/2025 9:23 PM EST  Workstation ID: [id number]       I reviewed the patient's new  clinical results.    Assessment/Plan:     Active and Resolved Problems  Active Hospital Problems    Diagnosis  POA    **UTI (urinary tract infection) [N39.0]  Yes    MELODY (acute kidney injury) [N17.9]  Yes      Resolved Hospital Problems   No resolved problems to display.       UTI  -Continue IV ceftriaxone, she has a history of recurrent UTIs, urine culture positive for E. coli.  She does not meet SIRS/sepsis criteria.     MELODY  -Creatinine improved as compared to yesterday baseline creatinine around 0.9.  She was on continuous IV fluids overnight without improvement in renal function.  -Renal ultrasound unremarkable   -Hold ramipril  -Consult nephrology     Rhinovirus  -Supportive treatment     Generalized weakness  -Likely secondary to all the above  -PT/OT eval, currently recommending SNF versus home health     Transient episode of altered mental status likely secondary to UTI  -Confusion appears to be resolved at this point.  It appears she is on donepezil and memantine at home for history of cognitive impairment.  -Will renal dose gabapentin and hold hydroxyzine     Hypertension  -Holding ACE inhibitor due to MELODY.  Will start on amlodipine     CAD  -Continue statin, aspirin, Plavix     T2DM  -Hold metformin  -SSI ACHS  -CCD  -Hypoglycemia protocol in place     Hypothyroidism  -Continue levothyroxine    VTE Prophylaxis:  Pharmacologic & mechanical VTE prophylaxis orders are present.             Disposition Planning:        Anticipated Date of Discharge: 2/20/2025         Time: 35 minutes     Code Status and Medical Interventions: CPR (Attempt to Resuscitate); Full Support   Ordered at: 02/16/25 0986     Level Of Support Discussed With:    Patient     Code Status (Patient has no pulse and is not breathing):    CPR (Attempt to Resuscitate)     Medical Interventions (Patient has pulse or is breathing):    Full Support       Signature: Electronically signed by Radha Brown MD, 02/18/25, 11:40 EST.  [facility]ist [facility]      Electronically signed by Radha Brown MD at 25 1144       Fausto Guo MD at 25 1012              [facility] Progress Note      Patient Name: Radha Barbour  : 1950  MRN: [id number]  Primary Care Physician:  Michell Ramirez APRN  Date of admission: 2025    Subjective     Interval History:   No soa or cp    Review of Systems:   14 point review of systems is otherwise negative except for mentioned above on HPI    Objective     Vitals:   Temp:  [97.6 °F (36.4 °C)-98.4 °F (36.9 °C)] 97.9 °F (36.6 °C)  Heart Rate:  [73-93] 80  Resp:  [12-21] 20  BP: (112-179)/(56-87) 179/79    Intake/Output Summary (Last 24 hours) at 2025 1012  Last data filed at 2025 1000  Gross per 24 hour   Intake 960 ml   Output --   Net 960 ml       Physical Exam:    General Appearance: alert, oriented x 3, no acute distress   Skin: warm and dry  HEENT: oral mucosa normal, nonicteric sclera  Neck: supple, no JVD  Lungs: CTA  Heart: RRR, normal S1 and S2  Abdomen: soft, nontender, nondistended  : no palpable bladder  Extremities: no edema, cyanosis or clubbing  Neuro: normal speech and mental status     Scheduled Meds:     aspirin, 81 mg, Oral, Daily  atorvastatin, 10 mg, Oral, Daily  budesonide-formoterol, 2 puff, Inhalation, BID - RT  buPROPion, 100 mg, Oral, BID  cefTRIAXone, 1,000 mg, Intravenous, Q24H  cholecalciferol, 1,000 Units, Oral, Daily  clopidogrel, 75 mg, Oral, Daily  donepezil, 10 mg, Oral, Nightly  enoxaparin, 30 mg, Subcutaneous, Q24H  gabapentin, 100 mg, Oral, TID  [Held by provider] hydrOXYzine, 10 mg, Oral, Daily  insulin lispro, 2-7 Units, Subcutaneous, 4x Daily AC & at Bedtime  levothyroxine, 100 mcg, Oral, Q AM  memantine, 5 mg, Oral, BID  pantoprazole, 40 mg, Oral, Q AM  sodium chloride, 10 mL, Intravenous, Q12H  venlafaxine XR, 150 mg, Oral, Daily  vitamin B-12, 1,000 mcg, Oral, Daily      IV Meds:   Pharmacy  to Dose enoxaparin (LOVENOX),         Results Reviewed:   I have personally reviewed the results from the time of this admission to 2/18/2025 10:12 EST     Results from last 7 days   Lab Units 02/18/25 0212 02/17/25 0116 02/16/25 2032   SODIUM mmol/L 145 137 140   POTASSIUM mmol/L 3.5 3.1* 3.9   CHLORIDE mmol/L 112* 98 99   CO2 mmol/L 23.0 25.0 24.7   BUN mg/dL 23 25* 24*   CREATININE mg/dL 1.18* 2.37* 2.35*   CALCIUM mg/dL 8.2* 8.9 9.7   BILIRUBIN mg/dL  --   --  0.3   ALK PHOS U/L  --   --  87   ALT (SGPT) U/L  --   --  10   AST (SGOT) U/L  --   --  26   GLUCOSE mg/dL 138* 234* 125*     Estimated Creatinine Clearance: 41.7 mL/min (A) (by C-G formula based on SCr of 1.18 mg/dL (H)).  Results from last 7 days   Lab Units 02/18/25 0212   MAGNESIUM mg/dL 1.6   PHOSPHORUS mg/dL 3.2     Results from last 7 days   Lab Units 02/17/25 0116   URIC ACID mg/dL 7.7*     Results from last 7 days   Lab Units 02/18/25 0212 02/17/25 0116 02/16/25 2032   WBC 10*3/mm3 6.46 9.90 11.15*   HEMOGLOBIN g/dL 9.9* 12.4 14.5   PLATELETS 10*3/mm3 246 400 423           Assessment / Plan     ASSESSMENT:    Sid-prerenal, due to hypovolemia in the setting of her uti most likely, resolved with hydration  Hypovolemia-due to decreased po intake  Hypotension-due to ramipril and hypovolemia  Uti with sepsis-on empiric abx  Altered mental status     PLAN:  Heplock ivf  Avoid nephrotoxins  Encouraged po fluids  I'll sign off but remain available, thank you    Thank you for involving us in the care of Radha Babrour.  Please feel free to call with any questions.    Fausto Guo MD  02/18/25  10:12 EST    [facility]  [phone]       Electronically signed by Fausto Guo MD at 02/18/25 1013       Sienna Machado PA-C at 02/17/25 0729       Attestation signed by Mira Lopez MD at 02/17/25 5603    I have reviewed this documentation and agree.                      [facility]  SERVICE  DAILY PROGRESS NOTE    NAME: Radha Barbour  : 1950  MRN: [id number]      LOS: 0 days     PROVIDER OF SERVICE: Sienna Machado PA-C    Chief Complaint: UTI (urinary tract infection)    Subjective:     Interval History:    Patient seen and evaluated at bedside.   Patient feeling better than yesterday.  She denies any flank pain, fevers, dysuria.  Treatment plan discussed with patient. All questions addressed.     Review of Systems: Positive for generalized weakness, confusion, cough, shortness of breath  Denies fevers, chills  Denies chest pain, edema  Denies wheezing  Denies nausea, vomiting, diarrhea  Denies dysuria, hematuria    Objective:     Vital Signs  Temp:  [98.8 °F (37.1 °C)-99.1 °F (37.3 °C)] 98.8 °F (37.1 °C)  Heart Rate:  [] 79  Resp:  [12-21] 21  BP: (101-129)/(49-82) 116/52   Body mass index is 29.95 kg/m².    Physical Exam   General: No acute distress, alert and oriented  CV: RRR, no peripheral edema  Pulm: CTAB, no increased work of breathing  Abd: Soft, nontender, nondistended, no CVA tenderness  Skin: No rashes or lesions on exposed skin  Psych: Appropriate mood and affect    Scheduled Meds   aspirin, 81 mg, Oral, Daily  atorvastatin, 10 mg, Oral, Daily  budesonide-formoterol, 2 puff, Inhalation, BID - RT  buPROPion, 100 mg, Oral, BID  cefTRIAXone, 1,000 mg, Intravenous, Q24H  cholecalciferol, 1,000 Units, Oral, Daily  clopidogrel, 75 mg, Oral, Daily  donepezil, 10 mg, Oral, Nightly  enoxaparin, 30 mg, Subcutaneous, Q24H  gabapentin, 100 mg, Oral, TID  [Held by provider] hydrOXYzine, 10 mg, Oral, Daily  insulin lispro, 2-7 Units, Subcutaneous, 4x Daily AC & at Bedtime  levothyroxine, 100 mcg, Oral, Q AM  memantine, 5 mg, Oral, BID  pantoprazole, 40 mg, Oral, Q AM  sodium chloride, 10 mL, Intravenous, Q12H  venlafaxine XR, 150 mg, Oral, Daily  vitamin B-12, 1,000 mcg, Oral, Daily       PRN Meds     albuterol sulfate HFA    aluminum-magnesium hydroxide-simethicone     senna-docusate sodium **AND** polyethylene glycol **AND** bisacodyl **AND** bisacodyl    dextrose    dextrose    glucagon (human recombinant)    ipratropium-albuterol    meclizine    melatonin    ondansetron ODT **OR** ondansetron    Pharmacy to Dose enoxaparin (LOVENOX)    rOPINIRole    [COMPLETED] Insert Peripheral IV **AND** sodium chloride    sodium chloride    sodium chloride   Infusions  Pharmacy to Dose enoxaparin (LOVENOX),   sodium chloride, 100 mL/hr, Last Rate: 100 mL/hr (02/16/25 2210)          Diagnostic Data    Results from last 7 days   Lab Units 02/17/25  0116 02/16/25 2032   WBC 10*3/mm3 9.90 11.15*   HEMOGLOBIN g/dL 12.4 14.5   HEMATOCRIT % 39.6 46.5   PLATELETS 10*3/mm3 400 423   GLUCOSE mg/dL 234* 125*   CREATININE mg/dL 2.37* 2.35*   BUN mg/dL 25* 24*   SODIUM mmol/L 137 140   POTASSIUM mmol/L 3.1* 3.9   AST (SGOT) U/L  --  26   ALT (SGPT) U/L  --  10   ALK PHOS U/L  --  87   BILIRUBIN mg/dL  --  0.3   ANION GAP mmol/L 14.0 16.3*       CT Head Without Contrast    Result Date: 2/16/2025  Impression: Atrophy and chronic microvascular ischemic change. No acute intracranial process. Electronically Signed: Byron Shi MD  2/16/2025 10:20 PM EST  Workstation ID: [id number]    XR Chest 1 View    Result Date: 2/16/2025  Impression: No active disease. Electronically Signed: Byron Shi MD  2/16/2025 9:23 PM EST  Workstation ID: [id number]     Interval results reviewed.    Assessment/Plan:     UTI  -Continue IV ceftriaxone, she has a history of recurrent UTIs, will await urine culture.  She does not meet SIRS/sepsis criteria.    MELODY  -Renal function is about the same today; creatinine 2.37, BUN 25.  Baseline creatinine around 0.9.  She was on continuous IV fluids overnight without improvement in renal function.  -Obtain renal ultrasound, urine studies, uric acid, CK.  -Hold ramipril  -Consult nephrology    Rhinovirus  -Supportive treatment    Generalized weakness  -Likely secondary to all the above  -PT/OT  eval pending    Transient episode of altered mental status  -Confusion appears to be resolved at this point.  It appears she is on donepezil and memantine at home for history of cognitive impairment.  -Will renal dose gabapentin and hold hydroxyzine    Hypertension  -Antihypertensives held due to borderline low blood pressures on admission.  She is normotensive at this time, will continue to hold antihypertensives for the time being.    CAD  -Continue statin, aspirin, Plavix    T2DM  -Hold metformin  -SSI ACHS  -CCD  -Hypoglycemia protocol in place    Hypothyroidism  -Continue levothyroxine      Treatment plan discussed with nursing staff, case management and pharmacy.     VTE Prophylaxis:  Pharmacologic & mechanical VTE prophylaxis orders are present.         Code status is   Code Status and Medical Interventions: CPR (Attempt to Resuscitate); Full Support   Ordered at: 25 8536     Level Of Support Discussed With:    Patient     Code Status (Patient has no pulse and is not breathing):    CPR (Attempt to Resuscitate)     Medical Interventions (Patient has pulse or is breathing):    Full Support       Plan for disposition: Pending PT/OT evaluation and recommendations.    Barriers to discharge: Ongoing treatment of UTI, awaiting urine culture.  Awaiting improvement in renal function    Time: 35+ minutes     Signature: Electronically signed by Sienna Machado PA-C, 25, 13:04 EST.  [facility] Hospitalist Team      Electronically signed by Mira Lopez MD at 25 1358          Consult Notes (last 48 hours)        Fausto Guo MD at 25 1701            [facility] Consult Note      Patient Name: Radha Barbour  : 1950  MRN: [id number]  Primary Care Physician:  Michell Ramirez APRN  Referring Physician: GINA Minaya  Date of admission: 2025    Subjective     Reason for Consult:  yonas    HPI:   Radha Barbour is a 74 y.o. female  admitted with confusion, transient hypotension, and simultaneous uti and rhinovirus infections. She was found to have a creatinine level of 2 where it was 0.9 on 11/24 prompting renal consultation. She seems awake and alert today. She has been treated for her uti and given ivf. She was initially hypotensive but her ramipril was stopped and it quickly resolved. She has no dysuria n/v or flank pain. She does not smoke or take nsaids.    Review of Systems:   14 point review of systems is otherwise negative except for mentioned above on HPI    Personal History     Past Medical History:   Diagnosis Date    Anxiety     Arthritis 1998    B12 deficiency 07/08/2016    Last Assessment & Plan:  Formatting of this note might be different from the original.  Compliant and controlled with current medication B 12 supplements . Lab today B 12    Back pain, chronic 07/08/2016    Last Assessment & Plan:  Formatting of this note might be different from the original. Will refer to PT    Cancer 2009    Colon    Colon polyp 2005    Colon Cancer    Coronary artery disease involving native heart without angina pectoris 06/02/2022    Dementia 2021    Doctor prescribed medicine for dementia/memory loss    Depression 07/08/2016    Last Assessment & Plan:  Formatting of this note might be different from the original.  Compliant and controlled with current medication    Diabetes mellitus 06/02/2022    Diabetic peripheral neuropathy 02/14/2020    Last Assessment & Plan:  Formatting of this note might be different from the original. Stable    Difficulty walking 2022    Extreme unsteadiness on feet    DM type 2, goal HbA1c < 7% 03/27/2017    Last Assessment & Plan:  Formatting of this note might be different from the original.  Compliant and controlled with current diet.llabs today A1c    Dyslipidemia 10/11/2017    Last Assessment & Plan:  Formatting of this note might be different from the original.  Compliant and controlled with current  medication/statin therapy/labs today    Environmental and seasonal allergies 10/11/2019    Gastric reflux 2017    HTN, goal below 130/80 02/10/2016    Last Assessment & Plan:  Formatting of this note might be different from the original. Compliant and controlled with current medication ramipril /labs today cmp to check renal function and electrolytes    Hyperlipidemia     Hypothyroidism     Last Assessment & Plan:  Formatting of this note might be different from the original. TSH low reduce levothyroxine.  Will check TSH today.    Insomnia 2015    Lumbosacral radiculopathy 2020    Last Assessment & Plan:  Formatting of this note might be different from the original.  Compliant and controlled with current medication Muscle relaxer    Memory loss     Osteoporosis 2020    Formatting of this note is different from the original. RADIOLOGY REPORT   FACILITY:  [facility]/AGE/GENDER: MARTÍNCMACLRK  OP      AGE:69 Y          SEX:F PATIENT NAME/:  GM DERAS    1950 UNIT NUMBER:  [id number] ACCOUNT NUMBER:  [id number] ACCESSION NUMBER:  [id number]     EXAMINATION: Bone densitometry of multiple sites, lumbar spine, left hip and distal    Restless leg syndrome 2018    Last Assessment & Plan:  Formatting of this note might be different from the original.  Compliant and controlled with current medication requip    Shortness of breath     TIA (transient ischemic attack) 3/28/2024       Past Surgical History:   Procedure Laterality Date    BACK SURGERY  2007    lower    CARDIAC CATHETERIZATION Right 04/15/2022    Procedure: Left Heart Cath;  Surgeon: Laila Alvarez MD;  Location: [facility] CATH INVASIVE LOC[facility];  Service: Cardiovascular;  Laterality: Right;    CARDIAC CATHETERIZATION  4/15/2022    COLON SURGERY  2006    REPLACEMENT TOTAL KNEE Left     redone in     REPLACEMENT TOTAL KNEE Right        Family History: family history  includes Arthritis in her mother; Cancer in her mother; Colon cancer in her mother; Dementia in her maternal aunt, maternal aunt, and mother; Heart disease in her sister and sister; Hypertension in her mother; Stroke in her mother; Thyroid disease in her mother.    Social History:  reports that she quit smoking about 27 years ago. Her smoking use included cigarettes. She started smoking about 47 years ago. She has a 40 pack-year smoking history. She has never used smokeless tobacco. She reports current alcohol use. She reports that she does not use drugs.    Home Medications:  Prior to Admission medications    Medication Sig Start Date End Date Taking? Authorizing Provider   albuterol sulfate  (90 Base) MCG/ACT inhaler Inhale 2 puffs Every 4 (Four) Hours As Needed for Wheezing or Shortness of Air. 7/5/24  Yes Michell Ramirez APRN   aspirin 81 MG EC tablet Take 1 tablet by mouth Daily.   Yes Florin Parker MD   budesonide-formoterol (SYMBICORT) 160-4.5 MCG/ACT inhaler INHALE 2 PUFFS BY MOUTH TWICE A DAY 10/16/24  Yes Michell Ramirez APRN   buPROPion (WELLBUTRIN) 100 MG tablet TAKE 1 TABLET BY MOUTH 2 TIMES A DAY 7/16/24  Yes Michell Ramirez APRN   CBD (cannabidiol) oral oil Take 1 drop by mouth Daily As Needed.   Yes Florin Parker MD   cloNIDine (CATAPRES) 0.2 MG tablet TAKE 1 TABLET BY MOUTH DAILY 7/11/24  Yes Michell Ramirez APRN   clopidogrel (PLAVIX) 75 MG tablet Take 1 tablet by mouth Daily. 2/4/25  Yes Michell Ramirez APRN   donepezil (ARICEPT) 10 MG tablet Take 1 tablet by mouth Every Night. 4/22/24  Yes Seipel, Joseph F, MD   gabapentin (NEURONTIN) 300 MG capsule TAKE 1 CAPSULE BY MOUTH 3 TIMES A DAY 7/11/24  Yes Michell Ramirez APRN   hydrOXYzine (ATARAX) 10 MG tablet TAKE 1 TABLET BY MOUTH DAILY 10/31/24  Yes Michell Ramirez APRN   levothyroxine (SYNTHROID, LEVOTHROID) 100 MCG tablet Take 1 tablet by mouth Daily. 2/26/24  Yes Florin Parker MD    meclizine (ANTIVERT) 25 MG tablet Take 1 tablet by mouth 3 (Three) Times a Day As Needed for Dizziness. 11/7/24  Yes Michell Ramirez APRN   memantine (NAMENDA) 10 MG tablet TAKE 1 TABLET BY MOUTH 2 TIMES A DAY 10/31/24  Yes Michell Ramirez APRN   metFORMIN (GLUCOPHAGE) 1000 MG tablet TAKE 1 TABLET BY MOUTH DAILY WITH BREAKFAST 7/16/24  Yes Michell Ramirez APRN   omeprazole (priLOSEC) 40 MG capsule TAKE 1 CAPSULE BY MOUTH DAILY 7/11/24  Yes Michell Ramirez APRN   pravastatin (PRAVACHOL) 20 MG tablet TAKE 1 TABLET BY MOUTH DAILY 10/31/24  Yes Michell Ramirez APRN   ramipril (ALTACE) 2.5 MG capsule TAKE 2 CAPSULES BY MOUTH DAILY 10/31/24  Yes Michell Ramirez APRN   rOPINIRole (REQUIP) 0.5 MG tablet TAKE 1 TABLET BY MOUTH EVERY NIGHT AT BEDTIME 1 HOUR BEFORE BEDTIME 7/11/24  Yes Michell Ramirez APRN   venlafaxine XR (EFFEXOR-XR) 150 MG 24 hr capsule Take 1 capsule by mouth Daily. 7/16/24  Yes Michell Ramirez APRN   vitamin B-12 (CYANOCOBALAMIN) 1000 MCG tablet Take 1 tablet by mouth Daily.   Yes Florin Parker MD   Vitamin D, Cholecalciferol, 50 MCG (2000 UT) capsule Take 1 capsule by mouth Daily.   Yes Florin Parker MD   traZODone (DESYREL) 50 MG tablet Take 1 tablet by mouth At Night As Needed for Sleep. 6/16/23 2/17/25 Yes Michell Ramirez APRN   amLODIPine (NORVASC) 5 MG tablet Take 1 tablet by mouth Daily. 9/21/23 2/17/25  Laila Alvarez MD   erythromycin (ROMYCIN) 5 MG/GM ophthalmic ointment  4/9/24 2/17/25  Florin Parker MD   nitrofurantoin, macrocrystal-monohydrate, (Macrobid) 100 MG capsule Take 1 capsule by mouth 2 (Two) Times a Day. 1/12/25 2/17/25  Deuce, Miriam L, APRN       Allergies:  No Known Allergies    Objective     Vitals:   Temp:  [98.4 °F (36.9 °C)-99.1 °F (37.3 °C)] 98.4 °F (36.9 °C)  Heart Rate:  [] 93  Resp:  [12-21] 21  BP: (101-129)/(49-87) 112/87  No intake or output data in the 24 hours ending 02/17/25  1701    Physical Exam:    General Appearance: alert, oriented x 3, no acute distress   Skin: warm and dry  HEENT: oral mucosa normal, nonicteric sclera  Neck: supple, no JVD  Lungs: CTA  Heart: RRR, normal S1 and S2  Abdomen: soft, nontender, nondistended  : no palpable bladder  Extremities: no edema, cyanosis or clubbing  Neuro: normal speech and mental status     Scheduled Meds:     aspirin, 81 mg, Oral, Daily  atorvastatin, 10 mg, Oral, Daily  budesonide-formoterol, 2 puff, Inhalation, BID - RT  buPROPion, 100 mg, Oral, BID  cefTRIAXone, 1,000 mg, Intravenous, Q24H  cholecalciferol, 1,000 Units, Oral, Daily  clopidogrel, 75 mg, Oral, Daily  donepezil, 10 mg, Oral, Nightly  enoxaparin, 30 mg, Subcutaneous, Q24H  gabapentin, 100 mg, Oral, TID  [Held by provider] hydrOXYzine, 10 mg, Oral, Daily  insulin lispro, 2-7 Units, Subcutaneous, 4x Daily AC & at Bedtime  levothyroxine, 100 mcg, Oral, Q AM  memantine, 5 mg, Oral, BID  pantoprazole, 40 mg, Oral, Q AM  sodium chloride, 10 mL, Intravenous, Q12H  venlafaxine XR, 150 mg, Oral, Daily  vitamin B-12, 1,000 mcg, Oral, Daily      IV Meds:   Pharmacy to Dose enoxaparin (LOVENOX),   sodium chloride, 100 mL/hr, Last Rate: 100 mL/hr (02/17/25 1343)        Results Reviewed:   I have personally reviewed the results from the time of this admission to 2/17/2025 17:01 EST     Lab Results   Component Value Date    GLUCOSE 234 (H) 02/17/2025    CALCIUM 8.9 02/17/2025     02/17/2025    K 3.1 (L) 02/17/2025    CO2 25.0 02/17/2025    CL 98 02/17/2025    BUN 25 (H) 02/17/2025    CREATININE 2.37 (H) 02/17/2025    EGFRIFNONA 74 12/09/2021    BCR 10.5 02/17/2025    ANIONGAP 14.0 02/17/2025      Lab Results   Component Value Date    ALBUMIN 4.3 02/16/2025     US Renal Bilateral    Result Date: 2/17/2025  US RENAL BILATERAL Date of Exam: 2/17/2025 12:55 PM EST Indication: elevated BUN/creatinine. Comparison: No comparisons available. Technique: Grayscale and color Doppler  ultrasound evaluation of the kidneys and urinary bladder was performed. Findings: RIGHT kidney measures 9.1 cm.  Kidney echogenicity, size, and vascularity appear within normal limits. There is no solid kidney mass.  No echogenic shadowing stone.  No hydronephrosis. LEFT kidney measures 9.4 cm. Kidney echogenicity, size, and vascularity appear within normal limits. There is no solid kidney mass.  No echogenic shadowing stone.  No hydronephrosis. There is a benign cyst measuring 1.1 x 0.8 x 0.8 cm. Limited visualization of the urinary bladder is unremarkable. Volume is 73 mL.     Impression: Impression: Benign left renal cyst otherwise unremarkable exam. Electronically Signed: Codie Garcia MD  2025 1:44 PM EST  Workstation ID: [id number]      Assessment / Plan     ASSESSMENT:  Sid-prerenal, due to hypovolemia in the setting of her uti most likely  Hypovolemia-due to decreased po intake  Hypotension-due to ramipril and hypovolemia  Uti with sepsis-on empiric abx  Altered mental status    PLAN:  Renal us ordered will review  Avoid nephrotoxins  Renally dose medicines  Continue ivf  Check lab in am    Thank you for involving us in the care of Radha Barbour.  Please feel free to call with any questions.    Fausto Guo MD  25  17:01 [facility]    [facility]  [phone]        Electronically signed by Fausto Guo MD at 25 9990       Nutrition Notes (most recent note)    No notes exist for this encounter.       Speech Language Pathology Notes (most recent note)    No notes exist for this encounter.       Tabatha Machado PT   Physical Therapist  Physical Therapy  Therapy Evaluation     Signed  Date of Service:  25 1040  Creation Time:  25 1444     Signed        Expand All Collapse All  Patient Name: Radha Barbour                : 1950                      MRN: [id number]                              Today's Date: 2025                                    Admit Date: 2/16/2025                        Visit Dx:   Visit Diagnosis       ICD-10-CM ICD-9-CM   1. Renal insufficiency, mild  N28.9 593.9   2. Weakness generalized  R53.1 780.79   3. UTI (urinary tract infection) with pyuria  N39.0 599.0         Problem List       Patient Active Problem List   Diagnosis    B12 deficiency    Back pain, chronic    Depression    Diabetic peripheral neuropathy    Restless leg syndrome    Osteoporosis    OAB (overactive bladder)    Lumbosacral radiculopathy    Insomnia    Hypothyroidism    HTN, goal below 130/80    Gastric reflux    Environmental and seasonal allergies    Dyslipidemia    DM type 2, goal HbA1c < 7%    Angina pectoris    SOB (shortness of breath)    Abnormal results of cardiovascular function studies    Dizziness    Cognitive impairment    Vitamin D deficiency    Diabetes mellitus    Primary hypertension    Anxiety    Neuropathy    Preventative health care    Coronary artery disease involving native heart without angina pectoris    Screening mammogram for breast cancer    Frequent falls    Bronchitis    Urinary tract infection without hematuria    Chronic obstructive pulmonary disease    Need for influenza vaccination    Anxiety and depression    Acute right-sided low back pain without sciatica    Encounter for subsequent annual wellness visit in Medicare patient    TIA (transient ischemic attack)    Asymptomatic menopause    Encounter for screening for osteoporosis    UTI (urinary tract infection)    MELODY (acute kidney injury)         Medical History        Past Medical History:   Diagnosis Date    Anxiety      Arthritis 1998    B12 deficiency 07/08/2016     Last Assessment & Plan:  Formatting of this note might be different from the original.  Compliant and controlled with current medication B 12 supplements . Lab today B 12    Back pain, chronic 07/08/2016     Last Assessment & Plan:  Formatting of this note might be different from the original. Will refer  to PT    Cancer 2009     Colon    Colon polyp 2005     Colon Cancer    Coronary artery disease involving native heart without angina pectoris 06/02/2022    Dementia 2021     Doctor prescribed medicine for dementia/memory loss    Depression 07/08/2016     Last Assessment & Plan:  Formatting of this note might be different from the original.  Compliant and controlled with current medication    Diabetes mellitus 06/02/2022    Diabetic peripheral neuropathy 02/14/2020     Last Assessment & Plan:  Formatting of this note might be different from the original. Stable    Difficulty walking 2022     Extreme unsteadiness on feet    DM type 2, goal HbA1c < 7% 03/27/2017     Last Assessment & Plan:  Formatting of this note might be different from the original.  Compliant and controlled with current diet.llabs today A1c    Dyslipidemia 10/11/2017     Last Assessment & Plan:  Formatting of this note might be different from the original.  Compliant and controlled with current medication/statin therapy/labs today    Environmental and seasonal allergies 10/11/2019    Gastric reflux 03/27/2017    HTN, goal below 130/80 02/10/2016     Last Assessment & Plan:  Formatting of this note might be different from the original. Compliant and controlled with current medication ramipril /labs today cmp to check renal function and electrolytes    Hyperlipidemia      Hypothyroidism 1990     Last Assessment & Plan:  Formatting of this note might be different from the original. TSH low reduce levothyroxine.  Will check TSH today.    Insomnia 01/16/2015    Lumbosacral radiculopathy 02/14/2020     Last Assessment & Plan:  Formatting of this note might be different from the original.  Compliant and controlled with current medication Muscle relaxer    Memory loss 2020    Osteoporosis 05/26/2020     Formatting of this note is different from the original. RADIOLOGY REPORT   FACILITY:  [place] [facility]/AGE/GENDER: MARTÍNCMACLRK  OP      AGE:69 Y           SEX:F PATIENT NAME/:  GM DERAS    1950 UNIT NUMBER:  [id number] ACCOUNT NUMBER:  [id number] ACCESSION NUMBER:  [id number]     EXAMINATION: Bone densitometry of multiple sites, lumbar spine, left hip and distal    Restless leg syndrome 2018     Last Assessment & Plan:  Formatting of this note might be different from the original.  Compliant and controlled with current medication requip    Shortness of breath      TIA (transient ischemic attack) 3/28/2024         Surgical History         Past Surgical History:   Procedure Laterality Date    BACK SURGERY   2007     lower    CARDIAC CATHETERIZATION Right 04/15/2022     Procedure: Left Heart Cath;  Surgeon: Laila Alvarez MD;  Location: [facility] CATH INVASIVE LOCATION;  Service: Cardiovascular;  Laterality: Right;    CARDIAC CATHETERIZATION   4/15/2022    COLON SURGERY       REPLACEMENT TOTAL KNEE Left      redone in     REPLACEMENT TOTAL KNEE Right            General Information         Row Name 25 1428                 Physical Therapy Time and Intention     Document Type evaluation  -DYLLAN       Mode of Treatment physical therapy  -DYLLAN          Row Name 25 1428                 General Information     Patient Profile Reviewed yes  -DYLLAN       Prior Level of Function independent:;all household mobility  Pt reports she is IND with mobility and ADLs and sometimes uses RW. She drives sometimes.  -DYLLAN       Existing Precautions/Restrictions orthostatic hypotension  -DYLLAN       Barriers to Rehab none identified  -DYLLAN          Row Name 25 1428                 Living Environment     People in Home sibling(s)  sister and MIREILLE  -DYLLAN       Name(s) of People in Home sister (Carolin) and MIREILLE  -DYLLAN          Row Name 25 1428                 Home Main Entrance     Number of Stairs, Main Entrance two  -DYLLAN       Stair Railings, Main Entrance railings safe and in good condition  -DYLLAN          Row Name 25 1420                  Stairs Within Home, Primary     Stairs, Within Home, Primary --  Pt does not have to use second floor.  -          Row Name 02/17/25 1428                 Cognition     Orientation Status (Cognition) oriented x 4  -          Row Name 02/17/25 1428                 Safety Issues/Impairments Affecting Functional Mobility     Safety Issues Affecting Function (Mobility) insight into deficits/self-awareness  -DYLLAN       Impairments Affecting Function (Mobility) balance;postural/trunk control;strength;endurance/activity tolerance  -DYLLAN                       User Key  (r) = Recorded By, (t) = Taken By, (c) = Cosigned By        Initials Name Provider Type     Tabatha Chaney PT Physical Therapist                            Mobility         Row Name 02/17/25 1433                 Bed Mobility     Bed Mobility bed mobility (all) activities  -DYLLAN       All Activities, [place] (Bed Mobility) standby assist  -DYLLAN       Assistive Device (Bed Mobility) head of bed elevated  -DYLLAN       Comment, (Bed Mobility) Sits EOB with SBA. Pt with post lean and needs frequent cues to correct.  -          Row Name 02/17/25 1433                 Sit-Stand Transfer     Sit-Stand [place] (Transfers) contact guard  -DYLLAN       Assistive Device (Sit-Stand Transfers) other (see comments)  -DYLLAN       Comment, (Sit-Stand Transfer) Pt performed 2 trials of SIT<>STAND from EOB with HHA for balance. Pt with sway and posterior lean but seems unaware of post lean.  -          Row Name 02/17/25 1433                 Gait/Stairs (Locomotion)     [place] Level (Gait) other (see comments)  -DYLLAN       Comment, (Gait/Stairs) Pt able to march in place with CGA-MIN for balance but unsafe to amb due to weakness and post lean  -DYLLAN                       User Key  (r) = Recorded By, (t) = Taken By, (c) = Cosigned By        Initials Name Provider Type     Tabatha Chaney PT Physical Therapist                            Obj/Interventions          Row Name 02/17/25 1434                 Range of Motion Comprehensive     General Range of Motion no range of motion deficits identified  -[facility] Name 02/17/25 1434                 Strength Comprehensive (MMT)     Comment, General Manual Muscle Testing (MMT) Assessment globalized weakness with functional mobility, but 4+/5 with LE MMT  -DYLLAN          Row Name 02/17/25 1434                 Balance     Balance Assessment sitting static balance;sitting dynamic balance;standing static balance;standing dynamic balance  -DYLLAN       Static Sitting Balance contact guard;minimal assist  -DYLLAN       Dynamic Sitting Balance contact guard;minimal assist  -DYLLAN       Position, Sitting Balance unsupported;sitting edge of bed  -DYLLAN       Static Standing Balance minimal assist;contact guard  -DYLLAN       Dynamic Standing Balance minimal assist  -DYLLAN       Position/Device Used, Standing Balance other (see comments)  with HHA  -DYLLAN          Row Name 02/17/25 1434                 Sensory Assessment (Somatosensory)     Sensory Assessment (Somatosensory) sensation intact  -DYLLAN                       User Key  (r) = Recorded By, (t) = Taken By, (c) = Cosigned By        Initials Name Provider Type     Tabatha Chaney, PT Physical Therapist                            Goals/Plan         [facility] Name 02/17/25 1439                 Bed Mobility Goal 1 (PT)     Activity/Assistive Device (Bed Mobility Goal 1, PT) bed mobility activities, all  -DYLLAN       [place] Level/Cues Needed (Bed Mobility Goal 1, PT) independent  -DYLLAN       Time Frame (Bed Mobility Goal 1, PT) 2 weeks  -[facility] Name 02/17/25 1439                 Transfer Goal 1 (PT)     Activity/Assistive Device (Transfer Goal 1, PT) transfers, all  -DYLLAN       [place] Level/Cues Needed (Transfer Goal 1, PT) modified independence  -DYLLAN       Time Frame (Transfer Goal 1, PT) 2 weeks  -[facility] Name 02/17/25 1439                 Gait Training Goal 1 (PT)     Activity/Assistive Device  (Gait Training Goal 1, PT) gait (walking locomotion)  -DYLLAN       [place] Level (Gait Training Goal 1, PT) modified independence  -DYLLAN       Distance (Gait Training Goal 1, PT) 100  -DYLLAN       Time Frame (Gait Training Goal 1, PT) 2 weeks  -DYLLAN          Row Name 02/17/25 1439                 Stairs Goal 1 (PT)     Activity/Assistive Device (Stairs Goal 1, PT) stairs, all skills  -DYLLAN       [place] Level/Cues Needed (Stairs Goal 1, PT) standby assist  -DYLLAN       Number of Stairs (Stairs Goal 1, PT) 2  -DYLLAN       Time Frame (Stairs Goal 1, PT) 2 weeks  -DYLLAN          Row Name 02/17/25 1439                 Therapy Assessment/Plan (PT)     Planned Therapy Interventions (PT) balance training;gait training;bed mobility training;patient/family education;postural re-education;strengthening;stair training;transfer training  -DYLLAN                    User Key  (r) = Recorded By, (t) = Taken By, (c) = Cosigned By        Initials Name Provider Type     DYLLAN Tabatha Machado, PT Physical Therapist                         Clinical Impression         Row Name 02/17/25 1435                 Pain     Pretreatment Pain Rating 0/10 - no pain  -DYLLAN       Posttreatment Pain Rating 0/10 - no pain  -DYLLAN          Row Name 02/17/25 1435                 Plan of Care Review     Plan of Care Reviewed With patient  -DYLLAN       Outcome Evaluation Radha Barbour is a 74 y.o. female with PMH of HTN, HLD, CAD, DM type II, dementia, hypothyroidism presented to  ED 2/16/2025 with family who stated the patient had an episode of altered mental status. Pt (+) for UTI  and with syncopal episodes. Pt lives at home with sister and MIREILLE and is typically ind/mod I with mobility and ADLs. Pt currently presents with generalized weakness and demonstrates mild posterior lean with all mobility. Pt asympomatic for orthostatic hypotension today. Pt would benefit from skilled PT to increase functional mobility. Would recommend SNF based on initial eval, but pt has potential to  progress to home with assist.  -DYLLAN          Row Name 02/17/25 1435                 Therapy Assessment/Plan (PT)     Rehab Potential (PT) good  -DYLLAN       Criteria for Skilled Interventions Met (PT) yes;meets criteria;skilled treatment is necessary  -DYLLAN       Therapy Frequency (PT) 5 times/wk  -DYLLAN       Predicted Duration of Therapy Intervention (PT) DC  -DYLLAN          Row Name 02/17/25 1435                 Positioning and Restraints     Pre-Treatment Position in bed  -DYLLAN       Post Treatment Position bed  -DYLLAN       In Bed notified nsg;fowlers;call light within reach;side rails up x2;encouraged to call for assist  -DYLLAN                       User Key  (r) = Recorded By, (t) = Taken By, (c) = Cosigned By        Initials Name Provider Type     Tabatha Chaney, PT Physical Therapist                            Outcome Measures         Row Name 02/17/25 1442 02/17/25 1200            How much help from another person do you currently need...     Turning from your back to your side while in flat bed without using bedrails? 4  -DYLLAN 4  -KB     Moving from lying on back to sitting on the side of a flat bed without bedrails? 3  -DYLLAN 4  -KB     Moving to and from a bed to a chair (including a wheelchair)? 3  -DYLLAN 2  -KB     Standing up from a chair using your arms (e.g., wheelchair, bedside chair)? 3  -DYLLAN 2  -KB     Climbing 3-5 steps with a railing? 1  -DYLLAN 1  -KB     To walk in hospital room? 2  -DYLLAN 1  -KB     AM-PAC 6 Clicks Score (PT) 16  -DYLLAN 14  -KB     Highest Level of Mobility Goal 5 --> Static standing  -DYLLAN 4 --> Transfer to chair/commode  -KB        Row Name 02/17/25 0806 02/17/25 0800            How much help from another person do you currently need...     Turning from your back to your side while in flat bed without using bedrails? 4  -ER 4  -KB     Moving from lying on back to sitting on the side of a flat bed without bedrails? 4  -ER 4  -KB     Moving to and from a bed to a chair (including a wheelchair)? 2  -ER 2  -KB      Standing up from a chair using your arms (e.g., wheelchair, bedside chair)? 2  -ER 2  -KB     Climbing 3-5 steps with a railing? 1  -ER 1  -KB     To walk in hospital room? 1  -ER 1  -KB     AM-PAC 6 Clicks Score (PT) 14  -ER 14  -KB     Highest Level of Mobility Goal 4 --> Transfer to chair/commode  -ER 4 --> Transfer to chair/commode  -KB        Row Name 02/17/25 1442                 Functional Assessment     Outcome Measure Options AM-PAC 6 Clicks Basic Mobility (PT)  -                       User Key  (r) = Recorded By, (t) = Taken By, (c) = Cosigned By        Initials Name Provider Type     Tabatha Chaney, PT Physical Therapist     Veronica Schaeffer, RN Registered Nurse     Jennifer Hernandez RN Registered Nurse                          Physical Therapy Education            Title: PT OT SLP Therapies (Done)         Topic: Physical Therapy (Done)         Point: Mobility training (Done)         Learning Progress Summary             Patient Acceptance, E,TB, VU by  at 2/17/2025 1442                            Point: Home exercise program (Done)         Learning Progress Summary             Patient Acceptance, E,TB, VU by  at 2/17/2025 1442                            Point: Body mechanics (Done)         Learning Progress Summary             Patient Acceptance, E,TB, VU by  at 2/17/2025 1442                            Point: Precautions (Done)         Learning Progress Summary             Patient Acceptance, E,TB, VU by  at 2/17/2025 1442                                                User Key         Initials Effective Dates Name Provider Type [facility] 01/06/25 -  Tabatha Machado, PT Physical Therapist PT                          PT Recommendation and Plan  Planned Therapy Interventions (PT): balance training, gait training, bed mobility training, patient/family education, postural re-education, strengthening, stair training, transfer training  Outcome Evaluation: Radha Barbour is a 74 y.o.  female with PMH of HTN, HLD, CAD, DM type II, dementia, hypothyroidism presented to  ED 2/16/2025 with family who stated the patient had an episode of altered mental status. Pt (+) for UTI  and with syncopal episodes. Pt lives at home with sister and MIREILLE and is typically ind/mod I with mobility and ADLs. Pt currently presents with generalized weakness and demonstrates mild posterior lean with all mobility. Pt asympomatic for orthostatic hypotension today. Pt would benefit from skilled PT to increase functional mobility. Would recommend SNF based on initial eval, but pt has potential to progress to home with assist.      Time Calculation:   PT Evaluation Complexity  History, PT Evaluation Complexity: 1-2 personal factors and/or comorbidities  Examination of Body Systems (PT Eval Complexity): total of 3 or more elements  Clinical Presentation (PT Evaluation Complexity): stable  Clinical Decision Making (PT Evaluation Complexity): low complexity  Overall Complexity (PT Evaluation Complexity): low complexity       PT Charges         Row Name 02/17/25 1443                       Time Calculation     Start Time 1040  -DYLLAN         Stop Time 1100  -DYLLAN         Time Calculation (min) 20 min  -DYLLAN         PT Received On 02/17/25  -DYLLAN         PT - Next Appointment 02/18/25  -DYLLAN         PT Goal Re-Cert Due Date 03/03/25  -DYLLAN                 Time Calculation- PT     Total Timed Code Minutes- PT 0 minute(s)  -YDLLAN                      User Key  (r) = Recorded By, (t) = Taken By, (c) = Cosigned By        Initials Name Provider Type     Tabatha Chaney PT Physical Therapist                       Therapy Charges for Today         Code Description Service Date Service Provider Modifiers Qty     92205526452 HC PT EVAL LOW COMPLEXITY 4 2/17/2025 Tabatha Machado, PT GP 1                PT G-Codes  Outcome Measure Options: AM-PAC 6 Clicks Basic Mobility (PT)  AM-PAC 6 Clicks Score (PT): 16  PT Discharge Summary  Anticipated Discharge Disposition  (PT): skilled nursing facility (May progress to home with assist)     Tabatha Machado, PT             2025                                    Ammy Kee OT   Occupational Therapist  Occupational Therapy  Therapy Evaluation     Signed  Date of Service:  25  Creation Time:  25     Signed        Expand All Collapse All  Patient Name: Radha Barbour                : 1950                      MRN: [id number]                              Today's Date: 2025                                   Admit Date: 2025                        Visit Dx:   Visit Diagnosis       ICD-10-CM ICD-9-CM   1. Renal insufficiency, mild  N28.9 593.9   2. Weakness generalized  R53.1 780.79   3. UTI (urinary tract infection) with pyuria  N39.0 599.0         Problem List       Patient Active Problem List   Diagnosis    B12 deficiency    Back pain, chronic    Depression    Diabetic peripheral neuropathy    Restless leg syndrome    Osteoporosis    OAB (overactive bladder)    Lumbosacral radiculopathy    Insomnia    Hypothyroidism    HTN, goal below 130/80    Gastric reflux    Environmental and seasonal allergies    Dyslipidemia    DM type 2, goal HbA1c < 7%    Angina pectoris    SOB (shortness of breath)    Abnormal results of cardiovascular function studies    Dizziness    Cognitive impairment    Vitamin D deficiency    Diabetes mellitus    Primary hypertension    Anxiety    Neuropathy    Preventative health care    Coronary artery disease involving native heart without angina pectoris    Screening mammogram for breast cancer    Frequent falls    Bronchitis    Urinary tract infection without hematuria    Chronic obstructive pulmonary disease    Need for influenza vaccination    Anxiety and depression    Acute right-sided low back pain without sciatica    Encounter for subsequent annual wellness visit in Medicare patient    TIA (transient ischemic attack)    Asymptomatic menopause    Encounter for  screening for osteoporosis    UTI (urinary tract infection)    MELODY (acute kidney injury)         Medical History        Past Medical History:   Diagnosis Date    Anxiety      Arthritis 1998    B12 deficiency 07/08/2016     Last Assessment & Plan:  Formatting of this note might be different from the original.  Compliant and controlled with current medication B 12 supplements . Lab today B 12    Back pain, chronic 07/08/2016     Last Assessment & Plan:  Formatting of this note might be different from the original. Will refer to PT    Cancer 2009     Colon    Colon polyp 2005     Colon Cancer    Coronary artery disease involving native heart without angina pectoris 06/02/2022    Dementia 2021     Doctor prescribed medicine for dementia/memory loss    Depression 07/08/2016     Last Assessment & Plan:  Formatting of this note might be different from the original.  Compliant and controlled with current medication    Diabetes mellitus 06/02/2022    Diabetic peripheral neuropathy 02/14/2020     Last Assessment & Plan:  Formatting of this note might be different from the original. Stable    Difficulty walking 2022     Extreme unsteadiness on feet    DM type 2, goal HbA1c < 7% 03/27/2017     Last Assessment & Plan:  Formatting of this note might be different from the original.  Compliant and controlled with current diet.llabs today A1c    Dyslipidemia 10/11/2017     Last Assessment & Plan:  Formatting of this note might be different from the original.  Compliant and controlled with current medication/statin therapy/labs today    Environmental and seasonal allergies 10/11/2019    Gastric reflux 03/27/2017    HTN, goal below 130/80 02/10/2016     Last Assessment & Plan:  Formatting of this note might be different from the original. Compliant and controlled with current medication ramipril /labs today cmp to check renal function and electrolytes    Hyperlipidemia      Hypothyroidism 1990     Last Assessment & Plan:  Formatting  of this note might be different from the original. TSH low reduce levothyroxine.  Will check TSH today.    Insomnia 2015    Lumbosacral radiculopathy 2020     Last Assessment & Plan:  Formatting of this note might be different from the original.  Compliant and controlled with current medication Muscle relaxer    Memory loss     Osteoporosis 2020     Formatting of this note is different from the original. RADIOLOGY REPORT   FACILITY:  [facility]/AGE/GENDER: [id number]  OP      AGE:69 Y          SEX:F PATIENT NAME/:  GM DERAS    1950 UNIT NUMBER:  [id number] ACCOUNT NUMBER:  [id number] ACCESSION NUMBER:  [id number]     EXAMINATION: Bone densitometry of multiple sites, lumbar spine, left hip and distal    Restless leg syndrome 2018     Last Assessment & Plan:  Formatting of this note might be different from the original.  Compliant and controlled with current medication requip    Shortness of breath      TIA (transient ischemic attack) 3/28/2024         Surgical History         Past Surgical History:   Procedure Laterality Date    BACK SURGERY   2007     lower    CARDIAC CATHETERIZATION Right 04/15/2022     Procedure: Left Heart Cath;  Surgeon: Laila Alvarez MD;  Location: [facility] CATH INVASIVE LOC[facility];  Service: Cardiovascular;  Laterality: Right;    CARDIAC CATHETERIZATION   4/15/2022    COLON SURGERY       REPLACEMENT TOTAL KNEE Left      redone in     REPLACEMENT TOTAL KNEE Right            General Information         Row Name 25 1443                 OT Time and Intention     Patient Effort adequate  -MH          Row Name 25 1443                 General Information     Prior Level of Function independent:;all household mobility;ADL's  has RW but doesn't use it. Does not use grab bars, shower chair, etc.  -MH       Existing Precautions/Restrictions orthostatic hypotension  -MH       Barriers to Rehab none  identified  -          Row Name 02/17/25 1443                 Living Environment     People in Home sibling(s)  sister & MIREILLE.  -          Row Name 02/17/25 1443                 Home Main Entrance     Number of Stairs, Main Entrance two  -       Stair Railings, Main Entrance railings safe and in good condition  -          Row Name 02/17/25 1443                 Stairs Within Home, Primary     Stairs, Within Home, Primary lives in basement  -       Number of Stairs, Within Home, Primary twelve  pt lives in the basement  -       Stair Railings, Within Home, Primary railings safe and in good condition;railing on right side (ascending)  -          Row Name 02/17/25 1508 02/17/25 1443            Cognition     Orientation Status (Cognition) verbal cues/prompts needed for orientation;other (see comments)  forgetful but grossly oriented  - oriented x 4  -        Row Name 02/17/25 1443                 Safety Issues/Impairments Affecting Functional Mobility     Impairments Affecting Function (Mobility) balance;endurance/activity tolerance  -                       User Key  (r) = Recorded By, (t) = Taken By, (c) = Cosigned By        Initials Name Provider Type      Ammy Kee, OT Occupational Therapist                                     Mobility/ADL's         Row Name 02/17/25 1507                 Bed Mobility     Bed Mobility bed mobility (all) activities  -       All Activities, [place] (Bed Mobility) standby assist  -       Comment, (Bed Mobility) when pt sits up she then needs support and when she stands she becomes pre-syncopal with [place]. But in supine pt appears strong and can roll & scoot herself up with only line management.  -          Row Name 02/17/25 1503                 Functional Mobility     Patient was able to Ambulate no, other medical factors prevent ambulation  -       Reason Patient was unable to Ambulate Hypotension  -          Row Name 02/17/25 1505                  Activities of Daily Living     BADL Assessment/Intervention feeding;grooming;toileting  -[facility] Name 02/17/25 1509                 Self-Feeding Assessment/Training     [place] Level (Feeding) feeding skills;modified independence  -       Position (Feeding) sitting up in bed  -[facility] Name 02/17/25 1509                 Grooming Assessment/Training     [place] Level (Grooming) grooming skills;minimum assist (75% patient effort)  -       Position (Grooming) supported sitting  -[facility] Name 02/17/25 1509                 Toileting Assessment/Training     [place] Level (Toileting) toileting skills;dependent (less than 25% patient effort)  -       Position (Toileting) supine  -                       User Key  (r) = Recorded By, (t) = Taken By, (c) = Cosigned By        Initials Name Provider Type     Ammy Cardenas OT Occupational Therapist                            Obj/Interventions         [facility] Name 02/17/25 1511                 Sensory Assessment (Somatosensory)     Sensory Assessment (Somatosensory) sensation intact  -MH          Row Name 02/17/25 1511                 Vision Assessment/Intervention     Visual Impairment/Limitations WFL;corrective lenses for reading  -       Vision Assessment Comment working a crossword puzzle with readers.  -[facility] Name 02/17/25 1511                 Range of Motion Comprehensive     General Range of Motion no range of motion deficits identified  -MH          Row Name 02/17/25 1511                 Strength Comprehensive (MMT)     Comment, General Manual Muscle Testing (MMT) Assessment in supine MMT is WFL but pt becomes acutely weak when trying to stand.  -                       User Key  (r) = Recorded By, (t) = Taken By, (c) = Cosigned By        Initials Name Provider Type     Ammy Cardenas OT Occupational Therapist                            Goals/Plan         Row Name 02/17/25 1551                 Transfer Goal 1  (OT)     Activity/Assistive Device (Transfer Goal 1, OT) transfers, all  -       [place] Level/Cues Needed (Transfer Goal 1, OT) contact guard required  -       Time Frame (Transfer Goal 1, OT) 2 weeks  -          Row Name 02/17/25 1551                 Bathing Goal 1 (OT)     Activity/Device (Bathing Goal 1, OT) bathing skills, all  -MH       [place] Level/Cues Needed (Bathing Goal 1, OT) verbal cues required;standby assist  -       Time Frame (Bathing Goal 1, OT) 2 weeks  -          Row Name 02/17/25 1551                 Dressing Goal 1 (OT)     Activity/Device (Dressing Goal 1, OT) dressing skills, all  -MH       [place]/Cues Needed (Dressing Goal 1, OT) set-up required;supervision required  -       Time Frame (Dressing Goal 1, OT) 2 weeks  -          Row Name 02/17/25 1551                 Therapy Assessment/Plan (OT)     Planned Therapy Interventions (OT) activity tolerance training;adaptive equipment training;BADL retraining;functional balance retraining;patient/caregiver education/training;occupation/activity based interventions;strengthening exercise  -                    User Key  (r) = Recorded By, (t) = Taken By, (c) = Cosigned By        Initials Name Provider Type      Ammy Kee, OT Occupational Therapist                         Clinical Impression         Row Name 02/17/25 1546                 Pain Assessment     Pretreatment Pain Rating 0/10 - no pain  -       Posttreatment Pain Rating 0/10 - no pain  -          Row Name 02/17/25 1546                 Plan of Care Review     Plan of Care Reviewed With patient  -       Progress no change  -       Outcome Evaluation 74 y.o. female with PMH of HTN, HLD, CAD, DM type II, dementia, hypothyroidism presented to  ED 2/16/2025 with family who stated the patient had an episode of altered mental status this afternoon. She had an incontinent episode of stool this afternoon and after cleaning her up the daughter went to  sit her in the chair and the patient had an episode where she was poorly responsive. EMS was called and she had a low BP per the daughter. She was just discharged from rehab two days ago and pt reports she was okay at first but quickly became very lethargic and foggy. Pt is (+) UTI and Rhinovirus. She lives with her sister & MIREILLE in a home with several steps to enter and patient's living quarters are then in the basement. Pt has been very weak with trying to stand up, but can move herself around in the bed in supine with good strength. She is grossly oriented but forgetful and aware of this. Pt needs assist for ADl and basic mobility at this time is not safe to go home with ffamily. recommend SNF for addittional rehab.  -          Row Name 02/17/25 1546                 Therapy Assessment/Plan (OT)     Rehab Potential (OT) good  -       Criteria for Skilled Therapeutic Interventions Met (OT) skilled treatment is necessary  -       Therapy Frequency (OT) 3 times/wk  -       Predicted Duration of Therapy Intervention (OT) until d/c  -          Row Name 02/17/25 1546                 Therapy Plan Review/Discharge Plan (OT)     Anticipated Discharge Disposition (OT) skilled nursing facility  -          Row Name 02/17/25 1546                 Positioning and Restraints     Pre-Treatment Position in bed  -       Post Treatment Position bed  -       In Bed notified nsg;fowlers;call light within reach;encouraged to call for assist;exit alarm on  -                       User Key  (r) = Recorded By, (t) = Taken By, (c) = Cosigned By        Initials Name Provider Type      Ammy Kee, OT Occupational Therapist                            Outcome Measures         Row Name 02/17/25 1442 02/17/25 1200            How much help from another person do you currently need...     Turning from your back to your side while in flat bed without using bedrails? 4  -DYLLAN 4  -KB     Moving from lying on back to sitting on the  side of a flat bed without bedrails? 3  -DYLLAN 4  -KB     Moving to and from a bed to a chair (including a wheelchair)? 3  -DYLLAN 2  -KB     Standing up from a chair using your arms (e.g., wheelchair, bedside chair)? 3  -DYLLAN 2  -KB     Climbing 3-5 steps with a railing? 1  -DYLLAN 1  -KB     To walk in hospital room? 2  -DYLLAN 1  -KB     AM-PAC 6 Clicks Score (PT) 16  -DYLLAN 14  -KB     Highest Level of Mobility Goal 5 --> Static standing  -DYLLAN 4 --> Transfer to chair/commode  -KB        Row Name 02/17/25 0806 02/17/25 0800            How much help from another person do you currently need...     Turning from your back to your side while in flat bed without using bedrails? 4  -ER 4  -KB     Moving from lying on back to sitting on the side of a flat bed without bedrails? 4  -ER 4  -KB     Moving to and from a bed to a chair (including a wheelchair)? 2  -ER 2  -KB     Standing up from a chair using your arms (e.g., wheelchair, bedside chair)? 2  -ER 2  -KB     Climbing 3-5 steps with a railing? 1  -ER 1  -KB     To walk in hospital room? 1  -ER 1  -KB     AM-PAC 6 Clicks Score (PT) 14  -ER 14  -KB     Highest Level of Mobility Goal 4 --> Transfer to chair/commode  -ER 4 --> Transfer to chair/commode  -KB        Row Name 02/17/25 1442                 Functional Assessment     Outcome Measure Options AM-PAC 6 Clicks Basic Mobility (PT)  -DYLLAN                       User Key  (r) = Recorded By, (t) = Taken By, (c) = Cosigned By        Initials Name Provider Type     Tabatha Chaney, PT Physical Therapist     Veronica Schaeffer, RN Registered Nurse     Jennifer Hernandez RN Registered Nurse                             Occupational Therapy Education            Title: PT OT SLP Therapies (In Progress)         Topic: Occupational Therapy (In Progress)         Point: ADL training (Done)         Description:   Instruct learner(s) on proper safety adaptation and remediation techniques during self care or transfers.   Instruct in proper use of  assistive devices.                       Learning Progress Summary             Patient Acceptance, E, VU,NR by  at 2/17/2025 1552                            Point: Home exercise program (Not Started)         Description:   Instruct learner(s) on appropriate technique for monitoring, assisting and/or progressing therapeutic exercises/activities.                       Learner Progress:  Not documented in this visit.                  Point: Precautions (Done)         Description:   Instruct learner(s) on prescribed precautions during self-care and functional transfers.                       Learning Progress Summary             Patient Acceptance, E, VU,NR by  at 2/17/2025 1552                            Point: Body mechanics (Done)         Description:   Instruct learner(s) on proper positioning and spine alignment during self-care, functional mobility activities and/or exercises.                       Learning Progress Summary             Patient Acceptance, E, VU,NR by  at 2/17/2025 1552                                                User Key         Initials Effective Dates Name Provider Type Discipline      06/16/21 -  Ammy Kee OT Occupational Therapist OT                          OT Recommendation and Plan  Planned Therapy Interventions (OT): activity tolerance training, adaptive equipment training, BADL retraining, functional balance retraining, patient/caregiver education/training, occupation/activity based interventions, strengthening exercise  Therapy Frequency (OT): 3 times/wk  Plan of Care Review  Plan of Care Reviewed With: patient  Progress: no change  Outcome Evaluation: 74 y.o. female with PMH of HTN, HLD, CAD, DM type II, dementia, hypothyroidism presented to  ED 2/16/2025 with family who stated the patient had an episode of altered mental status this afternoon. She had an incontinent episode of stool this afternoon and after cleaning her up the daughter went to sit her in the chair  and the patient had an episode where she was poorly responsive. EMS was called and she had a low BP per the daughter. She was just discharged from rehab two days ago and pt reports she was okay at first but quickly became very lethargic and foggy. Pt is (+) UTI and Rhinovirus. She lives with her sister & MIREILLE in a home with several steps to enter and patient's living quarters are then in the basement. Pt has been very weak with trying to stand up, but can move herself around in the bed in supine with good strength. She is grossly oriented but forgetful and aware of this. Pt needs assist for ADl and basic mobility at this time is not safe to go home with ffamily. recommend SNF for addittional rehab.      Time Calculation:        Time Calculation- OT         Row Name 02/17/25 1552                       Time Calculation-      OT Start Time 1437  -         OT Stop Time 1500  -         OT Time Calculation (min) 23 min  -         Total Timed Code Minutes- OT 0 minute(s)  -         OT Received On 02/17/25  -         OT - Next Appointment 02/19/25  -         OT Goal Re-Cert Due Date 03/03/25  -                      User Key  (r) = Recorded By, (t) = Taken By, (c) = Cosigned By        Initials Name Provider Type      Ammy Kee OT Occupational Therapist                       Therapy Charges for Today         Code Description Service Date Service Provider Modifiers Qty     [id number]  OT EVAL MOD COMPLEXITY 3 2/17/2025 Ammy Kee OT GO 1                   Ammy Kee OT                 2/17/2025

## 2025-02-18 NOTE — CASE MANAGEMENT/SOCIAL WORK
Social Work Assessment  [facility]     Patient Name: Radha Barbour  MRN: [id number]  Today's Date: 2/18/2025    Admit Date: 2/16/2025       Discharge Plan       Row Name 02/18/25 1525       Plan    Plan [facility] accepted. Precert required. PASRR QFR.    Plan Comments CM updated on status of PASRR.        Leida Hurt MSW, LSW    Phone: [phone]  Fax: [phone]  [email or web address]

## 2025-02-18 NOTE — NURSING NOTE
Patient going to room 375, when clean report called to Kaylee RN,  patient updated on plan of care.

## 2025-02-18 NOTE — PROGRESS NOTES
[facility] Progress Note      Patient Name: Rahda Barbour  : 1950  MRN: [id number]  Primary Care Physician:  Michell Ramirez APRN  Date of admission: 2025    Subjective     Interval History:   No soa or cp    Review of Systems:   14 point review of systems is otherwise negative except for mentioned above on HPI    Objective     Vitals:   Temp:  [97.6 °F (36.4 °C)-98.4 °F (36.9 °C)] 97.9 °F (36.6 °C)  Heart Rate:  [73-93] 80  Resp:  [12-21] 20  BP: (112-179)/(56-87) 179/79    Intake/Output Summary (Last 24 hours) at 2025 1012  Last data filed at 2025 1000  Gross per 24 hour   Intake 960 ml   Output --   Net 960 ml       Physical Exam:    General Appearance: alert, oriented x 3, no acute distress   Skin: warm and dry  HEENT: oral mucosa normal, nonicteric sclera  Neck: supple, no JVD  Lungs: CTA  Heart: RRR, normal S1 and S2  Abdomen: soft, nontender, nondistended  : no palpable bladder  Extremities: no edema, cyanosis or clubbing  Neuro: normal speech and mental status     Scheduled Meds:     aspirin, 81 mg, Oral, Daily  atorvastatin, 10 mg, Oral, Daily  budesonide-formoterol, 2 puff, Inhalation, BID - RT  buPROPion, 100 mg, Oral, BID  cefTRIAXone, 1,000 mg, Intravenous, Q24H  cholecalciferol, 1,000 Units, Oral, Daily  clopidogrel, 75 mg, Oral, Daily  donepezil, 10 mg, Oral, Nightly  enoxaparin, 30 mg, Subcutaneous, Q24H  gabapentin, 100 mg, Oral, TID  [Held by provider] hydrOXYzine, 10 mg, Oral, Daily  insulin lispro, 2-7 Units, Subcutaneous, 4x Daily AC & at Bedtime  levothyroxine, 100 mcg, Oral, Q AM  memantine, 5 mg, Oral, BID  pantoprazole, 40 mg, Oral, Q AM  sodium chloride, 10 mL, Intravenous, Q12H  venlafaxine XR, 150 mg, Oral, Daily  vitamin B-12, 1,000 mcg, Oral, Daily      IV Meds:   Pharmacy to Dose enoxaparin (LOVENOX),         Results Reviewed:   I have personally reviewed the results from the time of this admission to 2025 10:12 EST      Results from last 7 days   Lab Units 02/18/25  0212 02/17/25  0116 02/16/25 2032   SODIUM mmol/L 145 137 140   POTASSIUM mmol/L 3.5 3.1* 3.9   CHLORIDE mmol/L 112* 98 99   CO2 mmol/L 23.0 25.0 24.7   BUN mg/dL 23 25* 24*   CREATININE mg/dL 1.18* 2.37* 2.35*   CALCIUM mg/dL 8.2* 8.9 9.7   BILIRUBIN mg/dL  --   --  0.3   ALK PHOS U/L  --   --  87   ALT (SGPT) U/L  --   --  10   AST (SGOT) U/L  --   --  26   GLUCOSE mg/dL 138* 234* 125*     Estimated Creatinine Clearance: 41.7 mL/min (A) (by C-G formula based on SCr of 1.18 mg/dL (H)).  Results from last 7 days   Lab Units 02/18/25 0212   MAGNESIUM mg/dL 1.6   PHOSPHORUS mg/dL 3.2     Results from last 7 days   Lab Units 02/17/25 0116   URIC ACID mg/dL 7.7*     Results from last 7 days   Lab Units 02/18/25  0212 02/17/25  0116 02/16/25 2032   WBC 10*3/mm3 6.46 9.90 11.15*   HEMOGLOBIN g/dL 9.9* 12.4 14.5   PLATELETS 10*3/mm3 246 400 423           Assessment / Plan     ASSESSMENT:    Sid-prerenal, due to hypovolemia in the setting of her uti most likely, resolved with hydration  Hypovolemia-due to decreased po intake  Hypotension-due to ramipril and hypovolemia  Uti with sepsis-on empiric abx  Altered mental status     PLAN:  [place] ivf  Avoid nephrotoxins  Encouraged po fluids  I'll sign off but remain available, thank you    Thank you for involving us in the care of Radha Barbour.  Please feel free to call with any questions.    Fausto Guo MD  02/18/25  10:12 [facility]    [facility]  [phone]

## 2025-02-18 NOTE — PROGRESS NOTES
Radha Barbour is a 74 y.o. female admitted with UTI.     Recent Labs     02/16/25 2032 02/17/25  0116 02/18/25  0212   CREATININE 2.35* 2.37* 1.18*   BUN 24* 25* 23    137 145   K 3.9 3.1* 3.5   CL 99 98 112*   CO2 24.7 25.0 23.0     Estimated Creatinine Clearance: 41.9 mL/min (A) (by C-G formula based on SCr of 1.18 mg/dL (H)).    Assessment/Plan  Lovenox and memantine renally adjusted to 40 mg subq daily and 10 mg BID, respectively, per the Adult Renal Dosing of Medication policy for estCrCl > 30 mL/min    Tyrese Amos, [facility]  2/18/2025

## 2025-02-18 NOTE — CASE MANAGEMENT/SOCIAL WORK
Continued Stay Note  [facility]     Patient Name: Radha Barbour  MRN: [id number]  Today's Date: 2/18/2025    Admit Date: 2/16/2025    Plan: Home w/ family. SNF choices pending.   Discharge Plan       Row Name 02/18/25 1046       Plan    Plan Home w/ family. SNF choices pending.    Patient/Family in Agreement with Plan yes    Plan Comments CM went to the bedside and spoke with the patient about SNF choices. Per the patient she is agreeable but does not want to pick the facility at this time and would like CM to call her daughter, Mal and have her make the choice. CM attempted to call Mal and left a message for a call back.                   Met with patient in room wearing PPE: mask.    Maintained distance greater than six feet and spent less than 15 minutes in the room.       Amber Monterroso RN

## 2025-02-18 NOTE — CASE MANAGEMENT/SOCIAL WORK
Continued Stay Note  [facility]     Patient Name: Radha Barbour  MRN: [id number]  Today's Date: 2/18/2025    Admit Date: 2/16/2025    Plan: [facility] accepted. Precert required. PASRR required.   Discharge Plan       Row Name 02/18/25 1306       Plan    Plan [facility] accepted. Precert required. PASRR required.    Patient/Family in Agreement with Plan yes    Plan Comments Per Silvia with [facility] they are able to accept the patient when she is medically stable. CM sent secure chat to [facility] asking for PASRR to be done. Pharmacy updated.      Row Name 02/18/25 1055       Plan    Plan Comments Update: Daughter called CM back and let it be known that they would like her to go back to [facility] SNF since she was just discharged from the facility last week. CM sent referral to kyle [facility]low [facility]ryn, pending reponse.      Row Name 02/18/25 1046       Plan    Plan Home w/ family. SNF choices pending.    Patient/Family in Agreement with Plan yes    Plan Comments CM went to the bedside and spoke with the patient about SNF choices. Per the patient she is agreeable but does not want to pick the facility at this time and would like CM to call her daughter, Mal and have her make the choice. CM attempted to call Mal and left a message for a call back.               Phone communication or documentation only - no physical contact with patient or family.       Amber Monterroso RN

## 2025-02-18 NOTE — PLAN OF CARE
Goal Outcome Evaluation:  Plan of Care Reviewed With: patient           Outcome Evaluation: Patient was a transfer from ER for UTI and Rhino. Patient is alert and oriented x4. On strict I/O's. External cath applied. On IV rocephin. Positive urine culture for e.coli. Neph has signed off already. [facility]. Referral out to [facility]. No other complaints at this time. Continuing to monitor.

## 2025-02-18 NOTE — PLAN OF CARE
Goal Outcome Evaluation:                              Problem: Adult Inpatient Plan of Care  Goal: Plan of Care Review  Outcome: Progressing  Goal: Patient-Specific Goal (Individualized)  Outcome: Progressing  Goal: Absence of Hospital-Acquired Illness or Injury  Outcome: Progressing  Intervention: Identify and Manage Fall Risk  Recent Flowsheet Documentation  Taken 2/18/2025 0200 by Gurinder Robertson RN  Safety Promotion/Fall Prevention:   safety round/check completed   room organization consistent   nonskid shoes/slippers when out of bed   lighting adjusted   assistive device/personal items within reach   clutter free environment maintained   fall prevention program maintained  Taken 2/18/2025 0000 by Gurinder Robertson RN  Safety Promotion/Fall Prevention:   safety round/check completed   room organization consistent   nonskid shoes/slippers when out of bed   lighting adjusted   assistive device/personal items within reach   clutter free environment maintained   fall prevention program maintained  Taken 2/17/2025 2200 by Gurinder Robertson RN  Safety Promotion/Fall Prevention:   safety round/check completed   room organization consistent   nonskid shoes/slippers when out of bed   lighting adjusted   assistive device/personal items within reach   clutter free environment maintained   fall prevention program maintained  Taken 2/17/2025 2000 by Gurinder Robertson RN  Safety Promotion/Fall Prevention:   safety round/check completed   room organization consistent   nonskid shoes/slippers when out of bed   lighting adjusted   assistive device/personal items within reach   clutter free environment maintained   fall prevention program maintained  Intervention: Prevent Skin Injury  Recent Flowsheet Documentation  Taken 2/18/2025 0000 by Gurinder Robertson RN  Body Position: position changed independently  Taken 2/17/2025 2000 by Gurinder Robertson RN  Body Position: position changed independently  Intervention: Prevent  Infection  Recent Flowsheet Documentation  Taken 2/18/2025 0200 by Gurinder Robertson RN  Infection Prevention:   equipment surfaces disinfected   hand hygiene promoted   personal protective equipment utilized   rest/sleep promoted   single patient room provided  Taken 2/18/2025 0000 by Gurinder Robertson RN  Infection Prevention:   equipment surfaces disinfected   hand hygiene promoted   personal protective equipment utilized   rest/sleep promoted   single patient room provided  Taken 2/17/2025 2200 by Gurinder Robertson RN  Infection Prevention:   equipment surfaces disinfected   hand hygiene promoted   personal protective equipment utilized   rest/sleep promoted   single patient room provided  Taken 2/17/2025 2000 by Gurinder Robertson RN  Infection Prevention:   equipment surfaces disinfected   hand hygiene promoted   personal protective equipment utilized   rest/sleep promoted   single patient room provided  Goal: Optimal Comfort and Wellbeing  Outcome: Progressing  Intervention: Monitor Pain and Promote Comfort  Recent Flowsheet Documentation  Taken 2/17/2025 2000 by Gurinder Robertson RN  Pain Management Interventions:   care clustered   pillow support provided   position adjusted   quiet environment facilitated  Intervention: Provide Person-Centered Care  Recent Flowsheet Documentation  Taken 2/17/2025 2000 by Gurinder Robertson RN  Trust Relationship/Rapport:   care explained   choices provided   emotional support provided   empathic listening provided   questions answered   questions encouraged   reassurance provided   thoughts/feelings acknowledged  Goal: Readiness for Transition of Care  Outcome: Progressing     Problem: Fall Injury Risk  Goal: Absence of Fall and Fall-Related Injury  Outcome: Progressing  Intervention: Identify and Manage Contributors  Recent Flowsheet Documentation  Taken 2/18/2025 0200 by Gurinder Robertson RN  Medication Review/Management: medications reviewed  Taken 2/18/2025 0000 by Ailyn  Gurinder HUMPHREY RN  Medication Review/Management: medications reviewed  Taken 2/17/2025 2200 by Gurinder Robertson RN  Medication Review/Management: medications reviewed  Taken 2/17/2025 2000 by Gurinder Robertson RN  Medication Review/Management: medications reviewed  Self-Care Promotion: independence encouraged  Intervention: Promote Injury-Free Environment  Recent Flowsheet Documentation  Taken 2/18/2025 0200 by Gurinder Robertson RN  Safety Promotion/Fall Prevention:   safety round/check completed   room organization consistent   nonskid shoes/slippers when out of bed   lighting adjusted   assistive device/personal items within reach   clutter free environment maintained   fall prevention program maintained  Taken 2/18/2025 0000 by Gurinder Robertson RN  Safety Promotion/Fall Prevention:   safety round/check completed   room organization consistent   nonskid shoes/slippers when out of bed   lighting adjusted   assistive device/personal items within reach   clutter free environment maintained   fall prevention program maintained  Taken 2/17/2025 2200 by Gurinder Robertson RN  Safety Promotion/Fall Prevention:   safety round/check completed   room organization consistent   nonskid shoes/slippers when out of bed   lighting adjusted   assistive device/personal items within reach   clutter free environment maintained   fall prevention program maintained  Taken 2/17/2025 2000 by Gurinder Robertson RN  Safety Promotion/Fall Prevention:   safety round/check completed   room organization consistent   nonskid shoes/slippers when out of bed   lighting adjusted   assistive device/personal items within reach   clutter free environment maintained   fall prevention program maintained     Problem: Violence Risk or Actual  Goal: Anger and Impulse Control  Outcome: Progressing  Intervention: Minimize Safety Risk  Recent Flowsheet Documentation  Taken 2/17/2025 2000 by Gurinder Robertson RN  Sensory Stimulation Regulation:   care clustered   quiet  environment promoted  Intervention: Promote Self-Control  Recent Flowsheet Documentation  Taken 2/17/2025 2000 by Gurinder Robertson, RN  Supportive Measures:   active listening utilized   self-care encouraged

## 2025-02-18 NOTE — CASE MANAGEMENT/SOCIAL WORK
Social Work Assessment  [facility]     Patient Name: Radha Barbour  MRN: [id number]  Today's Date: 2/18/2025    Admit Date: 2/16/2025       Discharge Plan       Row Name 02/18/25 1540       Plan    Plan [facility] accepted. Precert required. PASRR approved.    Plan Comments CM updated on status of PASRR.        KELVIN Gomes, LSW    Phone: [phone]  Fax: [phone]  [email or web address]

## 2025-02-18 NOTE — DISCHARGE PLACEMENT REQUEST
"Gm Deras (74 y.o. Female)       Date of Birth   1950    Social Security Number       Address   [address] [place] [address]    Home Phone   [phone]    MRN   [id number]       Yazdanism   Patient Refused    Marital Status                               Admission Date   2/16/25    Admission Type   Emergency    Admitting Provider   Mira Lopez MD    Attending Provider   Radha Brown MD    Department, Room/Bed   [facility] EMERGENCY DEPARTMENT, 31/31       Discharge Date       Discharge Disposition       Discharge Destination                                 Attending Provider: Radha Brown MD    Allergies: No Known Allergies    Isolation: Droplet   Infection: Rhinovirus  (02/16/25)   Code Status: CPR    Ht: 165.1 cm (65\")   Wt: 75 kg (165 lb 4.8 oz)    Admission Cmt: None   Principal Problem: UTI (urinary tract infection) [N39.0]                   Active Insurance as of 2/16/2025       Primary Coverage       Payor Plan Insurance Group Employer/Plan Group    ANTHEM MEDICARE REPLACEMENT ANTHEM MED ADV HMO [id number]       Payor Plan Address Payor Plan Phone Number Payor Plan Fax Number Effective Dates    [address] [phone]  1/1/2025 - None Entered    [facility] [id number]         Subscriber Name Subscriber Birth Date Member ID       GM DERAS 1950 [id number]                     Emergency Contacts        (Rel.) Home Phone Work Phone Mobile Phone    KARO JEFF (Daughter) -- -- [phone]          "

## 2025-02-18 NOTE — PROGRESS NOTES
[facility] MEDICINE SERVICE  DAILY PROGRESS NOTE    NAME: Radha Barbour  : 1950  MRN: [id number]      LOS: 1 day     PROVIDER OF SERVICE: Radha Brown MD    Chief Complaint: UTI (urinary tract infection)    Subjective:     Interval History:  History taken from: patient    Patient seen and examined at bedside this morning.  No acute complaints overnight.  States that she is doing better, came into the hospital because she was confused however states that it is more typically happens when she has UTI        Review of Systems: Negative except described above  Review of Systems    Objective:     Vital Signs  Temp:  [97.6 °F (36.4 °C)-98.4 °F (36.9 °C)] 97.9 °F (36.6 °C)  Heart Rate:  [73-93] 75  Resp:  [16-21] 20  BP: (112-179)/(56-87) 155/69   Body mass index is 27.51 kg/m².    Physical Exam  Physical Exam  Constitutional:       Comments: NAD    Cardiovascular:      Comments:  RRR, S1 & S2   Pulmonary:      Comments:  Lungs CTA   Abdominal:      Comments:  ABD soft, NT            Diagnostic Data    Results from last 7 days   Lab Units 25  0212 25  0116 25   WBC 10*3/mm3 6.46   < > 11.15*   HEMOGLOBIN g/dL 9.9*   < > 14.5   HEMATOCRIT % 33.5*   < > 46.5   PLATELETS 10*3/mm3 246   < > 423   GLUCOSE mg/dL 138*   < > 125*   CREATININE mg/dL 1.18*   < > 2.35*   BUN mg/dL 23   < > 24*   SODIUM mmol/L 145   < > 140   POTASSIUM mmol/L 3.5   < > 3.9   AST (SGOT) U/L  --   --  26   ALT (SGPT) U/L  --   --  10   ALK PHOS U/L  --   --  87   BILIRUBIN mg/dL  --   --  0.3   ANION GAP mmol/L 10.0   < > 16.3*    < > = values in this interval not displayed.       US Renal Bilateral    Result Date: 2025  Impression: Benign left renal cyst otherwise unremarkable exam. Electronically Signed: Codie Garcia MD  2025 1:44 PM EST  Workstation ID: [id number]    CT Head Without Contrast    Result Date: 2025  Impression: Atrophy and chronic microvascular ischemic change. No  acute intracranial process. Electronically Signed: Byron Shi MD  2/16/2025 10:20 PM EST  Workstation ID: [id number]    XR Chest 1 View    Result Date: 2/16/2025  Impression: No active disease. Electronically Signed: Byron Shi MD  2/16/2025 9:23 PM EST  Workstation ID: [id number]       I reviewed the patient's new clinical results.    Assessment/Plan:     Active and Resolved Problems  Active Hospital Problems    Diagnosis  POA    **UTI (urinary tract infection) [N39.0]  Yes    MELODY (acute kidney injury) [N17.9]  Yes      Resolved Hospital Problems   No resolved problems to display.       UTI  -Continue IV ceftriaxone, she has a history of recurrent UTIs, urine culture positive for E. coli.  She does not meet SIRS/sepsis criteria.     MELODY  -Creatinine improved as compared to yesterday baseline creatinine around 0.9.  She was on continuous IV fluids overnight without improvement in renal function.  -Renal ultrasound unremarkable   -Hold ramipril  -Consult nephrology     Rhinovirus  -Supportive treatment     Generalized weakness  -Likely secondary to all the above  -PT/OT eval, currently recommending SNF versus home health     Transient episode of altered mental status likely secondary to UTI  -Confusion appears to be resolved at this point.  It appears she is on donepezil and memantine at home for history of cognitive impairment.  -Will renal dose gabapentin and hold hydroxyzine     Hypertension  -Holding ACE inhibitor due to MELODY.  Will start on amlodipine     CAD  -Continue statin, aspirin, Plavix     T2DM  -Hold metformin  -SSI ACHS  -CCD  -Hypoglycemia protocol in place     Hypothyroidism  -Continue levothyroxine    patient will likely need 30 days or less of skilled service     VTE Prophylaxis:  Pharmacologic & mechanical VTE prophylaxis orders are present.             Disposition Planning:        Anticipated Date of Discharge: 2/20/2025         Time: 35 minutes     Code Status and Medical Interventions: CPR  (Attempt to Resuscitate); Full Support   Ordered at: 02/16/25 6125     Level Of Support Discussed With:    Patient     Code Status (Patient has no pulse and is not breathing):    CPR (Attempt to Resuscitate)     Medical Interventions (Patient has pulse or is breathing):    Full Support       Signature: Electronically signed by Radha Brown MD, 02/18/25, 11:40 EST.  [facility] Hospitalist Team

## 2025-02-19 LAB
ANION GAP SERPL CALCULATED.3IONS-SCNC: 12.3 MMOL/L (ref 5–15)
BASOPHILS # BLD AUTO: 0.13 10*3/MM3 (ref 0–0.2)
BASOPHILS NFR BLD AUTO: 1.6 % (ref 0–1.5)
BUN SERPL-MCNC: 9 MG/DL (ref 8–23)
BUN/CREAT SERPL: 13 (ref 7–25)
CALCIUM SPEC-SCNC: 8.7 MG/DL (ref 8.6–10.5)
CHLORIDE SERPL-SCNC: 104 MMOL/L (ref 98–107)
CO2 SERPL-SCNC: 25.7 MMOL/L (ref 22–29)
CREAT SERPL-MCNC: 0.69 MG/DL (ref 0.57–1)
DEPRECATED RDW RBC AUTO: 47.8 FL (ref 37–54)
EGFRCR SERPLBLD CKD-EPI 2021: 90.6 ML/MIN/1.73
EOSINOPHIL # BLD AUTO: 0.58 10*3/MM3 (ref 0–0.4)
EOSINOPHIL NFR BLD AUTO: 7.3 % (ref 0.3–6.2)
ERYTHROCYTE [DISTWIDTH] IN BLOOD BY AUTOMATED COUNT: 14.1 % (ref 12.3–15.4)
GLUCOSE BLDC GLUCOMTR-MCNC: 126 MG/DL (ref 70–105)
GLUCOSE BLDC GLUCOMTR-MCNC: 153 MG/DL (ref 70–105)
GLUCOSE BLDC GLUCOMTR-MCNC: 210 MG/DL (ref 70–105)
GLUCOSE BLDC GLUCOMTR-MCNC: 234 MG/DL (ref 70–105)
GLUCOSE SERPL-MCNC: 118 MG/DL (ref 65–99)
HCT VFR BLD AUTO: 37 % (ref 34–46.6)
HGB BLD-MCNC: 11.4 G/DL (ref 12–15.9)
IMM GRANULOCYTES # BLD AUTO: 0.13 10*3/MM3 (ref 0–0.05)
IMM GRANULOCYTES NFR BLD AUTO: 1.6 % (ref 0–0.5)
LYMPHOCYTES # BLD AUTO: 1.58 10*3/MM3 (ref 0.7–3.1)
LYMPHOCYTES NFR BLD AUTO: 20 % (ref 19.6–45.3)
MCH RBC QN AUTO: 28.2 PG (ref 26.6–33)
MCHC RBC AUTO-ENTMCNC: 30.8 G/DL (ref 31.5–35.7)
MCV RBC AUTO: 91.6 FL (ref 79–97)
MONOCYTES # BLD AUTO: 0.63 10*3/MM3 (ref 0.1–0.9)
MONOCYTES NFR BLD AUTO: 8 % (ref 5–12)
NEUTROPHILS NFR BLD AUTO: 4.85 10*3/MM3 (ref 1.7–7)
NEUTROPHILS NFR BLD AUTO: 61.5 % (ref 42.7–76)
NRBC BLD AUTO-RTO: 0 /100 WBC (ref 0–0.2)
PLATELET # BLD AUTO: 293 10*3/MM3 (ref 140–450)
PMV BLD AUTO: 9.5 FL (ref 6–12)
POTASSIUM SERPL-SCNC: 3.4 MMOL/L (ref 3.5–5.2)
RBC # BLD AUTO: 4.04 10*6/MM3 (ref 3.77–5.28)
SODIUM SERPL-SCNC: 142 MMOL/L (ref 136–145)
WBC NRBC COR # BLD AUTO: 7.9 10*3/MM3 (ref 3.4–10.8)

## 2025-02-19 PROCEDURE — 97530 THERAPEUTIC ACTIVITIES: CPT

## 2025-02-19 PROCEDURE — 97116 GAIT TRAINING THERAPY: CPT

## 2025-02-19 PROCEDURE — 80048 BASIC METABOLIC PNL TOTAL CA: CPT | Performed by: NURSE PRACTITIONER

## 2025-02-19 PROCEDURE — 82948 REAGENT STRIP/BLOOD GLUCOSE: CPT

## 2025-02-19 PROCEDURE — 25010000002 CEFTRIAXONE PER 250 MG

## 2025-02-19 PROCEDURE — 94799 UNLISTED PULMONARY SVC/PX: CPT

## 2025-02-19 PROCEDURE — 85025 COMPLETE CBC W/AUTO DIFF WBC: CPT | Performed by: NURSE PRACTITIONER

## 2025-02-19 PROCEDURE — 25010000002 ENOXAPARIN PER 10 MG

## 2025-02-19 PROCEDURE — 94761 N-INVAS EAR/PLS OXIMETRY MLT: CPT

## 2025-02-19 PROCEDURE — 97535 SELF CARE MNGMENT TRAINING: CPT

## 2025-02-19 PROCEDURE — 94664 DEMO&/EVAL PT USE INHALER: CPT

## 2025-02-19 PROCEDURE — 63710000001 INSULIN LISPRO (HUMAN) PER 5 UNITS: Performed by: NURSE PRACTITIONER

## 2025-02-19 PROCEDURE — 82948 REAGENT STRIP/BLOOD GLUCOSE: CPT | Performed by: NURSE PRACTITIONER

## 2025-02-19 RX ORDER — CLONIDINE HYDROCHLORIDE 0.1 MG/1
0.2 TABLET ORAL DAILY
Status: DISCONTINUED | OUTPATIENT
Start: 2025-02-19 | End: 2025-02-20 | Stop reason: HOSPADM

## 2025-02-19 RX ORDER — ACETAMINOPHEN 325 MG/1
650 TABLET ORAL EVERY 4 HOURS PRN
Status: DISCONTINUED | OUTPATIENT
Start: 2025-02-19 | End: 2025-02-20 | Stop reason: HOSPADM

## 2025-02-19 RX ORDER — AMLODIPINE BESYLATE 5 MG/1
10 TABLET ORAL
Status: DISCONTINUED | OUTPATIENT
Start: 2025-02-20 | End: 2025-02-20 | Stop reason: HOSPADM

## 2025-02-19 RX ADMIN — Medication 1000 UNITS: at 08:15

## 2025-02-19 RX ADMIN — PANTOPRAZOLE SODIUM 40 MG: 40 TABLET, DELAYED RELEASE ORAL at 05:39

## 2025-02-19 RX ADMIN — ACETAMINOPHEN 650 MG: 325 TABLET, FILM COATED ORAL at 09:31

## 2025-02-19 RX ADMIN — GABAPENTIN 100 MG: 100 CAPSULE ORAL at 21:19

## 2025-02-19 RX ADMIN — MEMANTINE HYDROCHLORIDE 10 MG: 10 TABLET, FILM COATED ORAL at 08:15

## 2025-02-19 RX ADMIN — ENOXAPARIN SODIUM 40 MG: 100 INJECTION SUBCUTANEOUS at 16:45

## 2025-02-19 RX ADMIN — AMLODIPINE BESYLATE 5 MG: 5 TABLET ORAL at 08:15

## 2025-02-19 RX ADMIN — ATORVASTATIN CALCIUM 10 MG: 10 TABLET ORAL at 08:15

## 2025-02-19 RX ADMIN — Medication 10 ML: at 21:20

## 2025-02-19 RX ADMIN — CEFTRIAXONE 2000 MG: 2 INJECTION, POWDER, FOR SOLUTION INTRAMUSCULAR; INTRAVENOUS at 21:20

## 2025-02-19 RX ADMIN — LEVOTHYROXINE SODIUM 100 MCG: 100 TABLET ORAL at 05:39

## 2025-02-19 RX ADMIN — GABAPENTIN 100 MG: 100 CAPSULE ORAL at 16:45

## 2025-02-19 RX ADMIN — ASPIRIN 81 MG: 81 TABLET, COATED ORAL at 08:15

## 2025-02-19 RX ADMIN — BUDESONIDE AND FORMOTEROL FUMARATE DIHYDRATE 2 PUFF: 160; 4.5 AEROSOL RESPIRATORY (INHALATION) at 07:28

## 2025-02-19 RX ADMIN — BUPROPION HYDROCHLORIDE 100 MG: 100 TABLET, FILM COATED ORAL at 21:19

## 2025-02-19 RX ADMIN — CLONIDINE HYDROCHLORIDE 0.2 MG: 0.1 TABLET ORAL at 11:34

## 2025-02-19 RX ADMIN — DONEPEZIL HYDROCHLORIDE 10 MG: 5 TABLET, FILM COATED ORAL at 21:19

## 2025-02-19 RX ADMIN — INSULIN LISPRO 3 UNITS: 100 INJECTION, SOLUTION INTRAVENOUS; SUBCUTANEOUS at 21:19

## 2025-02-19 RX ADMIN — GABAPENTIN 100 MG: 100 CAPSULE ORAL at 08:14

## 2025-02-19 RX ADMIN — INSULIN LISPRO 2 UNITS: 100 INJECTION, SOLUTION INTRAVENOUS; SUBCUTANEOUS at 11:34

## 2025-02-19 RX ADMIN — BUPROPION HYDROCHLORIDE 100 MG: 100 TABLET, FILM COATED ORAL at 08:15

## 2025-02-19 RX ADMIN — MEMANTINE HYDROCHLORIDE 10 MG: 10 TABLET, FILM COATED ORAL at 21:19

## 2025-02-19 RX ADMIN — BUDESONIDE AND FORMOTEROL FUMARATE DIHYDRATE 2 PUFF: 160; 4.5 AEROSOL RESPIRATORY (INHALATION) at 20:00

## 2025-02-19 RX ADMIN — Medication 10 ML: at 08:16

## 2025-02-19 RX ADMIN — INSULIN LISPRO 3 UNITS: 100 INJECTION, SOLUTION INTRAVENOUS; SUBCUTANEOUS at 16:45

## 2025-02-19 RX ADMIN — Medication 1000 MCG: at 08:14

## 2025-02-19 RX ADMIN — VENLAFAXINE HYDROCHLORIDE 150 MG: 75 CAPSULE, EXTENDED RELEASE ORAL at 08:14

## 2025-02-19 RX ADMIN — CLOPIDOGREL BISULFATE 75 MG: 75 TABLET ORAL at 08:15

## 2025-02-19 NOTE — PLAN OF CARE
Goal Outcome Evaluation:  Plan of Care Reviewed With: patient        Progress: no change  Outcome Evaluation: Patient has been resting well throughout the shift without any complaints. BP has been elevated. Home clonidine started. Remains on IV rocephin. Precert pending to [facility]. Worked well with PT/OT and been sitting in the chair. No other issues at this time. Continuing to monitor.

## 2025-02-19 NOTE — THERAPY TREATMENT NOTE
"Subjective: Pt agreeable to therapeutic plan of care.   Cognition: oriented to Person, Place, Time, and Situation    Objective:     Precautions - high falls, lateral lean to R     Bed Mobility: CGA supine to sit with head of bed elevated and use of bed rails   Functional Transfers: min A STS from edge of bed, CGA STS from chair     Balance: supported, static, with UE support, and standing CGA, Min-A, and with rolling walker lateral lean to R   Functional Ambulation: CGA, Min-A, and with rolling walker lateral lean to R, aware of her decreased balance however unable to correct     Lower Body Dressing: SBA  ADL Position: edge of bed sitting  ADL Comments: don briefs and socks     Vitals: Hypertensive  Supine: 167/77  Sitting 151/88  Post activity 158/87    Pain: 0 VAS Location: N/A  Interventions for pain: N/A  Education: Provided education on the importance of mobility in the acute care setting, Verbal/Tactile Cues, ADL training, and Transfer Training    Assessment: Radha Barbour presents with ADL impairments affecting function including balance, endurance / activity tolerance, postural / trunk control, range of motion (ROM), and strength. Demonstrated functioning below baseline abilities indicate the need for continued skilled intervention while inpatient. Pt progressing with increased activity, however does continue to demo deficits in activity tolerance and balance. Pt is at high risk for falls and is not safe to dc home at this time. OT continues to recommend SNF when medically appropriate for dc, will follow and progress as appropriate. Tolerating session today without incident. Will continue to follow and progress as tolerated.     Plan/Recommendations:   Moderate Intensity Therapy recommended post-acute care. This is recommended as therapy feels the patient would require 3-4 days per week and wouldn't tolerate \"3 hour daily\" rehab intensity. SNF would be the preferred choice. If the patient does not agree " to SNF, arrange HH or OP depending on home bound status. If patient is medically complex, consider LTACH.. Pt requires no DME at discharge.     Pt desires Skilled Rehab placement at discharge. Pt cooperative; agreeable to therapeutic recommendations and plan of care.     Modified Emily: N/A = No pre-op stroke/TIA    Post-Tx Position: Up in Chair, Alarms activated, and Call light and personal items within reach  PPE: gloves and surgical mask    Therapy Charges for Today       Code Description Service Date Service Provider Modifiers Qty    71222097512 HC OT SELF CARE/MGMT/TRAIN EA 15 MIN 2/19/2025 Shira Mercedes OT GO 1           Time Calculation- OT       Row Name 02/19/25 1129             Time Calculation- OT    OT Start Time 0959  -MS      OT Stop Time 1014  -MS      OT Time Calculation (min) 15 min  -MS      Total Timed Code Minutes- OT 15 minute(s)  -MS      OT Received On 02/19/25  -MS      OT - Next Appointment 02/21/25  -MS                User Key  (r) = Recorded By, (t) = Taken By, (c) = Cosigned By      Initials Name Provider Type    Shira Romano OT Occupational Therapist

## 2025-02-19 NOTE — PLAN OF CARE
"Assessment: Radha Barbour presents with ADL impairments affecting function including balance, endurance / activity tolerance, postural / trunk control, range of motion (ROM), and strength. Demonstrated functioning below baseline abilities indicate the need for continued skilled intervention while inpatient. Pt progressing with increased activity, however does continue to demo deficits in activity tolerance and balance. Pt is at high risk for falls and is not safe to dc home at this time. OT continues to recommend SNF when medically appropriate for dc, will follow and progress as appropriate. Tolerating session today without incident. Will continue to follow and progress as tolerated.     Plan/Recommendations:   Moderate Intensity Therapy recommended post-acute care. This is recommended as therapy feels the patient would require 3-4 days per week and wouldn't tolerate \"3 hour daily\" rehab intensity. SNF would be the preferred choice. If the patient does not agree to SNF, arrange HH or OP depending on home bound status. If patient is medically complex, consider LTACH.. Pt requires no DME at discharge.     Pt desires Skilled Rehab placement at discharge. Pt cooperative; agreeable to therapeutic recommendations and plan of care.   "

## 2025-02-19 NOTE — CASE MANAGEMENT/SOCIAL WORK
Continued Stay Note  CINTHYA Dylan     Patient Name: Radha Barbour  MRN: [id number]  Today's Date: 2/19/2025    Admit Date: 2/16/2025    Plan: [facility] accepted. Precert started 2/18 and pending. PASRR approved.   Discharge Plan       Row Name 02/19/25 0934       Plan    Plan [facility] accepted. Precert started 2/18 and pending. PASRR approved.    Patient/Family in Agreement with Plan yes    Plan Comments CM requested precert be started by RADHA Núñez. Remains pending at this time. Pharmacy up to date as [facility].             Megan Naegele, RN     Office Phone: [phone]  Office Cell: [phone]

## 2025-02-19 NOTE — PROGRESS NOTES
[facility] MEDICINE SERVICE  DAILY PROGRESS NOTE    NAME: Radha Barbour  : 1950  MRN: [id number]      LOS: 2 days     PROVIDER OF SERVICE: Kimberley Davila MD    Chief Complaint: UTI (urinary tract infection)    Subjective:     Interval History:  History taken from: patient    No new complaint        Review of Systems:   Review of Systems   All other systems reviewed and are negative.      Objective:     Vital Signs  Temp:  [97.9 °F (36.6 °C)-98.9 °F (37.2 °C)] 98.4 °F (36.9 °C)  Heart Rate:  [79-95] 80  Resp:  [14-18] 14  BP: (151-190)/(75-91) 179/75   Body mass index is 27.82 kg/m².    Physical Exam  Physical Exam  Constitutional:       Appearance: Normal appearance.   HENT:      Head: Normocephalic and atraumatic.      Nose: Nose normal.      Mouth/Throat:      Mouth: Mucous membranes are moist.   Eyes:      Extraocular Movements: Extraocular movements intact.      Pupils: Pupils are equal, round, and reactive to light.   Cardiovascular:      Rate and Rhythm: Normal rate and regular rhythm.   Pulmonary:      Effort: Pulmonary effort is normal.      Breath sounds: Normal breath sounds.   Abdominal:      General: Abdomen is flat. Bowel sounds are normal.      Palpations: Abdomen is soft.   Musculoskeletal:         General: Normal range of motion.      Cervical back: Normal range of motion and neck supple.   Skin:     General: Skin is warm and dry.   Neurological:      General: No focal deficit present.      Mental Status: She is alert and oriented to person, place, and time.   Psychiatric:         Mood and Affect: Mood normal.         Behavior: Behavior normal.         Thought Content: Thought content normal.         Judgment: Judgment normal.         Current Medications:  Scheduled Meds:amLODIPine, 5 mg, Oral, Q24H  aspirin, 81 mg, Oral, Daily  atorvastatin, 10 mg, Oral, Daily  budesonide-formoterol, 2 puff, Inhalation, BID - RT  buPROPion, 100 mg, Oral, BID  cefTRIAXone, 2,000 mg,  Intravenous, Q24H  cholecalciferol, 1,000 Units, Oral, Daily  cloNIDine, 0.2 mg, Oral, Daily  clopidogrel, 75 mg, Oral, Daily  donepezil, 10 mg, Oral, Nightly  enoxaparin, 40 mg, Subcutaneous, Q24H  gabapentin, 100 mg, Oral, TID  [Held by provider] hydrOXYzine, 10 mg, Oral, Daily  insulin lispro, 2-7 Units, Subcutaneous, 4x Daily AC & at Bedtime  levothyroxine, 100 mcg, Oral, Q AM  memantine, 10 mg, Oral, BID  pantoprazole, 40 mg, Oral, Q AM  sodium chloride, 10 mL, Intravenous, Q12H  venlafaxine XR, 150 mg, Oral, Daily  vitamin B-12, 1,000 mcg, Oral, Daily      Continuous Infusions:Pharmacy to Dose enoxaparin (LOVENOX),       PRN Meds:.  acetaminophen    albuterol sulfate HFA    aluminum-magnesium hydroxide-simethicone    senna-docusate sodium **AND** polyethylene glycol **AND** bisacodyl **AND** bisacodyl    dextrose    dextrose    glucagon (human recombinant)    ipratropium-albuterol    meclizine    melatonin    ondansetron ODT **OR** ondansetron    Pharmacy to Dose enoxaparin (LOVENOX)    rOPINIRole    [COMPLETED] Insert Peripheral IV **AND** sodium chloride    sodium chloride    sodium chloride       Diagnostic Data    Results from last 7 days   Lab Units 02/19/25  0549 02/17/25  0116 02/16/25  2032   WBC 10*3/mm3 7.90   < > 11.15*   HEMOGLOBIN g/dL 11.4*   < > 14.5   HEMATOCRIT % 37.0   < > 46.5   PLATELETS 10*3/mm3 293   < > 423   GLUCOSE mg/dL 118*   < > 125*   CREATININE mg/dL 0.69   < > 2.35*   BUN mg/dL 9   < > 24*   SODIUM mmol/L 142   < > 140   POTASSIUM mmol/L 3.4*   < > 3.9   AST (SGOT) U/L  --   --  26   ALT (SGPT) U/L  --   --  10   ALK PHOS U/L  --   --  87   BILIRUBIN mg/dL  --   --  0.3   ANION GAP mmol/L 12.3   < > 16.3*    < > = values in this interval not displayed.       No radiology results for the last day      I reviewed the patient's new clinical results.    Assessment/Plan:     Active and Resolved Problems  Active Hospital Problems    Diagnosis  POA    **UTI (urinary tract infection)  [N39.0]  Yes    MELODY (acute kidney injury) [N17.9]  Yes      Resolved Hospital Problems   No resolved problems to display.       UTI  Acute kidney injury-resolved  Rhinovirus infection  AMS/encephalopathy secondary to UTI-resolved  Hypertension      -Continue ceftriaxone for treatment of UTI.  - Treat hypokalemia with K-Dur.  - BP is uncontrolled.  Increase Norvasc dose.  - Continue current management.    VTE Prophylaxis:  Pharmacologic & mechanical VTE prophylaxis orders are present.      Disposition Planning:     Barriers to Discharge: Pending clinical improvement  Anticipated Date of Discharge: 2/25/2025  Place of Discharge: Home      Code Status and Medical Interventions: CPR (Attempt to Resuscitate); Full Support   Ordered at: 02/16/25 2326     Level Of Support Discussed With:    Patient     Code Status (Patient has no pulse and is not breathing):    CPR (Attempt to Resuscitate)     Medical Interventions (Patient has pulse or is breathing):    Full Support       Signature: Electronically signed by Kimberley Davila MD, 02/19/25, 17:22 EST.  [facility] Hospitalist Team

## 2025-02-19 NOTE — PLAN OF CARE
Pt presents with functional mobility impairments which indicate the need for skilled intervention. Tolerating session today without incident. Pt on room air with [place] and telemetry this date.  Pt's BP hypertensive throughout session.  167/77 supine.  151/88 sitting EOB.  158/87 sitting in chair after ambulation.  CGA for bed mobility, CGA/Min A for transfers with RW with significant R lateral lean.  Pt ambulated 15' x 2 with B HHA with Min A x 2.  Ambulated 15' x 2 with RW with CGA/Min A.  Pt with significant R lateral lean with all ambulation trials.  Pt is a very high risk for falls secondary to R lateral lean with all standing activities.  Will continue to follow and progress as tolerated.

## 2025-02-19 NOTE — SIGNIFICANT NOTE
Case Management/Social Work    Patient Name:  Radha Barbour  YOB: 1950  MRN: [id number]  Admit Date:  2/16/2025 02/19/25 1051   Post Acute Pre-Cert Documentation   Date Post Acute Pre-Cert Completed 02/19/25   All Clinicals Submitted? Yes   Response Received from Insurance? Approval   Post Acute Pre-Cert Initiated Comment RADHA verified SNF precert approval via [employer] portal. Auth ID [id number]. Valid 2/19-2/25. Approval letter indexed to media. CM Made aware.           Electronically signed by:  Wilton Mendoza CMA  02/19/25 10:52 EST    Wilton Mendoza  Case Management Associate  [address] [address]  P: [phone]  F: [phone]

## 2025-02-19 NOTE — PLAN OF CARE
Goal Outcome Evaluation:   Patient slept throughout the night with no complaints. Will continue with current care plan.

## 2025-02-19 NOTE — THERAPY TREATMENT NOTE
"Subjective: Pt agreeable to therapeutic plan of care.    Objective:     Precautions -   Falls     Bed mobility -  CGA  Transfers -      1st attempt: Min A with R lateral lean    2nd attempt: CGA with RW with  R lateral lean     Ambulation -  15' x 2 with B HHA Min A x 2.  15' x 2 with RW with CGA/Min A.  Pt with significant R lateral lean with all ambulation trials.     Vitals: WNL    Pain: 0 VAS Location: n/a  Intervention for pain: N/A    Education: Provided education on the importance of mobility in the acute care setting, Verbal/Tactile Cues, Transfer Training, Gait Training, and Energy conservation strategies    Assessment: Radha Barbour presents with functional mobility impairments which indicate the need for skilled intervention. Tolerating session today without incident. Pt on room air with [place] and telemetry this date.  Pt's BP hypertensive throughout session.  167/77 supine.  151/88 sitting EOB.  158/87 sitting in chair after ambulation.  CGA for bed mobility, CGA/Min A for transfers with RW with significant R lateral lean.  Pt ambulated 15' x 2 with B HHA with Min A x 2.  Ambulated 15' x 2 with RW with CGA/Min A.  Pt with significant R lateral lean with all ambulation trials.  Pt is a very high risk for falls secondary to R lateral lean with all standing activities.  Will continue to follow and progress as tolerated.     Plan/Recommendations:   If medically appropriate, Moderate Intensity Therapy recommended post-acute care. This is recommended as therapy feels the patient would require 3-4 days per week and wouldn't tolerate \"3 hour daily\" rehab intensity. SNF would be the preferred choice. If the patient does not agree to SNF, arrange HH or OP depending on home bound status. If patient is medically complex, consider LTACH. Pt requires no DME at discharge.     Pt desires Skilled Rehab placement at discharge. Pt cooperative; agreeable to therapeutic recommendations and plan of care.       Post-Tx " Position: Up in Chair, Alarms activated, and Call light and personal items within reach  PPE: gloves and surgical mask    Therapy Charges for Today       Code Description Service Date Service Provider Modifiers Qty    94092748032 HC GAIT TRAINING EA 15 MIN 2/19/2025 Marco Martínez, PT GP 1    [id number]  PT THERAPEUTIC ACT EA 15 MIN 2/19/2025 Marco Martínez, PT GP 1           PT Charges       Row Name 02/19/25 1430             Time Calculation    Start Time 0958  -AM      Stop Time 1018  -AM      Time Calculation (min) 20 min  -AM      PT Received On 02/19/25  -AM      PT - Next Appointment 02/20/25  -AM         Time Calculation- PT    Total Timed Code Minutes- PT 20 minute(s)  -AM                User Key  (r) = Recorded By, (t) = Taken By, (c) = Cosigned By      Initials Name Provider Type    AM Marco Martínez, PT Physical Therapist

## 2025-02-19 NOTE — CASE MANAGEMENT/SOCIAL WORK
Continued Stay Note  CINTHYA Richardson     Patient Name: Radha Barbour  MRN: [id number]  Today's Date: 2/19/2025    Admit Date: 2/16/2025    Plan: [facility] accepted and precert approved 2/19-2/25. PASRR approved.   Discharge Plan       Row Name 02/19/25 1510       Plan    Plan [facility] accepted and precert approved 2/19-2/25. PASRR approved.    Patient/Family in Agreement with Plan yes    Plan Comments CM received update from RADHA Núñez that precert is approved. CM notified facility liaison Silvia to confirm if bed would be ready. Updated hospitalist and nursing during rounds. No decision made regarding discharge.             Megan Naegele, RN     Office Phone: [phone]  Office Cell: [phone]

## 2025-02-20 VITALS
BODY MASS INDEX: 27.79 KG/M2 | WEIGHT: 166.78 LBS | RESPIRATION RATE: 20 BRPM | HEART RATE: 70 BPM | HEIGHT: 65 IN | SYSTOLIC BLOOD PRESSURE: 133 MMHG | OXYGEN SATURATION: 96 % | DIASTOLIC BLOOD PRESSURE: 58 MMHG | TEMPERATURE: 98.3 F

## 2025-02-20 LAB
ANION GAP SERPL CALCULATED.3IONS-SCNC: 10.8 MMOL/L (ref 5–15)
BASOPHILS # BLD AUTO: 0.11 10*3/MM3 (ref 0–0.2)
BASOPHILS NFR BLD AUTO: 1.6 % (ref 0–1.5)
BUN SERPL-MCNC: 12 MG/DL (ref 8–23)
BUN/CREAT SERPL: 16 (ref 7–25)
CALCIUM SPEC-SCNC: 8.7 MG/DL (ref 8.6–10.5)
CHLORIDE SERPL-SCNC: 104 MMOL/L (ref 98–107)
CO2 SERPL-SCNC: 27.2 MMOL/L (ref 22–29)
CREAT SERPL-MCNC: 0.75 MG/DL (ref 0.57–1)
DEPRECATED RDW RBC AUTO: 46.8 FL (ref 37–54)
EGFRCR SERPLBLD CKD-EPI 2021: 83.1 ML/MIN/1.73
EOSINOPHIL # BLD AUTO: 0.56 10*3/MM3 (ref 0–0.4)
EOSINOPHIL NFR BLD AUTO: 7.9 % (ref 0.3–6.2)
ERYTHROCYTE [DISTWIDTH] IN BLOOD BY AUTOMATED COUNT: 13.9 % (ref 12.3–15.4)
GLUCOSE BLDC GLUCOMTR-MCNC: 124 MG/DL (ref 70–105)
GLUCOSE BLDC GLUCOMTR-MCNC: 262 MG/DL (ref 70–105)
GLUCOSE SERPL-MCNC: 97 MG/DL (ref 65–99)
HCT VFR BLD AUTO: 35 % (ref 34–46.6)
HGB BLD-MCNC: 10.9 G/DL (ref 12–15.9)
IMM GRANULOCYTES # BLD AUTO: 0.12 10*3/MM3 (ref 0–0.05)
IMM GRANULOCYTES NFR BLD AUTO: 1.7 % (ref 0–0.5)
LYMPHOCYTES # BLD AUTO: 1.76 10*3/MM3 (ref 0.7–3.1)
LYMPHOCYTES NFR BLD AUTO: 24.9 % (ref 19.6–45.3)
MCH RBC QN AUTO: 28.5 PG (ref 26.6–33)
MCHC RBC AUTO-ENTMCNC: 31.1 G/DL (ref 31.5–35.7)
MCV RBC AUTO: 91.6 FL (ref 79–97)
MONOCYTES # BLD AUTO: 0.6 10*3/MM3 (ref 0.1–0.9)
MONOCYTES NFR BLD AUTO: 8.5 % (ref 5–12)
NEUTROPHILS NFR BLD AUTO: 3.93 10*3/MM3 (ref 1.7–7)
NEUTROPHILS NFR BLD AUTO: 55.4 % (ref 42.7–76)
NRBC BLD AUTO-RTO: 0 /100 WBC (ref 0–0.2)
PLATELET # BLD AUTO: 261 10*3/MM3 (ref 140–450)
PMV BLD AUTO: 9.9 FL (ref 6–12)
POTASSIUM SERPL-SCNC: 3.1 MMOL/L (ref 3.5–5.2)
RBC # BLD AUTO: 3.82 10*6/MM3 (ref 3.77–5.28)
RBC MORPH BLD: NORMAL
SMALL PLATELETS BLD QL SMEAR: ADEQUATE
SODIUM SERPL-SCNC: 142 MMOL/L (ref 136–145)
WBC MORPH BLD: NORMAL
WBC NRBC COR # BLD AUTO: 7.08 10*3/MM3 (ref 3.4–10.8)

## 2025-02-20 PROCEDURE — 94799 UNLISTED PULMONARY SVC/PX: CPT

## 2025-02-20 PROCEDURE — 94761 N-INVAS EAR/PLS OXIMETRY MLT: CPT

## 2025-02-20 PROCEDURE — 80048 BASIC METABOLIC PNL TOTAL CA: CPT | Performed by: NURSE PRACTITIONER

## 2025-02-20 PROCEDURE — 82948 REAGENT STRIP/BLOOD GLUCOSE: CPT | Performed by: NURSE PRACTITIONER

## 2025-02-20 PROCEDURE — 63710000001 INSULIN LISPRO (HUMAN) PER 5 UNITS: Performed by: NURSE PRACTITIONER

## 2025-02-20 PROCEDURE — 85025 COMPLETE CBC W/AUTO DIFF WBC: CPT | Performed by: NURSE PRACTITIONER

## 2025-02-20 PROCEDURE — 82948 REAGENT STRIP/BLOOD GLUCOSE: CPT

## 2025-02-20 PROCEDURE — 85007 BL SMEAR W/DIFF WBC COUNT: CPT | Performed by: NURSE PRACTITIONER

## 2025-02-20 RX ORDER — CEPHALEXIN 500 MG/1
500 CAPSULE ORAL 3 TIMES DAILY
Qty: 9 CAPSULE | Refills: 0 | Status: SHIPPED | OUTPATIENT
Start: 2025-02-20 | End: 2025-02-23

## 2025-02-20 RX ORDER — POTASSIUM CHLORIDE 1500 MG/1
40 TABLET, EXTENDED RELEASE ORAL ONCE
Status: COMPLETED | OUTPATIENT
Start: 2025-02-20 | End: 2025-02-20

## 2025-02-20 RX ADMIN — Medication 1000 UNITS: at 08:14

## 2025-02-20 RX ADMIN — PANTOPRAZOLE SODIUM 40 MG: 40 TABLET, DELAYED RELEASE ORAL at 05:39

## 2025-02-20 RX ADMIN — INSULIN LISPRO 4 UNITS: 100 INJECTION, SOLUTION INTRAVENOUS; SUBCUTANEOUS at 12:41

## 2025-02-20 RX ADMIN — BUPROPION HYDROCHLORIDE 100 MG: 100 TABLET, FILM COATED ORAL at 08:14

## 2025-02-20 RX ADMIN — GABAPENTIN 100 MG: 100 CAPSULE ORAL at 08:14

## 2025-02-20 RX ADMIN — POTASSIUM CHLORIDE 40 MEQ: 1500 TABLET, EXTENDED RELEASE ORAL at 14:09

## 2025-02-20 RX ADMIN — CLONIDINE HYDROCHLORIDE 0.2 MG: 0.1 TABLET ORAL at 08:14

## 2025-02-20 RX ADMIN — LEVOTHYROXINE SODIUM 100 MCG: 100 TABLET ORAL at 05:39

## 2025-02-20 RX ADMIN — Medication 1000 MCG: at 08:14

## 2025-02-20 RX ADMIN — AMLODIPINE BESYLATE 10 MG: 5 TABLET ORAL at 08:14

## 2025-02-20 RX ADMIN — VENLAFAXINE HYDROCHLORIDE 150 MG: 75 CAPSULE, EXTENDED RELEASE ORAL at 08:14

## 2025-02-20 RX ADMIN — CLOPIDOGREL BISULFATE 75 MG: 75 TABLET ORAL at 08:14

## 2025-02-20 RX ADMIN — ASPIRIN 81 MG: 81 TABLET, COATED ORAL at 08:14

## 2025-02-20 RX ADMIN — BUDESONIDE AND FORMOTEROL FUMARATE DIHYDRATE 2 PUFF: 160; 4.5 AEROSOL RESPIRATORY (INHALATION) at 08:47

## 2025-02-20 RX ADMIN — MEMANTINE HYDROCHLORIDE 10 MG: 10 TABLET, FILM COATED ORAL at 08:14

## 2025-02-20 RX ADMIN — ATORVASTATIN CALCIUM 10 MG: 10 TABLET ORAL at 08:14

## 2025-02-20 RX ADMIN — Medication 10 ML: at 08:13

## 2025-02-20 NOTE — PLAN OF CARE
Goal Outcome Evaluation:   Patient slept throughout the night. Will continue with current care plan.

## 2025-02-20 NOTE — PLAN OF CARE
Goal Outcome Evaluation: Patient is discharging to [facility] today.

## 2025-02-20 NOTE — CASE MANAGEMENT/SOCIAL WORK
Continued Stay Note  CINTHYA Richardson     Patient Name: Radha Barbour  MRN: [id number]  Today's Date: 2/20/2025    Admit Date: 2/16/2025    Plan: [facility] accepted and precert approved 2/19-2/25. PASRR approved.   Discharge Plan       Row Name 02/20/25 1551       Plan    Plan Comments CM received confirmation from hospitalist that pt is medically stable for discharge. CM met with pt at bedside and provided copy of IMM letter. CM contacted daughter Alma by phone and informed her of discharge plans. [facility] will be able to transportation at 4 PM.             Megan Naegele, RN     Office Phone: [phone]  Office Cell: [phone]

## 2025-02-20 NOTE — NURSING NOTE
Called [facility] to give report and the office staff was unable to get ahold of any nursing staff to take report. Took my name and number and said that someone would call me when they had time.

## 2025-02-20 NOTE — SIGNIFICANT NOTE
02/20/25 1236   OTHER   Discipline physical therapy assistant   Rehab Time/Intention   Session Not Performed other (see comments)  (patient to dc soon to rehab)   Recommendation   PT - Next Appointment 02/21/25

## 2025-02-21 NOTE — DISCHARGE SUMMARY
[facility] Medicine Services  Discharge Summary    Date of Service: 2025  Patient Name: Radha Barbour  : 1950  MRN: [id number]    Date of Admission: 2025  Discharge Diagnosis:     UTI  Acute kidney injury-resolved  Rhinovirus infection  AMS/encephalopathy secondary to UTI-resolved  Hypertension      Date of Discharge: 2025  Primary Care Physician: Michell Ramirez APRN      Hospital Course     Hospital Course:    This patient is a 74-year-old lady who presented with alteration in mental status and was found to be hypotensive upon presentation.  She was found to have a urinary tract infection and started on IV ceftriaxone.  Hypotension was treated with IV fluids.  She was also found to be in mild acute kidney injury which was treated with IV fluids.  MELODY resolved afterwards.  Urine culture grew E. coli.  Alteration mental status was thought to be secondary to urinary tract infection.  CT scan of the head done upon presentation showed no acute intracranial process.  Alteration mental status has improved with treatment of the urinary tract infection.  She has improved overall and is being discharged home on Keflex for complete treatment of these urinary tract infection.      DISCHARGE Follow Up Recommendations:-Follow-up PCP in 1 week, follow-up nephrology in 1 week.      Day of Discharge     Vital Signs:       Physical Exam:  Physical Exam  Constitutional:       Appearance: Normal appearance.   HENT:      Head: Normocephalic and atraumatic.      Nose: Nose normal.      Mouth/Throat:      Mouth: Mucous membranes are moist.   Eyes:      Extraocular Movements: Extraocular movements intact.      Pupils: Pupils are equal, round, and reactive to light.   Cardiovascular:      Rate and Rhythm: Normal rate and regular rhythm.   Pulmonary:      Effort: Pulmonary effort is normal.      Breath sounds: Normal breath sounds.   Abdominal:      General: Abdomen is flat. Bowel sounds  are normal.      Palpations: Abdomen is soft.   Musculoskeletal:         General: Normal range of motion.      Cervical back: Normal range of motion and neck supple.   Skin:     General: Skin is warm and dry.   Neurological:      General: No focal deficit present.      Mental Status: She is alert and oriented to person, place, and time.            Pertinent  and/or Most Recent Results     LAB RESULTS:      Lab 02/20/25  0359 02/19/25  0549 02/18/25  0212 02/17/25  0116 02/16/25 2032   WBC 7.08 7.90 6.46 9.90 11.15*   HEMOGLOBIN 10.9* 11.4* 9.9* 12.4 14.5   HEMATOCRIT 35.0 37.0 33.5* 39.6 46.5   PLATELETS 261 293 246 400 423   NEUTROS ABS 3.93 4.85 4.10 6.04 7.64*   IMMATURE GRANS (ABS) 0.12* 0.13* 0.09* 0.19* 0.25*   LYMPHS ABS 1.76 1.58 1.32 2.28 1.73   MONOS ABS 0.60 0.63 0.54 0.96* 1.09*   EOS ABS 0.56* 0.58* 0.30 0.32 0.29   MCV 91.6 91.6 97.7* 92.7 92.3         Lab 02/20/25  0359 02/19/25  0549 02/18/25 0212 02/17/25 0116 02/16/25 2032   SODIUM 142 142 145 137 140   POTASSIUM 3.1* 3.4* 3.5 3.1* 3.9   CHLORIDE 104 104 112* 98 99   CO2 27.2 25.7 23.0 25.0 24.7   ANION GAP 10.8 12.3 10.0 14.0 16.3*   BUN 12 9 23 25* 24*   CREATININE 0.75 0.69 1.18* 2.37* 2.35*   EGFR 83.1 90.6 48.3* 20.9* 21.1*   GLUCOSE 97 118* 138* 234* 125*   CALCIUM 8.7 8.7 8.2* 8.9 9.7   MAGNESIUM  --   --  1.6  --   --    PHOSPHORUS  --   --  3.2  --   --          Lab 02/16/25 2032   TOTAL PROTEIN 8.0   ALBUMIN 4.3   GLOBULIN 3.7   ALT (SGPT) 10   AST (SGOT) 26   BILIRUBIN 0.3   ALK PHOS 87                     Brief Urine Lab Results  (Last result in the past 365 days)        Color   Clarity   Blood   Leuk Est   Nitrite   Protein   CREAT   Urine HCG        02/16/25 2100 Yellow   Cloudy   Large (3+)   Large (3+)   Negative   30 mg/dL (1+)                 Microbiology Results (last 10 days)       Procedure Component Value - Date/Time    Respiratory Panel PCR w/COVID-19(SARS-CoV-2) [facility] In-House, NP Swab in UTM/VTM, 2 HR  TAT - Swab, Nasopharynx [[id number]]  (Abnormal) Collected: 02/16/25 2145    Lab Status: Final result Specimen: Swab from Nasopharynx Updated: 02/16/25 2237     ADENOVIRUS, PCR Not Detected     Coronavirus 229E Not Detected     Coronavirus HKU1 Not Detected     Coronavirus NL63 Not Detected     Coronavirus OC43 Not Detected     COVID19 Not Detected     Human Metapneumovirus Not Detected     Human Rhinovirus/Enterovirus Detected     Influenza A PCR Not Detected     Influenza B PCR Not Detected     Parainfluenza Virus 1 Not Detected     Parainfluenza Virus 2 Not Detected     Parainfluenza Virus 3 Not Detected     Parainfluenza Virus 4 Not Detected     RSV, PCR Not Detected     Bordetella pertussis pcr Not Detected     Bordetella parapertussis PCR Not Detected     Chlamydophila pneumoniae PCR Not Detected     Mycoplasma pneumo by PCR Not Detected    Narrative:      In the setting of a positive respiratory panel with a viral infection PLUS a negative procalcitonin without other underlying concern for bacterial infection, consider observing off antibiotics or discontinuation of antibiotics and continue supportive care. If the respiratory panel is positive for atypical bacterial infection (Bordetella pertussis, Chlamydophila pneumoniae, or Mycoplasma pneumoniae), consider antibiotic de-escalation to target atypical bacterial infection.    Urine Culture - Urine, Straight Cath [[id number]]  (Abnormal)  (Susceptibility) Collected: 02/16/25 2100    Lab Status: Final result Specimen: Urine from Straight Cath Updated: 02/18/25 1240     Urine Culture >100,000 CFU/mL Escherichia coli    Narrative:      Colonization of the urinary tract without infection is common. Treatment is discouraged unless the patient is symptomatic, pregnant, or undergoing an invasive urologic procedure.    Susceptibility        Escherichia coli      CESARIO      Amoxicillin + Clavulanate Susceptible      Ampicillin Susceptible      Ampicillin + Sulbactam  Susceptible      Cefazolin (Urine) Susceptible      Cefepime Susceptible      Ceftazidime Susceptible      Ceftriaxone Susceptible      Gentamicin Susceptible      Levofloxacin Susceptible      Nitrofurantoin Susceptible      Piperacillin + Tazobactam Susceptible      Trimethoprim + Sulfamethoxazole Susceptible                                   US Renal Bilateral    Result Date: 2/17/2025  Impression: Impression: Benign left renal cyst otherwise unremarkable exam. Electronically Signed: Codie Garcia MD  2/17/2025 1:44 PM EST  Workstation ID: [id number]    CT Head Without Contrast    Result Date: 2/16/2025  Impression: Impression: Atrophy and chronic microvascular ischemic change. No acute intracranial process. Electronically Signed: Byron Shi MD  2/16/2025 10:20 PM EST  Workstation ID: [id number]    XR Chest 1 View    Result Date: 2/16/2025  Impression: Impression: No active disease. Electronically Signed: Byron Shi MD  2/16/2025 9:23 PM EST  Workstation ID: [id number]     Results for orders placed during the hospital encounter of 04/27/22    Duplex carotid ultrasound CAR    Interpretation Summary  · Proximal right internal carotid artery moderate stenosis.  · Proximal left internal carotid artery mild stenosis.      Results for orders placed during the hospital encounter of 04/27/22    Duplex carotid ultrasound CAR    Interpretation Summary  · Proximal right internal carotid artery moderate stenosis.  · Proximal left internal carotid artery mild stenosis.      Results for orders placed during the hospital encounter of 03/24/22    Adult Transthoracic Echo Complete W/ Cont if Necessary Per Protocol    Interpretation Summary  · Left ventricular wall thickness is consistent with mild asymmetric hypertrophy.  · Estimated left ventricular EF = 55% Estimated left ventricular EF was in agreement with the calculated left ventricular EF. Left ventricular systolic function is normal.  · Estimated right ventricular  systolic pressure from tricuspid regurgitation is mildly elevated (35-45 mmHg).  · Mild pulmonary hypertension is present.  · Moderate aortic valve stenosis is present.  · Left ventricular diastolic function is consistent with (grade I) impaired relaxation.      Labs Pending at Discharge:  Pending Results       None            Procedures Performed           Consults:   Consults       No orders found from 1/18/2025 to 2/17/2025.              Discharge Details        Discharge Medications        New Medications        Instructions Start Date   cephalexin 500 MG capsule  Commonly known as: Keflex   500 mg, Oral, 3 Times Daily             Continue These Medications        Instructions Start Date   albuterol sulfate  (90 Base) MCG/ACT inhaler  Commonly known as: PROVENTIL HFA;VENTOLIN HFA;PROAIR HFA   2 puffs, Inhalation, Every 4 Hours PRN      aspirin 81 MG EC tablet   81 mg, Daily      budesonide-formoterol 160-4.5 MCG/ACT inhaler  Commonly known as: SYMBICORT   2 puffs, Inhalation, 2 Times Daily      buPROPion 100 MG tablet  Commonly known as: WELLBUTRIN   100 mg, Oral, 2 Times Daily      CBD oral oil  Commonly known as: cannabidiol   1 drop, Daily PRN      cloNIDine 0.2 MG tablet  Commonly known as: CATAPRES   0.2 mg, Oral, Daily      clopidogrel 75 MG tablet  Commonly known as: PLAVIX   75 mg, Oral, Daily      donepezil 10 MG tablet  Commonly known as: ARICEPT   10 mg, Oral, Nightly      gabapentin 300 MG capsule  Commonly known as: NEURONTIN   300 mg, Oral, 3 Times Daily      hydrOXYzine 10 MG tablet  Commonly known as: ATARAX   10 mg, Oral, Daily      levothyroxine 100 MCG tablet  Commonly known as: SYNTHROID, LEVOTHROID   100 mcg, Daily      meclizine 25 MG tablet  Commonly known as: ANTIVERT   25 mg, Oral, 3 Times Daily PRN      memantine 10 MG tablet  Commonly known as: NAMENDA   10 mg, Oral, 2 Times Daily      metFORMIN 1000 MG tablet  Commonly known as: GLUCOPHAGE   1,000 mg, Oral, Daily With  Breakfast      omeprazole 40 MG capsule  Commonly known as: priLOSEC   40 mg, Oral, Daily      pravastatin 20 MG tablet  Commonly known as: PRAVACHOL   20 mg, Oral, Daily      ramipril 2.5 MG capsule  Commonly known as: ALTACE   5 mg, Oral, Daily      rOPINIRole 0.5 MG tablet  Commonly known as: REQUIP   TAKE 1 TABLET BY MOUTH EVERY NIGHT AT BEDTIME 1 HOUR BEFORE BEDTIME      venlafaxine  MG 24 hr capsule  Commonly known as: EFFEXOR-XR   150 mg, Oral, Daily      vitamin B-12 1000 MCG tablet  Commonly known as: CYANOCOBALAMIN   1,000 mcg, Daily      Vitamin D (Cholecalciferol) 50 MCG (2000 UT) capsule   2,000 Units, Daily               No Known Allergies      Discharge Disposition:   Skilled Nursing Facility ([place] - External)    Diet: Cardiac diabetic diet          Discharge Activity:   Activity Instructions       Activity as Tolerated                CODE STATUS:  Code Status and Medical Interventions: CPR (Attempt to Resuscitate); Full Support   Ordered at: 02/16/25 3706     Level Of Support Discussed With:    Patient     Code Status (Patient has no pulse and is not breathing):    CPR (Attempt to Resuscitate)     Medical Interventions (Patient has pulse or is breathing):    Full Support         Future Appointments   Date Time Provider Department Center   3/11/2025  2:00 PM Michell Ramirez APRN [facility]   4/22/2025  2:45 PM Seipel, Joseph F, MD [facility]       Additional Instructions for the Follow-ups that You Need to Schedule       Discharge Follow-up with PCP   As directed       Currently Documented PCP:    Michell Ramirez APRN    PCP Phone Number:    [phone]     Follow Up Details: 1 week        Discharge Follow-up with Specified Provider: Nephrology; 1 Week   As directed      To: Nephrology   Follow Up: 1 Week                Time spent on Discharge including face to face service:  > 30 minutes    Signature: Electronically signed by Kimberley Davila MD, 02/21/25, 17:18  EST.  [facility]ist Team

## 2025-02-21 NOTE — CASE MANAGEMENT/SOCIAL WORK
Case Management Discharge Note      Final Note: [facility], skilled.         Selected Continued Care - Discharged on 2/20/2025 Admission date: 2/16/2025 - Discharge disposition: Skilled Nursing Facility ([place] - External)      Destination Coordination complete.      Service Provider Services Address Phone Fax Patient Preferred    [address] [facility] [address] [place] [address] [phone] --             Transportation Services  W/C Van: Skilled Nursing Facility Van    Final Discharge Disposition Code: 03 - skilled nursing facility (SNF)

## 2025-02-22 LAB — GLUCOSE BLDC GLUCOMTR-MCNC: 93 MG/DL (ref 70–105)

## 2025-03-11 ENCOUNTER — OFFICE VISIT (OUTPATIENT)
Dept: FAMILY MEDICINE CLINIC | Facility: CLINIC | Age: 75
End: 2025-03-11
Payer: MEDICARE

## 2025-03-11 VITALS
OXYGEN SATURATION: 94 % | DIASTOLIC BLOOD PRESSURE: 80 MMHG | WEIGHT: 179 LBS | HEIGHT: 65 IN | BODY MASS INDEX: 29.82 KG/M2 | HEART RATE: 67 BPM | SYSTOLIC BLOOD PRESSURE: 128 MMHG

## 2025-03-11 DIAGNOSIS — K59.00 CONSTIPATION, UNSPECIFIED CONSTIPATION TYPE: ICD-10-CM

## 2025-03-11 DIAGNOSIS — F41.9 ANXIETY AND DEPRESSION: ICD-10-CM

## 2025-03-11 DIAGNOSIS — I10 PRIMARY HYPERTENSION: ICD-10-CM

## 2025-03-11 DIAGNOSIS — R41.89 COGNITIVE IMPAIRMENT: ICD-10-CM

## 2025-03-11 DIAGNOSIS — E11.69 TYPE 2 DIABETES MELLITUS WITH OTHER SPECIFIED COMPLICATION, WITHOUT LONG-TERM CURRENT USE OF INSULIN: Primary | ICD-10-CM

## 2025-03-11 DIAGNOSIS — J44.9 CHRONIC OBSTRUCTIVE PULMONARY DISEASE, UNSPECIFIED COPD TYPE: ICD-10-CM

## 2025-03-11 DIAGNOSIS — N32.81 OAB (OVERACTIVE BLADDER): ICD-10-CM

## 2025-03-11 DIAGNOSIS — N39.0 RECURRENT UTI: ICD-10-CM

## 2025-03-11 DIAGNOSIS — E03.9 ACQUIRED HYPOTHYROIDISM: ICD-10-CM

## 2025-03-11 DIAGNOSIS — G25.81 RESTLESS LEG SYNDROME: ICD-10-CM

## 2025-03-11 DIAGNOSIS — F32.A ANXIETY AND DEPRESSION: ICD-10-CM

## 2025-03-11 LAB
DEPRECATED RDW RBC AUTO: 45 FL (ref 37–54)
ERYTHROCYTE [DISTWIDTH] IN BLOOD BY AUTOMATED COUNT: 13.2 % (ref 12.3–15.4)
HCT VFR BLD AUTO: 34.7 % (ref 34–46.6)
HGB BLD-MCNC: 11 G/DL (ref 12–15.9)
MCH RBC QN AUTO: 29.5 PG (ref 26.6–33)
MCHC RBC AUTO-ENTMCNC: 31.7 G/DL (ref 31.5–35.7)
MCV RBC AUTO: 93 FL (ref 79–97)
PLATELET # BLD AUTO: 332 10*3/MM3 (ref 140–450)
PMV BLD AUTO: 9.9 FL (ref 6–12)
RBC # BLD AUTO: 3.73 10*6/MM3 (ref 3.77–5.28)
WBC NRBC COR # BLD AUTO: 7.25 10*3/MM3 (ref 3.4–10.8)

## 2025-03-11 PROCEDURE — 80053 COMPREHEN METABOLIC PANEL: CPT | Performed by: NURSE PRACTITIONER

## 2025-03-11 PROCEDURE — 83036 HEMOGLOBIN GLYCOSYLATED A1C: CPT | Performed by: NURSE PRACTITIONER

## 2025-03-11 PROCEDURE — 85027 COMPLETE CBC AUTOMATED: CPT | Performed by: NURSE PRACTITIONER

## 2025-03-11 PROCEDURE — 84439 ASSAY OF FREE THYROXINE: CPT | Performed by: NURSE PRACTITIONER

## 2025-03-11 PROCEDURE — 84443 ASSAY THYROID STIM HORMONE: CPT | Performed by: NURSE PRACTITIONER

## 2025-03-11 PROCEDURE — 36415 COLL VENOUS BLD VENIPUNCTURE: CPT | Performed by: NURSE PRACTITIONER

## 2025-03-11 RX ORDER — DOCUSATE SODIUM 100 MG/1
100 CAPSULE, LIQUID FILLED ORAL 2 TIMES DAILY
Qty: 60 CAPSULE | Refills: 2 | Status: SHIPPED | OUTPATIENT
Start: 2025-03-11

## 2025-03-11 RX ORDER — CIPROFLOXACIN 250 MG/1
250 TABLET, FILM COATED ORAL DAILY
Qty: 30 TABLET | Refills: 1 | Status: SHIPPED | OUTPATIENT
Start: 2025-03-11 | End: 2025-04-10

## 2025-03-11 NOTE — ASSESSMENT & PLAN NOTE
Hypertension is stable and controlled  Continue current treatment regimen.  Ambulatory blood pressure monitoring.  Blood pressure will be reassessed in 3 months.

## 2025-03-11 NOTE — ASSESSMENT & PLAN NOTE
Patient's depression is a recurrent episode that is mild without psychosis. Depression is in partial remission and stable.    Plan:   Continue current medication therapy     Followup at the next regular appointment.

## 2025-03-11 NOTE — PROGRESS NOTES
Venipuncture Blood Specimen Collection  Venipuncture performed in the left arm by Sachi Velásquez MA with good hemostasis. Patient tolerated the procedure well without complications.   03/11/25   Sachi Velásquez MA

## 2025-03-11 NOTE — ASSESSMENT & PLAN NOTE
Diabetes is stable.   Continue current treatment regimen.  Recommended an ADA diet.  Regular aerobic exercise.  Reminded to get yearly retinal exam.  Diabetes will be reassessed  in 4 months

## 2025-03-11 NOTE — PROGRESS NOTES
"Chief Complaint  Follow-up (4 month follow up DM/had a hospital stay and stayed at [facility] until last Friday )    Subjective        Radha Barbour presents to [facility]  History of Present Illness    Patient presents for f/u visit.      Patient has hypertension, prescribed 0.2 mg of clonidine daily, Norvasc 5 mg daily and ramipril 5 mg daily. Blood pressure is stable. Patient denies headaches,  vision changes, chest pain, edema. Patient has chronic dizziness, takes Meclizine PRN. Patient has not had any falls in the last month.      Patient has type 2 diabetes, taking Metformin 1 g daily.  Last A1c was 6.9. Patient is not monitoring glucose levels.      She is taking Levothyroxine 100 mcg daily for hypothyroidism - decreased during hospital stay.     Hx COPD, prescribed Symbicort 2 puffs BID - taking as prescribed. Patient has intermittent exertional dyspnea, using albuterol PRN.      Patient is taking Wellbutrin 150 mg daily and Effexor 150 mg daily for depression and anxiety, symptoms are stable.       RLS, stable on Requip 0.5 mg nightly.      Patient evaluated by Urology regarding incontinence and increased PVR. She has had interstim placement.  Patient was hospitalized in February for acute UTI with pyuria and renal insufficiency.  Patient stayed briefly at [facility], has now returned home. She is working with  and physical therapy through [facility].     Today, patient reports fecal incontinence. She was hospitalized at [facility] in January - diagnosed with Colitis and SBO, treated conservatively. Patient reports only having bowel movements at night, last BM was Sunday. Patient is not taking anything for constipation.     Hx cognitive impairment, taking Namenda 10 mg BID. She is followed by Dr. Seipel.     Objective   Vital Signs:  /80 (BP Location: Left arm, Patient Position: Sitting, Cuff Size: Adult)   Pulse 67   Ht 165.1 cm (65\")   Wt 81.2 kg (179 " "lb)   SpO2 94%   BMI 29.79 kg/m²   Estimated body mass index is 29.79 kg/m² as calculated from the following:    Height as of this encounter: 165.1 cm (65\").    Weight as of this encounter: 81.2 kg (179 lb).          Physical Exam  Constitutional:       Appearance: Normal appearance.   HENT:      Head: Normocephalic.   Cardiovascular:      Rate and Rhythm: Normal rate and regular rhythm.   Pulmonary:      Effort: Pulmonary effort is normal.      Breath sounds: Normal breath sounds.   Abdominal:      General: Abdomen is flat. Bowel sounds are normal.      Palpations: Abdomen is soft.   Musculoskeletal:         General: Normal range of motion.      Cervical back: Neck supple.      Right lower leg: No edema.      Left lower leg: No edema.   Skin:     General: Skin is warm and dry.   Neurological:      Mental Status: She is alert and oriented to person, place, and time.      Gait: Gait is intact.   Psychiatric:         Attention and Perception: Attention normal.         Mood and Affect: Mood normal.         Speech: Speech normal.        Result Review :    CMP          2/18/2025    02:12 2/19/2025    05:49 2/20/2025    03:59   CMP   Glucose 138  118  97    BUN 23  9  12    Creatinine 1.18  0.69  0.75    EGFR 48.3  90.6  83.1    Sodium 145  142  142    Potassium 3.5  3.4  3.1    Chloride 112  104  104    Calcium 8.2  8.7  8.7    BUN/Creatinine Ratio 19.5  13.0  16.0    Anion Gap 10.0  12.3  10.8      CBC          2/18/2025    02:12 2/19/2025    05:49 2/20/2025    03:59   CBC   WBC 6.46  7.90  7.08    RBC 3.43  4.04  3.82    Hemoglobin 9.9  11.4  10.9    Hematocrit 33.5  37.0  35.0    MCV 97.7  91.6  91.6    MCH 28.9  28.2  28.5    MCHC 29.6  30.8  31.1    RDW 14.4  14.1  13.9    Platelets 246  293  261      Lipid Panel          7/5/2024    14:38   Lipid Panel   Total Cholesterol 175    Triglycerides 126    HDL Cholesterol 95    VLDL Cholesterol 21    LDL Cholesterol  59    LDL/HDL Ratio 0.58      TSH          3/26/2024 "    15:24 7/5/2024    14:38   TSH   TSH 0.813  0.741      Most Recent A1C          11/7/2024    14:36   HGBA1C Most Recent   Hemoglobin A1C 6.90                Assessment and Plan   Diagnoses and all orders for this visit:    1. Type 2 diabetes mellitus with other specified complication, without long-term current use of insulin (Primary)  Assessment & Plan:  Diabetes is stable.   Continue current treatment regimen.  Recommended an ADA diet.  Regular aerobic exercise.  Reminded to get yearly retinal exam.  Diabetes will be reassessed  in 4 months    Orders:  -     Cancel: Microalbumin / Creatinine Urine Ratio - Urine, Clean Catch  -     CBC (No Diff)  -     Comprehensive Metabolic Panel  -     Hemoglobin A1c    2. Chronic obstructive pulmonary disease, unspecified COPD type  Assessment & Plan:  COPD is stable.    Plan:  Continue same medication/s without change.    Discussed medication dosage, use, side effects, and goals of treatment in detail.    Warning signs of respiratory distress were reviewed with the patient. .    Patient Treatment Goals:   symptom prevention, minimizing limitation in activity, prevention of exacerbations and use of ER/inpatient care, maintenance of optimal pulmonary function, and minimization of adverse effects of treatment    Followup at the next regular appointment.      3. Restless leg syndrome  Assessment & Plan:  Stable on Requip 0.5 mg nightly      4. Primary hypertension  Assessment & Plan:  Hypertension is stable and controlled  Continue current treatment regimen.  Ambulatory blood pressure monitoring.  Blood pressure will be reassessed in 3 months.      5. Acquired hypothyroidism  Assessment & Plan:  Taking Synthroid 100 mcg daily - repeat labs today    Orders:  -     TSH  -     T4, Free    6. Cognitive impairment  Assessment & Plan:  Continue Namenda 10 mg BID  Followed by Neurology      7. Anxiety and depression  Assessment & Plan:  Patient's depression is a recurrent episode that  is mild without psychosis. Depression is in partial remission and stable.    Plan:   Continue current medication therapy     Followup at the next regular appointment.       8. OAB (overactive bladder)  Assessment & Plan:  Follow up with Urology      9. Constipation, unspecified constipation type  Assessment & Plan:  Start daily fiber supplement  Start Colace 1 to 2 capsules daily, hold for diarrhea  The goal is for patient to have a bowel movement daily, no more than every other day    Orders:  -     docusate sodium (Colace) 100 MG capsule; Take 1 capsule by mouth 2 (Two) Times a Day.  Dispense: 60 capsule; Refill: 2    10. Recurrent UTI  -     Urinalysis With Culture If Indicated - Urine, Clean Catch; Future  -     ciprofloxacin (Cipro) 250 MG tablet; Take 1 tablet by mouth Daily for 30 days. Indications: Urinary Tract Infection  Dispense: 30 tablet; Refill: 1  -     Cancel: Urinalysis With Culture If Indicated - Urine, Clean Catch           I spent 30 minutes caring for Radha on this date of service. This time includes time spent by me in the following activities:preparing for the visit, reviewing tests, obtaining and/or reviewing a separately obtained history, performing a medically appropriate examination and/or evaluation , counseling and educating the patient/family/caregiver, ordering medications, tests, or procedures, documenting information in the medical record, and care coordination  Follow Up   Return in about 3 months (around 6/11/2025) for Medicare Wellness.  Patient was given instructions and counseling regarding her condition or for health maintenance advice. Please see specific information pulled into the AVS if appropriate.

## 2025-03-12 ENCOUNTER — RESULTS FOLLOW-UP (OUTPATIENT)
Dept: FAMILY MEDICINE CLINIC | Facility: CLINIC | Age: 75
End: 2025-03-12
Payer: MEDICARE

## 2025-03-12 LAB
ALBUMIN SERPL-MCNC: 3.7 G/DL (ref 3.5–5.2)
ALBUMIN/GLOB SERPL: 1.2 G/DL
ALP SERPL-CCNC: 100 U/L (ref 39–117)
ALT SERPL W P-5'-P-CCNC: 11 U/L (ref 1–33)
ANION GAP SERPL CALCULATED.3IONS-SCNC: 12 MMOL/L (ref 5–15)
AST SERPL-CCNC: 16 U/L (ref 1–32)
BILIRUB SERPL-MCNC: <0.2 MG/DL (ref 0–1.2)
BUN SERPL-MCNC: 13 MG/DL (ref 8–23)
BUN/CREAT SERPL: 14.9 (ref 7–25)
CALCIUM SPEC-SCNC: 8.9 MG/DL (ref 8.6–10.5)
CHLORIDE SERPL-SCNC: 101 MMOL/L (ref 98–107)
CO2 SERPL-SCNC: 29 MMOL/L (ref 22–29)
CREAT SERPL-MCNC: 0.87 MG/DL (ref 0.57–1)
EGFRCR SERPLBLD CKD-EPI 2021: 69.6 ML/MIN/1.73
GLOBULIN UR ELPH-MCNC: 3.2 GM/DL
GLUCOSE SERPL-MCNC: 87 MG/DL (ref 65–99)
HBA1C MFR BLD: 6.8 % (ref 4.8–5.6)
POTASSIUM SERPL-SCNC: 4.3 MMOL/L (ref 3.5–5.2)
PROT SERPL-MCNC: 6.9 G/DL (ref 6–8.5)
SODIUM SERPL-SCNC: 142 MMOL/L (ref 136–145)
T4 FREE SERPL-MCNC: 0.71 NG/DL (ref 0.92–1.68)
TSH SERPL DL<=0.05 MIU/L-ACNC: 17 UIU/ML (ref 0.27–4.2)

## 2025-03-17 ENCOUNTER — TELEPHONE (OUTPATIENT)
Dept: FAMILY MEDICINE CLINIC | Facility: CLINIC | Age: 75
End: 2025-03-17
Payer: MEDICARE

## 2025-03-17 NOTE — TELEPHONE ENCOUNTER
Caller: SHEFALI WITH [employer]    Relationship to patient:     Best call back number:      Patient is needing: PLEASE CALL SHEFALI WITH A VERBAL ORDER FOR PHYSICAL THERAPY, FREQUENCY TWICE A WEEK FOR 8 WEEKS.

## 2025-03-20 RX ORDER — CLONIDINE HYDROCHLORIDE 0.2 MG/1
0.2 TABLET ORAL DAILY
Qty: 90 TABLET | Refills: 1 | Status: SHIPPED | OUTPATIENT
Start: 2025-03-20

## 2025-03-20 RX ORDER — LEVOTHYROXINE SODIUM 100 UG/1
100 TABLET ORAL DAILY
Qty: 1 TABLET | Refills: 0 | OUTPATIENT
Start: 2025-03-20

## 2025-03-21 NOTE — TELEPHONE ENCOUNTER
Message was sent to this patient about her results most recently.  Please see if this medication is being taken first thing in the morning before food or other medication as her TSH was too high

## 2025-03-21 NOTE — TELEPHONE ENCOUNTER
Spoke with patient and she said that she has not been taking this medication alone or on an empty stomach in the morning. She said she used to, but got out of the habit. She will start doing that and wondered about getting it refilled and checking labs in 4 weeks after taking medication correctly.

## 2025-03-31 DIAGNOSIS — E03.9 ACQUIRED HYPOTHYROIDISM: Primary | ICD-10-CM

## 2025-04-21 ENCOUNTER — TELEPHONE (OUTPATIENT)
Dept: NEUROLOGY | Facility: CLINIC | Age: 75
End: 2025-04-21
Payer: MEDICARE

## 2025-05-07 RX ORDER — LEVOTHYROXINE SODIUM 100 UG/1
100 TABLET ORAL DAILY
Qty: 90 TABLET | OUTPATIENT
Start: 2025-05-07

## 2025-05-07 RX ORDER — RAMIPRIL 2.5 MG/1
5 CAPSULE ORAL DAILY
Qty: 180 CAPSULE | Refills: 1 | Status: SHIPPED | OUTPATIENT
Start: 2025-05-07

## 2025-05-07 RX ORDER — PRAVASTATIN SODIUM 20 MG
20 TABLET ORAL DAILY
Qty: 90 TABLET | Refills: 1 | Status: SHIPPED | OUTPATIENT
Start: 2025-05-07

## 2025-05-07 RX ORDER — NITROFURANTOIN 25; 75 MG/1; MG/1
100 CAPSULE ORAL 2 TIMES DAILY
Qty: 14 CAPSULE | Refills: 0 | OUTPATIENT
Start: 2025-05-07

## 2025-05-07 RX ORDER — MEMANTINE HYDROCHLORIDE 10 MG/1
10 TABLET ORAL 2 TIMES DAILY
Qty: 180 TABLET | Refills: 1 | Status: SHIPPED | OUTPATIENT
Start: 2025-05-07

## 2025-05-18 ENCOUNTER — APPOINTMENT (OUTPATIENT)
Dept: CT IMAGING | Facility: HOSPITAL | Age: 75
End: 2025-05-18
Payer: MEDICARE

## 2025-05-18 ENCOUNTER — APPOINTMENT (OUTPATIENT)
Dept: GENERAL RADIOLOGY | Facility: HOSPITAL | Age: 75
End: 2025-05-18
Payer: MEDICARE

## 2025-05-18 ENCOUNTER — HOSPITAL ENCOUNTER (EMERGENCY)
Facility: HOSPITAL | Age: 75
Discharge: HOME | End: 2025-05-18
Attending: EMERGENCY MEDICINE | Admitting: EMERGENCY MEDICINE
Payer: MEDICARE

## 2025-05-18 VITALS
SYSTOLIC BLOOD PRESSURE: 136 MMHG | RESPIRATION RATE: 18 BRPM | TEMPERATURE: 98.5 F | BODY MASS INDEX: 27.96 KG/M2 | OXYGEN SATURATION: 94 % | HEIGHT: 65 IN | DIASTOLIC BLOOD PRESSURE: 59 MMHG | WEIGHT: 167.8 LBS | HEART RATE: 62 BPM

## 2025-05-18 DIAGNOSIS — S09.90XA CLOSED HEAD INJURY WITHOUT CONCUSSION, INITIAL ENCOUNTER: Primary | ICD-10-CM

## 2025-05-18 DIAGNOSIS — R42 VERTIGO: ICD-10-CM

## 2025-05-18 DIAGNOSIS — S61.419A SKIN TEAR OF HAND WITHOUT COMPLICATION, INITIAL ENCOUNTER: ICD-10-CM

## 2025-05-18 DIAGNOSIS — E86.0 MILD DEHYDRATION: ICD-10-CM

## 2025-05-18 LAB
ALBUMIN SERPL-MCNC: 4.1 G/DL (ref 3.5–5.2)
ALBUMIN/GLOB SERPL: 1.6 G/DL
ALP SERPL-CCNC: 98 U/L (ref 39–117)
ALT SERPL W P-5'-P-CCNC: 12 U/L (ref 1–33)
ANION GAP SERPL CALCULATED.3IONS-SCNC: 13.9 MMOL/L (ref 5–15)
AST SERPL-CCNC: 16 U/L (ref 1–32)
BASOPHILS # BLD AUTO: 0.07 10*3/MM3 (ref 0–0.2)
BASOPHILS NFR BLD AUTO: 0.7 % (ref 0–1.5)
BILIRUB SERPL-MCNC: 0.2 MG/DL (ref 0–1.2)
BILIRUB UR QL STRIP: ABNORMAL
BUN SERPL-MCNC: 19 MG/DL (ref 8–23)
BUN/CREAT SERPL: 10.5 (ref 7–25)
CALCIUM SPEC-SCNC: 8.9 MG/DL (ref 8.6–10.5)
CHLORIDE SERPL-SCNC: 100 MMOL/L (ref 98–107)
CLARITY UR: CLEAR
CO2 SERPL-SCNC: 23.1 MMOL/L (ref 22–29)
COLOR UR: YELLOW
CREAT SERPL-MCNC: 1.81 MG/DL (ref 0.57–1)
DEPRECATED RDW RBC AUTO: 44.3 FL (ref 37–54)
EGFRCR SERPLBLD CKD-EPI 2021: 28.9 ML/MIN/1.73
EOSINOPHIL # BLD AUTO: 0.34 10*3/MM3 (ref 0–0.4)
EOSINOPHIL NFR BLD AUTO: 3.3 % (ref 0.3–6.2)
ERYTHROCYTE [DISTWIDTH] IN BLOOD BY AUTOMATED COUNT: 13 % (ref 12.3–15.4)
GLOBULIN UR ELPH-MCNC: 2.6 GM/DL
GLUCOSE SERPL-MCNC: 153 MG/DL (ref 65–99)
GLUCOSE UR STRIP-MCNC: NEGATIVE MG/DL
HCT VFR BLD AUTO: 36 % (ref 34–46.6)
HGB BLD-MCNC: 11.2 G/DL (ref 12–15.9)
HGB UR QL STRIP.AUTO: ABNORMAL
HOLD SPECIMEN: NORMAL
HOLD SPECIMEN: NORMAL
IMM GRANULOCYTES # BLD AUTO: 0.11 10*3/MM3 (ref 0–0.05)
IMM GRANULOCYTES NFR BLD AUTO: 1.1 % (ref 0–0.5)
KETONES UR QL STRIP: ABNORMAL
LEUKOCYTE ESTERASE UR QL STRIP.AUTO: ABNORMAL
LYMPHOCYTES # BLD AUTO: 1.09 10*3/MM3 (ref 0.7–3.1)
LYMPHOCYTES NFR BLD AUTO: 10.7 % (ref 19.6–45.3)
MCH RBC QN AUTO: 28.5 PG (ref 26.6–33)
MCHC RBC AUTO-ENTMCNC: 31.1 G/DL (ref 31.5–35.7)
MCV RBC AUTO: 91.6 FL (ref 79–97)
MONOCYTES # BLD AUTO: 0.6 10*3/MM3 (ref 0.1–0.9)
MONOCYTES NFR BLD AUTO: 5.9 % (ref 5–12)
NEUTROPHILS NFR BLD AUTO: 7.96 10*3/MM3 (ref 1.7–7)
NEUTROPHILS NFR BLD AUTO: 78.3 % (ref 42.7–76)
NITRITE UR QL STRIP: NEGATIVE
PH UR STRIP.AUTO: 5.5 [PH] (ref 5–8)
PLATELET # BLD AUTO: 288 10*3/MM3 (ref 140–450)
PMV BLD AUTO: 9.7 FL (ref 6–12)
POTASSIUM SERPL-SCNC: 4.2 MMOL/L (ref 3.5–5.2)
PROT SERPL-MCNC: 6.7 G/DL (ref 6–8.5)
PROT UR QL STRIP: ABNORMAL
QT INTERVAL: 438 MS
QTC INTERVAL: 459 MS
RBC # BLD AUTO: 3.93 10*6/MM3 (ref 3.77–5.28)
SODIUM SERPL-SCNC: 137 MMOL/L (ref 136–145)
SP GR UR STRIP: >=1.03 (ref 1–1.03)
UROBILINOGEN UR QL STRIP: ABNORMAL
WBC NRBC COR # BLD AUTO: 10.17 10*3/MM3 (ref 3.4–10.8)
WHOLE BLOOD HOLD COAG: NORMAL
WHOLE BLOOD HOLD SPECIMEN: NORMAL

## 2025-05-18 PROCEDURE — 73130 X-RAY EXAM OF HAND: CPT

## 2025-05-18 PROCEDURE — 25810000003 SODIUM CHLORIDE 0.9 % SOLUTION: Performed by: EMERGENCY MEDICINE

## 2025-05-18 PROCEDURE — 93005 ELECTROCARDIOGRAM TRACING: CPT | Performed by: EMERGENCY MEDICINE

## 2025-05-18 PROCEDURE — 80053 COMPREHEN METABOLIC PANEL: CPT | Performed by: EMERGENCY MEDICINE

## 2025-05-18 PROCEDURE — 85025 COMPLETE CBC W/AUTO DIFF WBC: CPT | Performed by: EMERGENCY MEDICINE

## 2025-05-18 PROCEDURE — 25010000002 ONDANSETRON PER 1 MG: Performed by: EMERGENCY MEDICINE

## 2025-05-18 PROCEDURE — 81003 URINALYSIS AUTO W/O SCOPE: CPT | Performed by: EMERGENCY MEDICINE

## 2025-05-18 PROCEDURE — 99284 EMERGENCY DEPT VISIT MOD MDM: CPT

## 2025-05-18 PROCEDURE — 96374 THER/PROPH/DIAG INJ IV PUSH: CPT

## 2025-05-18 PROCEDURE — 70450 CT HEAD/BRAIN W/O DYE: CPT

## 2025-05-18 RX ORDER — DIAPER,BRIEF,INFANT-TODD,DISP
1 EACH MISCELLANEOUS ONCE
Status: COMPLETED | OUTPATIENT
Start: 2025-05-18 | End: 2025-05-18

## 2025-05-18 RX ORDER — CEPHALEXIN 500 MG/1
500 CAPSULE ORAL 2 TIMES DAILY
Qty: 14 CAPSULE | Refills: 0 | Status: SHIPPED | OUTPATIENT
Start: 2025-05-18

## 2025-05-18 RX ORDER — ONDANSETRON 2 MG/ML
4 INJECTION INTRAMUSCULAR; INTRAVENOUS ONCE
Status: COMPLETED | OUTPATIENT
Start: 2025-05-18 | End: 2025-05-18

## 2025-05-18 RX ORDER — SODIUM CHLORIDE 0.9 % (FLUSH) 0.9 %
10 SYRINGE (ML) INJECTION AS NEEDED
Status: DISCONTINUED | OUTPATIENT
Start: 2025-05-18 | End: 2025-05-18 | Stop reason: HOSPADM

## 2025-05-18 RX ORDER — MECLIZINE HYDROCHLORIDE 25 MG/1
25 TABLET ORAL ONCE
Status: COMPLETED | OUTPATIENT
Start: 2025-05-18 | End: 2025-05-18

## 2025-05-18 RX ADMIN — ONDANSETRON 4 MG: 2 INJECTION, SOLUTION INTRAMUSCULAR; INTRAVENOUS at 17:37

## 2025-05-18 RX ADMIN — SODIUM CHLORIDE 500 ML: 0.9 INJECTION, SOLUTION INTRAVENOUS at 17:37

## 2025-05-18 RX ADMIN — MECLIZINE HYDROCHLORIDE 25 MG: 25 TABLET ORAL at 17:37

## 2025-05-18 RX ADMIN — BACITRACIN 0.9 G: 500 OINTMENT TOPICAL at 17:50

## 2025-05-18 NOTE — FSED PROVIDER NOTE
Subjective   History of Present Illness  74-year-old female with chronic vertigo on meclizine daily presents with fall and right frontal hematoma and left hand skin tear.  Patient states that she had gotten out of the car and turned suddenly making her feel dizzy, exacerbating her usual vertigo.  Patient states she became off balance and fell forward hitting her head on the pavement as well as her left hand sustaining a laceration.  No loss of consciousness, no vomiting since the episode but states she has been persistently nauseous.  Patient states when she woke up this morning she did not feel well and when she went to [employer] she had 1 episode of vomiting associated with her typical vertigo.  States she deals with vertigo daily and has been for years.  Takes meclizine daily for the same.  No cough or cold, symptoms, no fevers or chills.  No neck pain, no focal weakness, numbness or tingling.  Patient states yesterday her stomach was little and easy and she had nausea, states she struggles with acid reflux regularly as well.  No chest pain or shortness of breath, no exertional intolerance.    History provided by:  Patient      Review of Systems   All other systems reviewed and are negative.      Past Medical History:   Diagnosis Date    Anxiety     Arthritis 1998    B12 deficiency 07/08/2016    Last Assessment & Plan:  Formatting of this note might be different from the original.  Compliant and controlled with current medication B 12 supplements . Lab today B 12    Back pain, chronic 07/08/2016    Last Assessment & Plan:  Formatting of this note might be different from the original. Will refer to PT    Cancer 2009    Colon    Colon polyp 2005    Colon Cancer    Coronary artery disease involving native heart without angina pectoris 06/02/2022    Dementia 2021    Doctor prescribed medicine for dementia/memory loss    Depression 07/08/2016    Last Assessment & Plan:  Formatting of this note might be different from the  original.  Compliant and controlled with current medication    Diabetes mellitus 2022    Diabetic peripheral neuropathy 2020    Last Assessment & Plan:  Formatting of this note might be different from the original. Stable    Difficulty walking     Extreme unsteadiness on feet    DM type 2, goal HbA1c < 7% 2017    Last Assessment & Plan:  Formatting of this note might be different from the original.  Compliant and controlled with current diet.llabs today A1c    Dyslipidemia 10/11/2017    Last Assessment & Plan:  Formatting of this note might be different from the original.  Compliant and controlled with current medication/statin therapy/labs today    Environmental and seasonal allergies 10/11/2019    Gastric reflux 2017    HTN, goal below 130/80 02/10/2016    Last Assessment & Plan:  Formatting of this note might be different from the original. Compliant and controlled with current medication ramipril /labs today cmp to check renal function and electrolytes    Hyperlipidemia     Hypothyroidism     Last Assessment & Plan:  Formatting of this note might be different from the original. TSH low reduce levothyroxine.  Will check TSH today.    Insomnia 2015    Lumbosacral radiculopathy 2020    Last Assessment & Plan:  Formatting of this note might be different from the original.  Compliant and controlled with current medication Muscle relaxer    Memory loss     Osteoporosis 2020    Formatting of this note is different from the original. RADIOLOGY REPORT   FACILITY:  [facility]/AGE/GENDER: [id number]  OP      AGE:69 Y          SEX:F PATIENT NAME/:  GM DERAS    1950 UNIT NUMBER:  [id number] ACCOUNT NUMBER:  [id number] ACCESSION NUMBER:  [id number]     EXAMINATION: Bone densitometry of multiple sites, lumbar spine, left hip and distal    Restless leg syndrome 2018    Last Assessment & Plan:  Formatting of this note might be  different from the original.  Compliant and controlled with current medication requip    Shortness of breath     Sleep apnea     Not wearing CPAP    TIA (transient ischemic attack) 2024       No Known Allergies    Past Surgical History:   Procedure Laterality Date    BACK SURGERY  2007    lower    CARDIAC CATHETERIZATION Right 04/15/2022    Procedure: Left Heart Cath;  Surgeon: Laila Alvarez MD;  Location: [facility] CATH INVASIVE LOCATION;  Service: Cardiovascular;  Laterality: Right;    CARDIAC CATHETERIZATION  4/15/2022    COLON SURGERY  2006    REPLACEMENT TOTAL KNEE Left     redone in 2010    REPLACEMENT TOTAL KNEE Right        Family History   Problem Relation Age of Onset    Hypertension Mother     Arthritis Mother     Colon cancer Mother     Thyroid disease Mother     Stroke Mother     Cancer Mother         Colon    Dementia Mother     Heart disease Sister     Heart disease Sister     Dementia Maternal Aunt     Dementia Maternal Aunt        Social History     Socioeconomic History    Marital status:    Tobacco Use    Smoking status: Former     Current packs/day: 0.00     Average packs/day: 2.0 packs/day for 20.0 years (40.0 ttl pk-yrs)     Types: Cigarettes     Start date: 1978     Quit date: 1998     Years since quittin.3    Smokeless tobacco: Never   Vaping Use    Vaping status: Never Used   Substance and Sexual Activity    Alcohol use: Yes     Comment: socially    Drug use: Never     Comment: CBD oils    Sexual activity: Not Currently     Partners: Male     Birth control/protection: Post-menopausal, None           Objective   Physical Exam  Vitals and nursing note reviewed.   Constitutional:       Appearance: Normal appearance.   HENT:      Head:      Comments: Large hematoma to right eyebrow, forehead and eyelid     Nose: Nose normal.      Mouth/Throat:      Mouth: Mucous membranes are moist.      Pharynx: Oropharynx is clear.   Eyes:      Extraocular  Movements: Extraocular movements intact.   Cardiovascular:      Rate and Rhythm: Normal rate and regular rhythm.      Heart sounds: Normal heart sounds.   Pulmonary:      Effort: Pulmonary effort is normal.      Breath sounds: Normal breath sounds.   Abdominal:      General: Bowel sounds are normal.      Palpations: Abdomen is soft.      Tenderness: There is no abdominal tenderness.   Musculoskeletal:         General: Normal range of motion.      Cervical back: Normal range of motion and neck supple. No tenderness.      Comments: No TLS spine tenderness   Skin:     General: Skin is warm and dry.      Comments: Large skin tear to left dorsal hand over fifth metacarpal, bleeding controlled.    Neurological:      General: No focal deficit present.      Mental Status: She is alert and oriented to person, place, and time.   Psychiatric:         Mood and Affect: Mood normal.         Behavior: Behavior normal.         ECG 12 Lead      Date/Time: 5/18/2025 5:06 PM    Performed by: Yola Lerma MD  Authorized by: Yola Lerma MD  Interpreted by ED physician  Comparison: not compared with previous ECG   Previous ECG: no previous ECG available  Rhythm: sinus rhythm  Rate: normal  BPM: 66  Conduction: conduction normal  ST Segments: ST segments normal  T Waves: T waves normal  Other findings: early repolarization and LVH  Clinical impression: non-specific ECG               ED Course  Results for orders placed or performed during the hospital encounter of 05/18/25   ECG 12 Lead Other; dizziness    Collection Time: 05/18/25  5:06 PM   Result Value Ref Range    QT Interval 438 ms    QTC Interval 459 ms   Comprehensive Metabolic Panel    Collection Time: 05/18/25  5:15 PM    Specimen: Blood   Result Value Ref Range    Glucose 153 (H) 65 - 99 mg/dL    BUN 19 8 - 23 mg/dL    Creatinine 1.81 (H) 0.57 - 1.00 mg/dL    Sodium 137 136 - 145 mmol/L    Potassium 4.2 3.5 - 5.2 mmol/L    Chloride 100 98 - 107 mmol/L    CO2  23.1 22.0 - 29.0 mmol/L    Calcium 8.9 8.6 - 10.5 mg/dL    Total Protein 6.7 6.0 - 8.5 g/dL    Albumin 4.1 3.5 - 5.2 g/dL    ALT (SGPT) 12 1 - 33 U/L    AST (SGOT) 16 1 - 32 U/L    Alkaline Phosphatase 98 39 - 117 U/L    Total Bilirubin 0.2 0.0 - 1.2 mg/dL    Globulin 2.6 gm/dL    A/G Ratio 1.6 g/dL    BUN/Creatinine Ratio 10.5 7.0 - 25.0    Anion Gap 13.9 5.0 - 15.0 mmol/L    eGFR 28.9 (L) >60.0 mL/min/1.73   CBC Auto Differential    Collection Time: 05/18/25  5:15 PM    Specimen: Blood   Result Value Ref Range    WBC 10.17 3.40 - 10.80 10*3/mm3    RBC 3.93 3.77 - 5.28 10*6/mm3    Hemoglobin 11.2 (L) 12.0 - 15.9 g/dL    Hematocrit 36.0 34.0 - 46.6 %    MCV 91.6 79.0 - 97.0 fL    MCH 28.5 26.6 - 33.0 pg    MCHC 31.1 (L) 31.5 - 35.7 g/dL    RDW 13.0 12.3 - 15.4 %    RDW-SD 44.3 37.0 - 54.0 fl    MPV 9.7 6.0 - 12.0 fL    Platelets 288 140 - 450 10*3/mm3    Neutrophil % 78.3 (H) 42.7 - 76.0 %    Lymphocyte % 10.7 (L) 19.6 - 45.3 %    Monocyte % 5.9 5.0 - 12.0 %    Eosinophil % 3.3 0.3 - 6.2 %    Basophil % 0.7 0.0 - 1.5 %    Immature Grans % 1.1 (H) 0.0 - 0.5 %    Neutrophils, Absolute 7.96 (H) 1.70 - 7.00 10*3/mm3    Lymphocytes, Absolute 1.09 0.70 - 3.10 10*3/mm3    Monocytes, Absolute 0.60 0.10 - 0.90 10*3/mm3    Eosinophils, Absolute 0.34 0.00 - 0.40 10*3/mm3    Basophils, Absolute 0.07 0.00 - 0.20 10*3/mm3    Immature Grans, Absolute 0.11 (H) 0.00 - 0.05 10*3/mm3   Green Top (Gel)    Collection Time: 05/18/25  5:15 PM   Result Value Ref Range    Extra Tube Hold for add-ons.    Lavender Top    Collection Time: 05/18/25  5:15 PM   Result Value Ref Range    Extra Tube hold for add-on    Gold Top - SST    Collection Time: 05/18/25  5:15 PM   Result Value Ref Range    Extra Tube Hold for add-ons.    Light Blue Top    Collection Time: 05/18/25  5:15 PM   Result Value Ref Range    Extra Tube Hold for add-ons.    Urinalysis without microscopic (no culture) - Urine, Clean Catch    Collection Time: 05/18/25  6:06 PM     Specimen: Urine, Clean Catch   Result Value Ref Range    Color, UA Yellow Yellow, Straw    Appearance, UA Clear Clear    pH, UA 5.5 5.0 - 8.0    Specific Gravity, UA >=1.030 1.005 - 1.030    Glucose, UA Negative Negative    Ketones, UA 15 mg/dL (1+) (A) Negative    Bilirubin, UA Small (1+) (A) Negative    Blood, UA Trace (A) Negative    Protein,  mg/dL (2+) (A) Negative    Leuk Esterase, UA Trace (A) Negative    Nitrite, UA Negative Negative    Urobilinogen, UA 0.2 E.U./dL 0.2 - 1.0 E.U./dL      XR Hand 3+ View Left   Final Result   Impression:   Within limitations of demineralization, no acute osseous findings of the left hand.         Electronically Signed: Jonn Chapin MD     5/18/2025 6:05 PM EDT     Workstation ID: [id number]      CT Head Without Contrast   Final Result   Impression:   1.Left frontal scalp hematoma. No underlying fracture.   2.No acute intracranial abnormality.   3.Senescent changes of the brain.            Electronically Signed: Niko Simmons MD     5/18/2025 5:43 PM EDT     Workstation ID: [id number]         Medications   sodium chloride 0.9 % flush 10 mL (has no administration in time range)   sodium chloride 0.9 % bolus 500 mL (500 mL Intravenous New Bag 5/18/25 1737)   meclizine (ANTIVERT) tablet 25 mg (25 mg Oral Given 5/18/25 1737)   ondansetron (ZOFRAN) injection 4 mg (4 mg Intravenous Given 5/18/25 1737)   bacitracin ointment 0.9 g (0.9 g Topical Given 5/18/25 5710)              Medical Decision Making  Multiple differential diagnoses were considered including but not limited to hematoma, ICH, vertigo, UTI, electrolyte imbalance, skull fracture less likely, I doubt ACS or CVA.     Patient is nontoxic afebrile, tolerating p.o. and in no distress.  Patient is stable to follow-up with outpatient primary care physician after trial of p.o keflex.      Amount and/or Complexity of Data Reviewed  Labs: ordered. Decision-making details documented in ED Course.  Radiology: ordered.  Decision-making details documented in ED Course.  ECG/medicine tests: ordered and independent interpretation performed. Decision-making details documented in ED Course.    Risk  OTC drugs.  Prescription drug management.        Final diagnoses:   Closed head injury without concussion, initial encounter   Skin tear of hand without complication, initial encounter   Vertigo   Mild dehydration       ED Disposition  ED Disposition       ED Disposition   Discharge    Condition   Stable    Comment   --               Michell Ramirez APRN  [address] [facility] [address]  [address] [place] [address] [place] [address] [phone]    Schedule an appointment as soon as possible for a visit in 2 days           Medication List        New Prescriptions      cephalexin 500 MG capsule  Commonly known as: KEFLEX  Take 1 capsule by mouth 2 (Two) Times a Day.               Where to Get Your Medications        These medications were sent to [facility] [id number] - [place] [address] [address]  - [phone] [facility] [phone] [address] [place] [address]      Phone: [phone]   cephalexin 500 MG capsule

## 2025-05-18 NOTE — DISCHARGE INSTRUCTIONS
Your urine showed that you were mildly dehydrated and that you may have early signs of a urinary tract infection.  Push plenty of fluids at home.  Your CAT scan did not show any bleeding in the brain or skull fracture, you are stable for normal activities tomorrow but rest today.

## 2025-05-18 NOTE — ED NOTES
Pt and family was informed that the visit today will be transitioned from Urgent Care to Emergency Room Care and billing. Understanding expressed, ER ACKNOWLEDGEMENT form signed, and all questions answered.

## 2025-05-19 RX ORDER — OMEPRAZOLE 40 MG/1
40 CAPSULE, DELAYED RELEASE ORAL DAILY
Qty: 90 CAPSULE | Refills: 1 | Status: SHIPPED | OUTPATIENT
Start: 2025-05-19

## 2025-05-19 RX ORDER — GABAPENTIN 300 MG/1
300 CAPSULE ORAL 3 TIMES DAILY
Qty: 270 CAPSULE | Refills: 0 | Status: SHIPPED | OUTPATIENT
Start: 2025-05-19

## 2025-05-21 ENCOUNTER — PATIENT OUTREACH (OUTPATIENT)
Dept: CASE MANAGEMENT | Facility: OTHER | Age: 75
End: 2025-05-21
Payer: MEDICARE

## 2025-05-21 NOTE — OUTREACH NOTE
AMBULATORY CASE MANAGEMENT NOTE    Names and Relationships of Patient/Support Persons: Contact: Radha Barbour; Relationship: Self -     Patient Outreach    Pt discharged from [facility] ED on 5/18/25, seen for CHI, skin tear hand, vertigo, mild dehydration. RN-ACM outreach call made to pt. Explained role of RN-ACM and reason for call. Pt reports improvement in symptoms. Reviewed ED AVS with pt. Education provided. She reports to be taking antibiotic rx as directed. Pt reports she is following up with her PCP. Reviewed SDOH. No needs or questions per pt, advised her to call with any. Follow up outreach prn.     Send Education  Questions/Answers      Flowsheet Row Most Recent Value   Annual Wellness Visit:  Patient Has Completed  [scheduled for 6/12/25]   Other Patient Education/Resources  24/7 [facility] Nurse Call Line   24/7 Nurse Call Line Education Method Verbal   Advanced Directives: --  [resources provided]          SDOH updated and reviewed with the patient during this program:  Financial Resource Strain: Low Risk  (5/21/2025)    Overall Financial Resource Strain (CARDIA)     Difficulty of Paying Living Expenses: Not very hard      --     Food Insecurity: No Food Insecurity (5/21/2025)    Hunger Vital Sign     Worried About Running Out of Food in the Last Year: Never true     Ran Out of Food in the Last Year: Never true      --     Transportation Needs: No Transportation Needs (5/21/2025)    PRAPARE - Transportation     Lack of Transportation (Medical): No     Lack of Transportation (Non-Medical): No         Anthony MAYNARD  Ambulatory Case Management    5/21/2025, 09:46 EDT

## 2025-05-22 ENCOUNTER — APPOINTMENT (OUTPATIENT)
Dept: CT IMAGING | Facility: HOSPITAL | Age: 75
End: 2025-05-22
Payer: MEDICARE

## 2025-05-22 ENCOUNTER — APPOINTMENT (OUTPATIENT)
Dept: GENERAL RADIOLOGY | Facility: HOSPITAL | Age: 75
End: 2025-05-22
Payer: MEDICARE

## 2025-05-22 ENCOUNTER — HOSPITAL ENCOUNTER (EMERGENCY)
Facility: HOSPITAL | Age: 75
Discharge: HOME | End: 2025-05-22
Attending: EMERGENCY MEDICINE
Payer: MEDICARE

## 2025-05-22 VITALS
HEART RATE: 63 BPM | RESPIRATION RATE: 18 BRPM | HEIGHT: 65 IN | BODY MASS INDEX: 29.27 KG/M2 | OXYGEN SATURATION: 96 % | TEMPERATURE: 98 F | SYSTOLIC BLOOD PRESSURE: 168 MMHG | WEIGHT: 175.71 LBS | DIASTOLIC BLOOD PRESSURE: 69 MMHG

## 2025-05-22 DIAGNOSIS — R44.3 HALLUCINATIONS: Primary | ICD-10-CM

## 2025-05-22 LAB
ALBUMIN SERPL-MCNC: 4.2 G/DL (ref 3.5–5.2)
ALBUMIN/GLOB SERPL: 1.6 G/DL
ALP SERPL-CCNC: 100 U/L (ref 39–117)
ALT SERPL W P-5'-P-CCNC: 12 U/L (ref 1–33)
ANION GAP SERPL CALCULATED.3IONS-SCNC: 9.9 MMOL/L (ref 5–15)
AST SERPL-CCNC: 18 U/L (ref 1–32)
BASOPHILS # BLD AUTO: 0.05 10*3/MM3 (ref 0–0.2)
BASOPHILS NFR BLD AUTO: 0.6 % (ref 0–1.5)
BILIRUB SERPL-MCNC: <0.2 MG/DL (ref 0–1.2)
BILIRUB UR QL STRIP: NEGATIVE
BUN SERPL-MCNC: 20 MG/DL (ref 8–23)
BUN/CREAT SERPL: 18 (ref 7–25)
CALCIUM SPEC-SCNC: 8.9 MG/DL (ref 8.6–10.5)
CHLORIDE SERPL-SCNC: 103 MMOL/L (ref 98–107)
CK SERPL-CCNC: 77 U/L (ref 20–180)
CLARITY UR: CLEAR
CO2 SERPL-SCNC: 25.1 MMOL/L (ref 22–29)
COLOR UR: YELLOW
CREAT SERPL-MCNC: 1.11 MG/DL (ref 0.57–1)
DEPRECATED RDW RBC AUTO: 43.2 FL (ref 37–54)
EGFRCR SERPLBLD CKD-EPI 2021: 51.9 ML/MIN/1.73
EOSINOPHIL # BLD AUTO: 0.59 10*3/MM3 (ref 0–0.4)
EOSINOPHIL NFR BLD AUTO: 7.2 % (ref 0.3–6.2)
ERYTHROCYTE [DISTWIDTH] IN BLOOD BY AUTOMATED COUNT: 12.9 % (ref 12.3–15.4)
GLOBULIN UR ELPH-MCNC: 2.7 GM/DL
GLUCOSE SERPL-MCNC: 164 MG/DL (ref 65–99)
GLUCOSE UR STRIP-MCNC: NEGATIVE MG/DL
HCT VFR BLD AUTO: 36 % (ref 34–46.6)
HGB BLD-MCNC: 11.2 G/DL (ref 12–15.9)
HGB UR QL STRIP.AUTO: NEGATIVE
IMM GRANULOCYTES # BLD AUTO: 0.06 10*3/MM3 (ref 0–0.05)
IMM GRANULOCYTES NFR BLD AUTO: 0.7 % (ref 0–0.5)
KETONES UR QL STRIP: NEGATIVE
LEUKOCYTE ESTERASE UR QL STRIP.AUTO: NEGATIVE
LYMPHOCYTES # BLD AUTO: 1.25 10*3/MM3 (ref 0.7–3.1)
LYMPHOCYTES NFR BLD AUTO: 15.3 % (ref 19.6–45.3)
MAGNESIUM SERPL-MCNC: 1.8 MG/DL (ref 1.6–2.4)
MCH RBC QN AUTO: 28.6 PG (ref 26.6–33)
MCHC RBC AUTO-ENTMCNC: 31.1 G/DL (ref 31.5–35.7)
MCV RBC AUTO: 92.1 FL (ref 79–97)
MONOCYTES # BLD AUTO: 0.5 10*3/MM3 (ref 0.1–0.9)
MONOCYTES NFR BLD AUTO: 6.1 % (ref 5–12)
NEUTROPHILS NFR BLD AUTO: 5.7 10*3/MM3 (ref 1.7–7)
NEUTROPHILS NFR BLD AUTO: 70.1 % (ref 42.7–76)
NITRITE UR QL STRIP: NEGATIVE
NRBC BLD AUTO-RTO: 0 /100 WBC (ref 0–0.2)
PH UR STRIP.AUTO: <=5 [PH] (ref 5–8)
PLATELET # BLD AUTO: 270 10*3/MM3 (ref 140–450)
PMV BLD AUTO: 9.5 FL (ref 6–12)
POTASSIUM SERPL-SCNC: 4.3 MMOL/L (ref 3.5–5.2)
PROT SERPL-MCNC: 6.9 G/DL (ref 6–8.5)
PROT UR QL STRIP: NEGATIVE
RBC # BLD AUTO: 3.91 10*6/MM3 (ref 3.77–5.28)
SODIUM SERPL-SCNC: 138 MMOL/L (ref 136–145)
SP GR UR STRIP: 1.02 (ref 1–1.03)
T4 FREE SERPL-MCNC: 0.79 NG/DL (ref 0.92–1.68)
TSH SERPL DL<=0.05 MIU/L-ACNC: 11.2 UIU/ML (ref 0.27–4.2)
UROBILINOGEN UR QL STRIP: NORMAL
WBC NRBC COR # BLD AUTO: 8.15 10*3/MM3 (ref 3.4–10.8)
WHOLE BLOOD HOLD COAG: NORMAL

## 2025-05-22 PROCEDURE — P9612 CATHETERIZE FOR URINE SPEC: HCPCS

## 2025-05-22 PROCEDURE — 84443 ASSAY THYROID STIM HORMONE: CPT | Performed by: EMERGENCY MEDICINE

## 2025-05-22 PROCEDURE — 70450 CT HEAD/BRAIN W/O DYE: CPT

## 2025-05-22 PROCEDURE — 83735 ASSAY OF MAGNESIUM: CPT | Performed by: EMERGENCY MEDICINE

## 2025-05-22 PROCEDURE — 81003 URINALYSIS AUTO W/O SCOPE: CPT | Performed by: EMERGENCY MEDICINE

## 2025-05-22 PROCEDURE — 71045 X-RAY EXAM CHEST 1 VIEW: CPT

## 2025-05-22 PROCEDURE — 82550 ASSAY OF CK (CPK): CPT | Performed by: EMERGENCY MEDICINE

## 2025-05-22 PROCEDURE — 70486 CT MAXILLOFACIAL W/O DYE: CPT

## 2025-05-22 PROCEDURE — 99284 EMERGENCY DEPT VISIT MOD MDM: CPT

## 2025-05-22 PROCEDURE — 85025 COMPLETE CBC W/AUTO DIFF WBC: CPT | Performed by: EMERGENCY MEDICINE

## 2025-05-22 PROCEDURE — 80053 COMPREHEN METABOLIC PANEL: CPT | Performed by: EMERGENCY MEDICINE

## 2025-05-22 PROCEDURE — 84439 ASSAY OF FREE THYROXINE: CPT | Performed by: EMERGENCY MEDICINE

## 2025-05-22 PROCEDURE — 25810000003 LACTATED RINGERS SOLUTION: Performed by: EMERGENCY MEDICINE

## 2025-05-22 RX ORDER — SODIUM CHLORIDE 0.9 % (FLUSH) 0.9 %
10 SYRINGE (ML) INJECTION AS NEEDED
Status: DISCONTINUED | OUTPATIENT
Start: 2025-05-22 | End: 2025-05-22 | Stop reason: HOSPADM

## 2025-05-22 RX ADMIN — SODIUM CHLORIDE, SODIUM LACTATE, POTASSIUM CHLORIDE, CALCIUM CHLORIDE 500 ML: 20; 30; 600; 310 INJECTION, SOLUTION INTRAVENOUS at 20:18

## 2025-05-23 RX ORDER — LEVOTHYROXINE SODIUM 100 UG/1
100 TABLET ORAL DAILY
Qty: 30 TABLET | Refills: 5 | OUTPATIENT
Start: 2025-05-23

## 2025-05-23 NOTE — DISCHARGE INSTRUCTIONS
Call your primary care doctor tomorrow.  Return for vomiting fever speech difficulty weakness to one-sided the other chest pain neck, jaw pain increasing confusion increasing hallucinations unable to urinate or any other new or worsening problems or concerns return immediately to ER  Thyroid numbers are off just a little bit please follow this up with your doctor.

## 2025-05-23 NOTE — ED PROVIDER NOTES
Subjective   History of Present Illness  Chief complaint per family hallucinations possible UTI    History of present illness a 74-year-old female has some dementia and multiple health problems who fell on  she hit her head.  She was seen at the freestanding ER she had a CT and had some blood work and a urine.  There was thought that she may have the beginnings of a UTI and she was placed on some antibiotics.  This was Keflex.  Family brings her in because she is fallen again but she did not hit her head she has been little bit weak and having hallucinations.  She is seeing people that have not been around they have been  for several years.  Patient denies any specific complaints of pain or swelling no headache or vision change speech difficulty or paralysis to one-sided the other there is no chest pain shortness of breath fever chills or night sweats.  She denies dysuria frequency or abdominal pain.  No other change in medications other than the Keflex.      Review of Systems   Constitutional:  Negative for chills and fever.   HENT:  Negative for congestion.    Respiratory:  Negative for chest tightness and shortness of breath.    Cardiovascular:  Negative for chest pain and palpitations.   Gastrointestinal:  Negative for abdominal pain, blood in stool and vomiting.   Genitourinary:  Negative for difficulty urinating and dysuria.   Musculoskeletal:  Negative for back pain and neck pain.   Neurological:  Negative for facial asymmetry, speech difficulty and headaches.   Psychiatric/Behavioral:  Positive for hallucinations.        Past Medical History:   Diagnosis Date    Anxiety     Arthritis 1998    B12 deficiency 2016    Last Assessment & Plan:  Formatting of this note might be different from the original.  Compliant and controlled with current medication B 12 supplements . Lab today B 12    Back pain, chronic 2016    Last Assessment & Plan:  Formatting of this note might be different from  the original. Will refer to PT    Cancer 2009    Colon    Colon polyp 2005    Colon Cancer    Coronary artery disease involving native heart without angina pectoris 06/02/2022    Dementia 2021    Doctor prescribed medicine for dementia/memory loss    Depression 07/08/2016    Last Assessment & Plan:  Formatting of this note might be different from the original.  Compliant and controlled with current medication    Diabetes mellitus 06/02/2022    Diabetic peripheral neuropathy 02/14/2020    Last Assessment & Plan:  Formatting of this note might be different from the original. Stable    Difficulty walking 2022    Extreme unsteadiness on feet    DM type 2, goal HbA1c < 7% 03/27/2017    Last Assessment & Plan:  Formatting of this note might be different from the original.  Compliant and controlled with current diet.llabs today A1c    Dyslipidemia 10/11/2017    Last Assessment & Plan:  Formatting of this note might be different from the original.  Compliant and controlled with current medication/statin therapy/labs today    Environmental and seasonal allergies 10/11/2019    Gastric reflux 03/27/2017    HTN, goal below 130/80 02/10/2016    Last Assessment & Plan:  Formatting of this note might be different from the original. Compliant and controlled with current medication ramipril /labs today cmp to check renal function and electrolytes    Hyperlipidemia     Hypothyroidism 1990    Last Assessment & Plan:  Formatting of this note might be different from the original. TSH low reduce levothyroxine.  Will check TSH today.    Insomnia 01/16/2015    Lumbosacral radiculopathy 02/14/2020    Last Assessment & Plan:  Formatting of this note might be different from the original.  Compliant and controlled with current medication Muscle relaxer    Memory loss 2020    Osteoporosis 05/26/2020    Formatting of this note is different from the original. RADIOLOGY REPORT   FACILITY:  [facility] UNIT/AGE/GENDER: [id number]  OP       AGE:69 Y          SEX:F PATIENT NAME/:  GM DERAS    1950 UNIT NUMBER:  [id number] ACCOUNT NUMBER:  [id number] ACCESSION NUMBER:  [id number]     EXAMINATION: Bone densitometry of multiple sites, lumbar spine, left hip and distal    Restless leg syndrome 2018    Last Assessment & Plan:  Formatting of this note might be different from the original.  Compliant and controlled with current medication requip    Shortness of breath     Sleep apnea     Not wearing CPAP    TIA (transient ischemic attack) 2024       No Known Allergies    Past Surgical History:   Procedure Laterality Date    BACK SURGERY  2007    lower    CARDIAC CATHETERIZATION Right 04/15/2022    Procedure: Left Heart Cath;  Surgeon: Laila Alvarez MD;  Location: [facility] CATH INVASIVE LOCATION;  Service: Cardiovascular;  Laterality: Right;    CARDIAC CATHETERIZATION  4/15/2022    COLON SURGERY      REPLACEMENT TOTAL KNEE Left     redone in     REPLACEMENT TOTAL KNEE Right        Family History   Problem Relation Age of Onset    Hypertension Mother     Arthritis Mother     Colon cancer Mother     Thyroid disease Mother     Stroke Mother     Cancer Mother         Colon    Dementia Mother     Heart disease Sister     Heart disease Sister     Dementia Maternal Aunt     Dementia Maternal Aunt        Social History     Socioeconomic History    Marital status:    Tobacco Use    Smoking status: Former     Current packs/day: 0.00     Average packs/day: 2.0 packs/day for 20.0 years (40.0 ttl pk-yrs)     Types: Cigarettes     Start date: 1978     Quit date: 1998     Years since quittin.4    Smokeless tobacco: Never   Vaping Use    Vaping status: Never Used   Substance and Sexual Activity    Alcohol use: Yes     Comment: socially    Drug use: Never     Comment: CBD oils    Sexual activity: Not Currently     Partners: Male     Birth control/protection: Post-menopausal, None     Prior  to Admission medications    Medication Sig Start Date End Date Taking? Authorizing Provider   albuterol sulfate  (90 Base) MCG/ACT inhaler Inhale 2 puffs Every 4 (Four) Hours As Needed for Wheezing or Shortness of Air. 7/5/24   Michell Ramirez APRN   aspirin 81 MG EC tablet Take 1 tablet by mouth Daily.    ProviderFlorin MD   budesonide-formoterol (SYMBICORT) 160-4.5 MCG/ACT inhaler INHALE 2 PUFFS BY MOUTH TWICE A DAY 10/16/24   Michell Ramirez APRN   buPROPion (WELLBUTRIN) 100 MG tablet TAKE 1 TABLET BY MOUTH 2 TIMES A DAY 7/16/24   Michell Ramirez APRN   CBD (cannabidiol) oral oil Take 1 drop by mouth Daily As Needed.    ProviderFlorin MD   cephalexin (KEFLEX) 500 MG capsule Take 1 capsule by mouth 2 (Two) Times a Day. 5/18/25   Yola Lerma MD   cloNIDine (CATAPRES) 0.2 MG tablet TAKE 1 TABLET BY MOUTH DAILY 3/20/25   Michell Ramirez APRN   clopidogrel (PLAVIX) 75 MG tablet Take 1 tablet by mouth Daily. 2/4/25   Michell Ramirez APRN   docusate sodium (Colace) 100 MG capsule Take 1 capsule by mouth 2 (Two) Times a Day. 3/11/25   Michell Ramirez APRN   donepezil (ARICEPT) 10 MG tablet Take 1 tablet by mouth Every Night. 4/22/24   Seipel, Joseph F, MD   gabapentin (NEURONTIN) 300 MG capsule Take 1 capsule by mouth 3 (Three) Times a Day. 5/19/25   Michell Ramirez APRN   hydrOXYzine (ATARAX) 10 MG tablet TAKE 1 TABLET BY MOUTH DAILY 10/31/24   Michell Ramirez APRN   levothyroxine (SYNTHROID, LEVOTHROID) 100 MCG tablet Take 1 tablet by mouth Daily. 2/26/24   Florin Parker MD   meclizine (ANTIVERT) 25 MG tablet Take 1 tablet by mouth 3 (Three) Times a Day As Needed for Dizziness. 11/7/24   Michell Ramirez APRN   memantine (NAMENDA) 10 MG tablet TAKE 1 TABLET BY MOUTH 2 TIMES A DAY 5/7/25   Michell Ramirez APRN   metFORMIN (GLUCOPHAGE) 1000 MG tablet TAKE 1 TABLET BY MOUTH DAILY WITH BREAKFAST 7/16/24   Michell Ramirez APRN    omeprazole (priLOSEC) 40 MG capsule Take 1 capsule by mouth Daily. 5/19/25   Michell Ramirez APRN   pravastatin (PRAVACHOL) 20 MG tablet TAKE 1 TABLET BY MOUTH DAILY 5/7/25   Michell Ramirez APRN   ramipril (ALTACE) 2.5 MG capsule TAKE 2 CAPSULES BY MOUTH DAILY 5/7/25   Michell Ramirez APRN   rOPINIRole (REQUIP) 0.5 MG tablet TAKE 1 TABLET BY MOUTH EVERY NIGHT AT BEDTIME 1 HOUR BEFORE BEDTIME 7/11/24   Michell Ramirez APRN   venlafaxine XR (EFFEXOR-XR) 150 MG 24 hr capsule Take 1 capsule by mouth Daily. 7/16/24   Michell Ramirez APRN   vitamin B-12 (CYANOCOBALAMIN) 1000 MCG tablet Take 1 tablet by mouth Daily.    Provider, MD Florin   Vitamin D, Cholecalciferol, 50 MCG (2000 UT) capsule Take 1 capsule by mouth Daily.    Provider, MD Florin          Objective   Physical Exam  Constitutional 74-year-old female awake alert no acute distress triage vital signs reviewed.  HEENT large resolving hematoma right side of the face superficial wound just above the right eyebrow.  Extraocular muscles are intact pupils equal round react there is no photophobia.  There is a subconjunctival hemorrhage but there is no hyphema the pupil rate normal.  Vision is intact.  The patient has mild tenderness to the infraorbital area but no step-off or deformity no infraorbital anesthesia all extraocular muscles are intact midface stable lower jaw stable good occlusion no mandible tenderness is noted.  No raccoon or Savage sign.  Back no cervical thoracic lumbar spine tenderness trachea midline no JVD no bruits lungs were clear no retraction no use of accessories heart was regular without murmur rub abdomen soft no tenderness no pulsatile masses good bowel sounds extremities pulses equal upper and lower extremities no edema cords or Homans' sign several skin tears to the left forearm.  Neurologic she is awake alert she knows her name she knows where she is she knows her family there is no facial asymmetry  otherwise speech is normal no drift the arms or legs [place] Coma Scale 15  Procedures           ED Course      Results for orders placed or performed during the hospital encounter of 05/22/25   Comprehensive Metabolic Panel    Collection Time: 05/22/25  7:03 PM    Specimen: Blood   Result Value Ref Range    Glucose 164 (H) 65 - 99 mg/dL    BUN 20 8 - 23 mg/dL    Creatinine 1.11 (H) 0.57 - 1.00 mg/dL    Sodium 138 136 - 145 mmol/L    Potassium 4.3 3.5 - 5.2 mmol/L    Chloride 103 98 - 107 mmol/L    CO2 25.1 22.0 - 29.0 mmol/L    Calcium 8.9 8.6 - 10.5 mg/dL    Total Protein 6.9 6.0 - 8.5 g/dL    Albumin 4.2 3.5 - 5.2 g/dL    ALT (SGPT) 12 1 - 33 U/L    AST (SGOT) 18 1 - 32 U/L    Alkaline Phosphatase 100 39 - 117 U/L    Total Bilirubin <0.2 0.0 - 1.2 mg/dL    Globulin 2.7 gm/dL    A/G Ratio 1.6 g/dL    BUN/Creatinine Ratio 18.0 7.0 - 25.0    Anion Gap 9.9 5.0 - 15.0 mmol/L    eGFR 51.9 (L) >60.0 mL/min/1.73   TSH Rfx On Abnormal To Free T4    Collection Time: 05/22/25  7:03 PM    Specimen: Blood   Result Value Ref Range    TSH 11.200 (H) 0.270 - 4.200 uIU/mL   Magnesium    Collection Time: 05/22/25  7:03 PM    Specimen: Blood   Result Value Ref Range    Magnesium 1.8 1.6 - 2.4 mg/dL   CK    Collection Time: 05/22/25  7:03 PM    Specimen: Blood   Result Value Ref Range    Creatine Kinase 77 20 - 180 U/L   CBC Auto Differential    Collection Time: 05/22/25  7:03 PM    Specimen: Blood   Result Value Ref Range    WBC 8.15 3.40 - 10.80 10*3/mm3    RBC 3.91 3.77 - 5.28 10*6/mm3    Hemoglobin 11.2 (L) 12.0 - 15.9 g/dL    Hematocrit 36.0 34.0 - 46.6 %    MCV 92.1 79.0 - 97.0 fL    MCH 28.6 26.6 - 33.0 pg    MCHC 31.1 (L) 31.5 - 35.7 g/dL    RDW 12.9 12.3 - 15.4 %    RDW-SD 43.2 37.0 - 54.0 fl    MPV 9.5 6.0 - 12.0 fL    Platelets 270 140 - 450 10*3/mm3    Neutrophil % 70.1 42.7 - 76.0 %    Lymphocyte % 15.3 (L) 19.6 - 45.3 %    Monocyte % 6.1 5.0 - 12.0 %    Eosinophil % 7.2 (H) 0.3 - 6.2 %    Basophil % 0.6 0.0 - 1.5 %     Immature Grans % 0.7 (H) 0.0 - 0.5 %    Neutrophils, Absolute 5.70 1.70 - 7.00 10*3/mm3    Lymphocytes, Absolute 1.25 0.70 - 3.10 10*3/mm3    Monocytes, Absolute 0.50 0.10 - 0.90 10*3/mm3    Eosinophils, Absolute 0.59 (H) 0.00 - 0.40 10*3/mm3    Basophils, Absolute 0.05 0.00 - 0.20 10*3/mm3    Immature Grans, Absolute 0.06 (H) 0.00 - 0.05 10*3/mm3    nRBC 0.0 0.0 - 0.2 /100 WBC   T4, Free    Collection Time: 05/22/25  7:03 PM    Specimen: Blood   Result Value Ref Range    Free T4 0.79 (L) 0.92 - 1.68 ng/dL   Light Blue Top    Collection Time: 05/22/25  7:03 PM   Result Value Ref Range    Extra Tube Hold for add-ons.    Urinalysis With Culture If Indicated - Urine, Catheter    Collection Time: 05/22/25  7:24 PM    Specimen: Urine, Catheter   Result Value Ref Range    Color, UA Yellow Yellow, Straw    Appearance, UA Clear Clear    pH, UA <=5.0 5.0 - 8.0    Specific Gravity, UA 1.019 1.005 - 1.030    Glucose, UA Negative Negative    Ketones, UA Negative Negative    Bilirubin, UA Negative Negative    Blood, UA Negative Negative    Protein, UA Negative Negative    Leuk Esterase, UA Negative Negative    Nitrite, UA Negative Negative    Urobilinogen, UA 0.2 E.U./dL 0.2 - 1.0 E.U./dL     XR Chest 1 View  Result Date: 5/22/2025  No active disease. Electronically Signed: Florencio Rodriguez MD  5/22/2025 8:55 PM EDT  Workstation ID: [id number]    CT Head Without Contrast  Result Date: 5/22/2025  Impression: 1.No acute intracranial findings. 2.Right frontal scalp hematoma with overlying laceration. Electronically Signed: Terence Esquivel DO  5/22/2025 8:31 PM EDT  Workstation ID: [id number]    CT Facial Bones Without Contrast  Result Date: 5/22/2025  Impression: 1.No acute intracranial findings. 2.Right frontal scalp hematoma with overlying laceration. Electronically Signed: Terence Esquivel DO  5/22/2025 8:31 PM EDT  Workstation ID: [id number]    Medications   lactated ringers bolus 500 mL (0 mL Intravenous Stopped 5/22/25 2058)                                                         Medical Decision Making  Medical decision making.  IV established given 500 cc lactated Ringer's.  The patient underwent a chest x-ray independent reviewed by me no pneumonia pneumothorax failure acute findings radiology review unremarkable CT scan of the head and facial bones my independent review no tumors masses hemorrhage or fractures radiology review the same.  Urine here was negative.  Labs obtained by independent review comprehensive metabolic profile blood sugar 164 creatinine 1.1 magnesium 1.8 TSH 11.2 CK 77 CBC unremarkable hemoglobin 11.7 Free T40.79.  The patient had a record review and the CT reviewed on Sunday was reported as negative as well.  The patient repeat exam is resting comfortably I do not see any evidence of an acute stroke or meningitis or encephalitis or any cerebral hemorrhage or fracture or any other spine injury or abdominal process or cardiac issue or sepsis or bacteremia based on the history and physical and clinical findings although not a complete list of all possibilities we did talk about the thyroid.  We talked about admission to the hospital for further workup but patient would rather go home and family is comfortable taking her home and I think that is reasonable.  We talked about what to return for she will follow with her doctor tomorrow concerning her thyroid and recheck stable otherwise unremarkable ER course.    Problems Addressed:  Hallucinations: complicated acute illness or injury    Amount and/or Complexity of Data Reviewed  External Data Reviewed: radiology.  Labs: ordered. Decision-making details documented in ED Course.  Radiology: ordered and independent interpretation performed. Decision-making details documented in ED Course.    Risk  Prescription drug management.        Final diagnoses:   Hallucinations       ED Disposition  ED Disposition       ED Disposition   Discharge    Condition   Stable    Comment    --               Michell Ramirez, APRN  [address] [address]  [address]  [place] [facility]  [phone]    In 1 day           Medication List      No changes were made to your prescriptions during this visit.            Kayode Tello MD  05/23/25 1117

## 2025-05-27 ENCOUNTER — PATIENT OUTREACH (OUTPATIENT)
Age: 75
End: 2025-05-27
Payer: MEDICARE

## 2025-05-27 RX ORDER — LEVOTHYROXINE SODIUM 100 UG/1
100 TABLET ORAL DAILY
Qty: 90 TABLET | Refills: 0 | Status: SHIPPED | OUTPATIENT
Start: 2025-05-27 | End: 2025-05-28 | Stop reason: SDUPTHER

## 2025-05-27 NOTE — OUTREACH NOTE
SW received referral via RN-ACM re: SDOH needs SW attempted to reach patient daughter as that is primary number listed in demographics. No Answer. LVM. Next outreach scheduled x 2 days.     Ghazal BRUCE -   Ambulatory Case Management    5/27/2025, 15:10 EDT

## 2025-05-28 RX ORDER — LEVOTHYROXINE SODIUM 112 UG/1
112 TABLET ORAL DAILY
Qty: 90 TABLET | Refills: 0 | Status: SHIPPED | OUTPATIENT
Start: 2025-05-28

## 2025-05-30 ENCOUNTER — PATIENT OUTREACH (OUTPATIENT)
Age: 75
End: 2025-05-30
Payer: MEDICARE

## 2025-05-30 ENCOUNTER — OFFICE VISIT (OUTPATIENT)
Dept: FAMILY MEDICINE CLINIC | Facility: CLINIC | Age: 75
End: 2025-05-30
Payer: MEDICARE

## 2025-05-30 VITALS
BODY MASS INDEX: 28.99 KG/M2 | DIASTOLIC BLOOD PRESSURE: 80 MMHG | HEIGHT: 65 IN | WEIGHT: 174 LBS | SYSTOLIC BLOOD PRESSURE: 142 MMHG | OXYGEN SATURATION: 94 % | HEART RATE: 70 BPM

## 2025-05-30 DIAGNOSIS — R29.6 FREQUENT FALLS: ICD-10-CM

## 2025-05-30 DIAGNOSIS — R42 DIZZINESS: Primary | ICD-10-CM

## 2025-05-30 PROCEDURE — 1125F AMNT PAIN NOTED PAIN PRSNT: CPT | Performed by: NURSE PRACTITIONER

## 2025-05-30 PROCEDURE — 1160F RVW MEDS BY RX/DR IN RCRD: CPT | Performed by: NURSE PRACTITIONER

## 2025-05-30 PROCEDURE — 3044F HG A1C LEVEL LT 7.0%: CPT | Performed by: NURSE PRACTITIONER

## 2025-05-30 PROCEDURE — G2211 COMPLEX E/M VISIT ADD ON: HCPCS | Performed by: NURSE PRACTITIONER

## 2025-05-30 PROCEDURE — 99214 OFFICE O/P EST MOD 30 MIN: CPT | Performed by: NURSE PRACTITIONER

## 2025-05-30 PROCEDURE — 3079F DIAST BP 80-89 MM HG: CPT | Performed by: NURSE PRACTITIONER

## 2025-05-30 PROCEDURE — 3077F SYST BP >= 140 MM HG: CPT | Performed by: NURSE PRACTITIONER

## 2025-05-30 PROCEDURE — 1159F MED LIST DOCD IN RCRD: CPT | Performed by: NURSE PRACTITIONER

## 2025-05-30 RX ORDER — CIPROFLOXACIN 250 MG/1
TABLET, FILM COATED ORAL
COMMUNITY
Start: 2025-05-07

## 2025-05-30 NOTE — PROGRESS NOTES
"Chief Complaint  Follow-up (ER follow up from [facility] )    Subjective        Radha Barbour presents to [facility]  History of Present Illness    Patient presents for follow-up visit.  She was seen at [facility] on May 18 and May 22.  On the  patient presented with complaints of fall and right frontal hematoma with a skin tear to the left hand.  Patient described getting out of her car, turned suddenly which made her feel dizzy which caused her to become unbalanced and fell forward hitting her head on the pavement.  Labs and urine were unremarkable.  CT scan of head showed left frontal scalp hematoma without acute abnormality.  He was found to be mildly dehydrated.  Patient was discharged on Keflex due for possible UTI and advised to follow-up with PCP.  Patient returned to ER May 22 with complaints of a another fall with hallucinations.  Family described the patient was seeing people that have not been around or  for years.  With a right frontal scalp hematoma.  CT face also unremarkable as well as chest x-ray.  Urinalysis was negative.  Patient was discussed but deferred by patient. She has had two falls since the ER stay due to vertigo which persists. Patient is taking Meclizine BID.     Objective   Vital Signs:  /80 (BP Location: Left arm, Patient Position: Sitting, Cuff Size: Adult)   Pulse 70   Ht 165.1 cm (65\")   Wt 78.9 kg (174 lb)   SpO2 94%   BMI 28.96 kg/m²   Estimated body mass index is 28.96 kg/m² as calculated from the following:    Height as of this encounter: 165.1 cm (65\").    Weight as of this encounter: 78.9 kg (174 lb).          Physical Exam  Constitutional:       Appearance: Normal appearance.   HENT:      Head: Normocephalic.   Cardiovascular:      Rate and Rhythm: Normal rate and regular rhythm.   Pulmonary:      Effort: Pulmonary effort is normal.      Breath sounds: Normal breath sounds.   Abdominal:      General: Abdomen is " flat. Bowel sounds are normal.      Palpations: Abdomen is soft.   Musculoskeletal:         General: Normal range of motion.      Cervical back: Neck supple.      Right lower leg: No edema.      Left lower leg: No edema.   Skin:     General: Skin is warm and dry.   Neurological:      Mental Status: She is alert and oriented to person, place, and time.      Gait: Gait is intact.   Psychiatric:         Attention and Perception: Attention normal.         Mood and Affect: Mood normal.         Speech: Speech normal.        Result Review :    CMP          3/11/2025    14:29 5/18/2025    17:15 5/22/2025    19:03   CMP   Glucose 87  153  164    BUN 13  19  20    Creatinine 0.87  1.81  1.11    EGFR 69.6  28.9  51.9    Sodium 142  137  138    Potassium 4.3  4.2  4.3    Chloride 101  100  103    Calcium 8.9  8.9  8.9    Total Protein 6.9  6.7  6.9    Albumin 3.7  4.1  4.2    Globulin 3.2  2.6  2.7    Total Bilirubin <0.2  0.2  <0.2    Alkaline Phosphatase 100  98  100    AST (SGOT) 16  16  18    ALT (SGPT) 11  12  12    Albumin/Globulin Ratio 1.2  1.6  1.6    BUN/Creatinine Ratio 14.9  10.5  18.0    Anion Gap 12.0  13.9  9.9      CBC          3/11/2025    14:29 5/18/2025    17:15 5/22/2025    19:03   CBC   WBC 7.25  10.17  8.15    RBC 3.73  3.93  3.91    Hemoglobin 11.0  11.2  11.2    Hematocrit 34.7  36.0  36.0    MCV 93.0  91.6  92.1    MCH 29.5  28.5  28.6    MCHC 31.7  31.1  31.1    RDW 13.2  13.0  12.9    Platelets 332  288  270      Lipid Panel          7/5/2024    14:38   Lipid Panel   Total Cholesterol 175    Triglycerides 126    HDL Cholesterol 95    VLDL Cholesterol 21    LDL Cholesterol  59    LDL/HDL Ratio 0.58      TSH          7/5/2024    14:38 3/11/2025    14:29 5/22/2025    19:03   TSH   TSH 0.741  17.000  11.200      Most Recent A1C          3/11/2025    14:29   HGBA1C Most Recent   Hemoglobin A1C 6.80      UA          2/16/2025    21:00 5/18/2025    18:06 5/22/2025    19:24   Urinalysis   Squamous Epithelial  Cells, UA 3-6      Specific Gravity, UA 1.015  >=1.030  1.019    Ketones, UA Trace  15 mg/dL (1+)  Negative    Blood, UA Large (3+)  Trace  Negative    Leukocytes, UA Large (3+)  Trace  Negative    Nitrite, UA Negative  Negative  Negative    RBC, UA Too Numerous to Count      WBC, UA Too Numerous to Count      Bacteria, UA 4+        Data reviewed : Radiologic studies CT head/CT face and Recent hospitalization notes [facility]          Assessment and Plan   Diagnoses and all orders for this visit:    1. Dizziness (Primary)    2. Frequent falls    Reviewed hospitalization visit notes  Recommended physical therapy and/or vestibular therapy but patient defers  Patient is to walk with her walker at all times  Meclizine as needed, up to 3 times daily for dizziness and/or vertigo  Discussed referral to ENT but she declined  Patient will follow-up as scheduled call with new or worsening concerns       I spent 30 minutes caring for Radha on this date of service. This time includes time spent by me in the following activities:preparing for the visit, reviewing tests, obtaining and/or reviewing a separately obtained history, performing a medically appropriate examination and/or evaluation , counseling and educating the patient/family/caregiver, ordering medications, tests, or procedures, documenting information in the medical record, and care coordination  Follow Up   Return for Next scheduled follow up.  Patient was given instructions and counseling regarding her condition or for health maintenance advice. Please see specific information pulled into the AVS if appropriate.

## 2025-05-30 NOTE — OUTREACH NOTE
Social Work Assessment  Questions/Answers      Flowsheet Row Most Recent Value   Referral Source outpatient staff, outpatient clinic, nursing   Reason for Consult financial concerns   Preferred Language English   Advance Care Planning Reviewed no concerns identified   People in Home sibling(s)   Current Living Arrangements home  [lives w sister]   Potentially Unsafe Housing Conditions none   In the past 12 months has the electric, gas, oil, or water company threatened to shut off services in your home? No   Primary Care Provided by self   Provides Primary Care For no one, unable/limited ability to care for self   Family Caregiver if Needed child(jesus), adult, sibling(s)   Quality of Family Relationships helpful, involved, supportive   Able to Return to Prior Arrangements yes   Employment Status retired   Source of Income social security   Financial/Environmental Concerns other (see comments)  [past due medical bills]   Medications independent   Meal Preparation independent   Housekeeping independent   Laundry independent   Shopping assistive person  [daughter]   If for any reason you need help with day-to-day activities such as bathing, preparing meals, shopping, managing finances, etc., do you get the help you need? I get all the help I need          SDOH updated and reviewed with the patient during this program:  Financial Resource Strain: Medium Risk (5/21/2025)    Overall Financial Resource Strain (CARDIA)     Difficulty of Paying Living Expenses: Somewhat hard      --     Food Insecurity: No Food Insecurity (5/21/2025)    Hunger Vital Sign     Worried About Running Out of Food in the Last Year: Never true     Ran Out of Food in the Last Year: Never true      --     Housing Stability: Not At Risk (5/30/2025)    Housing Stability     Current Living Arrangements: home     Potentially Unsafe Housing Conditions: none      --     Transportation Needs: No Transportation Needs (5/21/2025)    PRAPARE - Transportation      Lack of Transportation (Medical): No     Lack of Transportation (Non-Medical): No      Patient Outreach    SW received referral via RN-ACM re: financial concerns. Sw called and spoke to patient daughter as that is main number listed in demographics. Daughter reports patient lives with sister. Daughter provides transportation, as needed. Daughter reports financial concerns related to medical bills. Sw provided information on  financial counselors to patient Amy. No additional financial concerns related to household bills or food insecurity noted, at this time. This SW to continue to monitor x 30 days to ensure no additional needs arise prior to DC.    Continuing Care   [facility] [employer] [facility]    [address] [place] [address]    Phone: [phone]    Request Status: Pending - No Request Sent    Services: Financial Assistance    Resource for: Financial Resource [place], Utilities     Ghazal BRUCE -   Ambulatory Case Management    5/30/2025, 14:08 EDT

## 2025-06-12 ENCOUNTER — LAB (OUTPATIENT)
Dept: FAMILY MEDICINE CLINIC | Facility: CLINIC | Age: 75
End: 2025-06-12
Payer: MEDICARE

## 2025-06-12 ENCOUNTER — OFFICE VISIT (OUTPATIENT)
Dept: FAMILY MEDICINE CLINIC | Facility: CLINIC | Age: 75
End: 2025-06-12
Payer: MEDICARE

## 2025-06-12 VITALS
DIASTOLIC BLOOD PRESSURE: 70 MMHG | OXYGEN SATURATION: 95 % | WEIGHT: 176 LBS | BODY MASS INDEX: 29.32 KG/M2 | HEART RATE: 59 BPM | SYSTOLIC BLOOD PRESSURE: 118 MMHG | HEIGHT: 65 IN

## 2025-06-12 DIAGNOSIS — N39.0 RECURRENT UTI: ICD-10-CM

## 2025-06-12 DIAGNOSIS — G89.29 CHRONIC LEFT-SIDED LOW BACK PAIN WITH LEFT-SIDED SCIATICA: ICD-10-CM

## 2025-06-12 DIAGNOSIS — E03.9 ACQUIRED HYPOTHYROIDISM: ICD-10-CM

## 2025-06-12 DIAGNOSIS — K59.00 CONSTIPATION, UNSPECIFIED CONSTIPATION TYPE: ICD-10-CM

## 2025-06-12 DIAGNOSIS — R41.89 COGNITIVE IMPAIRMENT: ICD-10-CM

## 2025-06-12 DIAGNOSIS — F32.A ANXIETY AND DEPRESSION: ICD-10-CM

## 2025-06-12 DIAGNOSIS — G25.81 RESTLESS LEG SYNDROME: ICD-10-CM

## 2025-06-12 DIAGNOSIS — Z78.0 POSTMENOPAUSE: ICD-10-CM

## 2025-06-12 DIAGNOSIS — M54.42 CHRONIC LEFT-SIDED LOW BACK PAIN WITH LEFT-SIDED SCIATICA: ICD-10-CM

## 2025-06-12 DIAGNOSIS — F41.9 ANXIETY AND DEPRESSION: ICD-10-CM

## 2025-06-12 DIAGNOSIS — J44.9 CHRONIC OBSTRUCTIVE PULMONARY DISEASE, UNSPECIFIED COPD TYPE: ICD-10-CM

## 2025-06-12 DIAGNOSIS — E11.69 TYPE 2 DIABETES MELLITUS WITH OTHER SPECIFIED COMPLICATION, WITHOUT LONG-TERM CURRENT USE OF INSULIN: ICD-10-CM

## 2025-06-12 DIAGNOSIS — I10 PRIMARY HYPERTENSION: ICD-10-CM

## 2025-06-12 DIAGNOSIS — Z00.00 ENCOUNTER FOR SUBSEQUENT ANNUAL WELLNESS VISIT (AWV) IN MEDICARE PATIENT: Primary | ICD-10-CM

## 2025-06-12 LAB
BILIRUB BLD-MCNC: NEGATIVE MG/DL
CLARITY, POC: CLEAR
COLOR UR: YELLOW
GLUCOSE UR STRIP-MCNC: NEGATIVE MG/DL
KETONES UR QL: NEGATIVE
LEUKOCYTE EST, POC: NEGATIVE
NITRITE UR-MCNC: NEGATIVE MG/ML
PH UR: 5 [PH] (ref 5–8)
PROT UR STRIP-MCNC: NEGATIVE MG/DL
RBC # UR STRIP: NEGATIVE /UL
SP GR UR: 1.03 (ref 1–1.03)
UROBILINOGEN UR QL: NORMAL

## 2025-06-12 PROCEDURE — 36415 COLL VENOUS BLD VENIPUNCTURE: CPT | Performed by: NURSE PRACTITIONER

## 2025-06-12 PROCEDURE — 83036 HEMOGLOBIN GLYCOSYLATED A1C: CPT | Performed by: NURSE PRACTITIONER

## 2025-06-12 PROCEDURE — 80048 BASIC METABOLIC PNL TOTAL CA: CPT | Performed by: NURSE PRACTITIONER

## 2025-06-12 PROCEDURE — 84443 ASSAY THYROID STIM HORMONE: CPT | Performed by: NURSE PRACTITIONER

## 2025-06-12 PROCEDURE — 85027 COMPLETE CBC AUTOMATED: CPT | Performed by: NURSE PRACTITIONER

## 2025-06-12 PROCEDURE — 82570 ASSAY OF URINE CREATININE: CPT | Performed by: NURSE PRACTITIONER

## 2025-06-12 PROCEDURE — 84439 ASSAY OF FREE THYROXINE: CPT | Performed by: NURSE PRACTITIONER

## 2025-06-12 PROCEDURE — 82043 UR ALBUMIN QUANTITATIVE: CPT | Performed by: NURSE PRACTITIONER

## 2025-06-12 RX ORDER — DOCUSATE SODIUM 100 MG/1
100 CAPSULE, LIQUID FILLED ORAL 2 TIMES DAILY
Qty: 60 CAPSULE | Refills: 2 | Status: SHIPPED | OUTPATIENT
Start: 2025-06-12

## 2025-06-12 RX ORDER — CIPROFLOXACIN 250 MG/1
250 TABLET, FILM COATED ORAL DAILY
Qty: 30 TABLET | Refills: 1 | Status: SHIPPED | OUTPATIENT
Start: 2025-06-12

## 2025-06-12 RX ORDER — CIPROFLOXACIN 250 MG/1
250 TABLET, FILM COATED ORAL DAILY
COMMUNITY
End: 2025-06-12 | Stop reason: SDUPTHER

## 2025-06-12 RX ORDER — CLOPIDOGREL BISULFATE 75 MG/1
75 TABLET ORAL DAILY
Qty: 30 TABLET | Refills: 3 | Status: SHIPPED | OUTPATIENT
Start: 2025-06-12

## 2025-06-12 NOTE — PROGRESS NOTES
Subjective   The ABCs of the Annual Wellness Visit  Medicare Wellness Visit      Radha Barbour is a 74 y.o. patient who presents for a Medicare Wellness Visit.    The following portions of the patient's history were reviewed and   updated as appropriate: allergies, current medications, past family history, past medical history, past social history, past surgical history, and problem list.    Compared to one year ago, the patient's physical   health is better.  Compared to one year ago, the patient's mental   health is better.    Recent Hospitalizations:  This patient has had a [facility] admission record on file within the last 365 days.  Current Medical Providers:  Patient Care Team:  Michell Ramirez APRN as PCP - General (Nurse Practitioner)  Anthony Cavanaugh RN as Ambulatory  ([facility])  Ghazal Daugherty MSW as  (Amb Case Mgmt) ([facility])    Outpatient Medications Prior to Visit   Medication Sig Dispense Refill    albuterol sulfate  (90 Base) MCG/ACT inhaler Inhale 2 puffs Every 4 (Four) Hours As Needed for Wheezing or Shortness of Air. 8 g 2    aspirin 81 MG EC tablet Take 1 tablet by mouth Daily.      budesonide-formoterol (SYMBICORT) 160-4.5 MCG/ACT inhaler INHALE 2 PUFFS BY MOUTH TWICE A DAY 10.2 g 2    buPROPion (WELLBUTRIN) 100 MG tablet TAKE 1 TABLET BY MOUTH 2 TIMES A  tablet 1    CBD (cannabidiol) oral oil Take 1 drop by mouth Daily As Needed.      cloNIDine (CATAPRES) 0.2 MG tablet TAKE 1 TABLET BY MOUTH DAILY 90 tablet 1    donepezil (ARICEPT) 10 MG tablet Take 1 tablet by mouth Every Night. 90 tablet 3    gabapentin (NEURONTIN) 300 MG capsule Take 1 capsule by mouth 3 (Three) Times a Day. 270 capsule 0    hydrOXYzine (ATARAX) 10 MG tablet TAKE 1 TABLET BY MOUTH DAILY 90 tablet 1    levothyroxine (SYNTHROID, LEVOTHROID) 112 MCG tablet Take 1 tablet by mouth Daily. 90 tablet 0    meclizine (ANTIVERT) 25 MG tablet Take 1 tablet by mouth  3 (Three) Times a Day As Needed for Dizziness. 90 tablet 0    memantine (NAMENDA) 10 MG tablet TAKE 1 TABLET BY MOUTH 2 TIMES A  tablet 1    metFORMIN (GLUCOPHAGE) 1000 MG tablet TAKE 1 TABLET BY MOUTH DAILY WITH BREAKFAST 90 tablet 1    omeprazole (priLOSEC) 40 MG capsule Take 1 capsule by mouth Daily. 90 capsule 1    pravastatin (PRAVACHOL) 20 MG tablet TAKE 1 TABLET BY MOUTH DAILY 90 tablet 1    ramipril (ALTACE) 2.5 MG capsule TAKE 2 CAPSULES BY MOUTH DAILY 180 capsule 1    rOPINIRole (REQUIP) 0.5 MG tablet TAKE 1 TABLET BY MOUTH EVERY NIGHT AT BEDTIME 1 HOUR BEFORE BEDTIME 90 tablet 1    venlafaxine XR (EFFEXOR-XR) 150 MG 24 hr capsule Take 1 capsule by mouth Daily. 180 capsule 1    vitamin B-12 (CYANOCOBALAMIN) 1000 MCG tablet Take 1 tablet by mouth Daily.      Vitamin D, Cholecalciferol, 50 MCG (2000 UT) capsule Take 1 capsule by mouth Daily.      ciprofloxacin (CIPRO) 250 MG tablet Take 1 tablet by mouth Daily.      clopidogrel (PLAVIX) 75 MG tablet Take 1 tablet by mouth Daily. 30 tablet 3    docusate sodium (Colace) 100 MG capsule Take 1 capsule by mouth 2 (Two) Times a Day. 60 capsule 2    ciprofloxacin (CIPRO) 250 MG tablet  (Patient not taking: Reported on 6/12/2025)       No facility-administered medications prior to visit.     No opioid medication identified on active medication list. I have reviewed chart for other potential  high risk medication/s and harmful drug interactions in the elderly.      Aspirin is on active medication list. Aspirin use is indicated based on review of current medical condition/s. Pros and cons of this therapy have been discussed today. Benefits of this medication outweigh potential harm.  Patient has been encouraged to continue taking this medication.  .      Patient Active Problem List   Diagnosis    B12 deficiency    Back pain, chronic    Depression    Diabetic peripheral neuropathy    Restless leg syndrome    Osteoporosis    OAB (overactive bladder)     "Lumbosacral radiculopathy    Insomnia    Hypothyroidism    HTN, goal below 130/80    Gastric reflux    Environmental and seasonal allergies    Dyslipidemia    DM type 2, goal HbA1c < 7%    Angina pectoris    SOB (shortness of breath)    Abnormal results of cardiovascular function studies    Dizziness    Cognitive impairment    Vitamin D deficiency    Diabetes mellitus    Primary hypertension    Anxiety    Neuropathy    Preventative health care    Coronary artery disease involving native heart without angina pectoris    Screening mammogram for breast cancer    Frequent falls    Bronchitis    Urinary tract infection without hematuria    Chronic obstructive pulmonary disease    Need for influenza vaccination    Anxiety and depression    Acute right-sided low back pain without sciatica    Encounter for subsequent annual wellness visit in Medicare patient    TIA (transient ischemic attack)    Asymptomatic menopause    Encounter for screening for osteoporosis    UTI (urinary tract infection)    MELODY (acute kidney injury)    Constipation    Recurrent UTI    Encounter for subsequent annual wellness visit (AWV) in Medicare patient    Postmenopause     Advance Care Planning Advance Directive is not on file.  ACP discussion was held with the patient during this visit. Patient does not have an advance directive, information provided.            Objective   Vitals:    06/12/25 0942   BP: 118/70   BP Location: Left arm   Patient Position: Sitting   Cuff Size: Adult   Pulse: 59   SpO2: 95%   Weight: 79.8 kg (176 lb)   Height: 165.1 cm (65\")   PainSc: 0-No pain       Estimated body mass index is 29.29 kg/m² as calculated from the following:    Height as of this encounter: 165.1 cm (65\").    Weight as of this encounter: 79.8 kg (176 lb).             Gait and Balance Evaluation:  Walker        Does the patient have evidence of cognitive impairment? Yes    ATTENTION  What is the year: correct  What is the month of the year: " correct  What is the day of the week?: correct  What is the date?: correct  MEMORY  Repeat address three times, only score third attempt: [address] [place], [place]: 5  HOW MANY ANIMALS DID THE PATIENT NAME  Verbal Fluency -- Animal Names (0-25): 17-21  CLOCK DRAWING  Clock Drawing: All Correct  MEMORY RECALL  Tell me what you remember about that name and address we were repeating at the beginnin  ACE TOTAL SCORE  Total ACE Score - <25/30 strongly suggests cognitive impairment; <21/30 almost certainly shows dementia: 27                                                                                                  Health  Risk Assessment    Smoking Status:  Social History     Tobacco Use   Smoking Status Former    Current packs/day: 0.00    Average packs/day: 2.0 packs/day for 20.0 years (40.0 ttl pk-yrs)    Types: Cigarettes    Start date: 1978    Quit date: 1998    Years since quittin.4   Smokeless Tobacco Never     Alcohol Consumption:  Social History     Substance and Sexual Activity   Alcohol Use Yes    Comment: socially       Fall Risk Screen  STEADI Fall Risk Assessment was completed, and patient is at HIGH risk for falls. Assessment completed on:2025    Depression Screening   Little interest or pleasure in doing things? Not at all   Feeling down, depressed, or hopeless? Not at all   PHQ-2 Total Score 0      Health Habits and Functional and Cognitive Screenin/10/2025    10:54 AM   Functional & Cognitive Status   Do you have difficulty preparing food and eating? No   Do you have difficulty bathing yourself, getting dressed or grooming yourself? No   Do you have difficulty using the toilet? No   Do you have difficulty moving around from place to place? No   Do you have trouble with steps or getting out of a bed or a chair? Yes   Current Diet Unhealthy Diet   Dental Exam Up to date   Eye Exam Up to date   Exercise (times per week) 0 times per week   Current  Exercises Include No Regular Exercise   Do you need help using the phone?  No   Are you deaf or do you have serious difficulty hearing?  No   Do you need help to go to places out of walking distance? Yes   Do you need help shopping? No   Do you need help preparing meals?  No   Do you need help with housework?  No   Do you need help with laundry? No   Do you need help taking your medications? Yes   Do you need help managing money? No   Do you ever drive or ride in a car without wearing a seat belt? No   Have you felt unusual stress, anger or loneliness in the last month? No   Who do you live with? Sibling   If you need help, do you have trouble finding someone available to you? No   Have you been bothered in the last four weeks by sexual problems? No   Do you have difficulty concentrating, remembering or making decisions? Yes           Age-appropriate Screening Schedule:  Refer to the list below for future screening recommendations based on patient's age, sex and/or medical conditions. Orders for these recommended tests are listed in the plan section. The patient has been provided with a written plan.    Health Maintenance List  Health Maintenance   Topic Date Due    DIABETIC FOOT EXAM  Never done    URINE MICROALBUMIN-CREATININE RATIO (uACR)  Never done    ZOSTER VACCINE (1 of 2) Never done    DXA SCAN  04/27/2024    COVID-19 Vaccine (4 - 2024-25 season) 09/01/2024    RSV Vaccine - Adults (1 - 1-dose 75+ series) Never done    HEMOGLOBIN A1C  09/11/2025    INFLUENZA VACCINE  07/01/2025    DIABETIC EYE EXAM  02/03/2026    ANNUAL WELLNESS VISIT  06/12/2026    TDAP/TD VACCINES (3 - Td or Tdap) 06/13/2028    COLORECTAL CANCER SCREENING  01/26/2035    HEPATITIS C SCREENING  Completed    Pneumococcal Vaccine 50+  Completed    MAMMOGRAM  Discontinued                                                                                                                                                CMS Preventative Services Quick  "Reference  Risk Factors Identified During Encounter  Fall Risk-High or Moderate: Discussed Fall Prevention in the home  Immunizations Discussed/Encouraged: Tdap, Influenza, Prevnar 20 (Pneumococcal 20-valent conjugate), Shingrix, and RSV (Respiratory Syncytial Virus)  Dental Screening Recommended  Vision Screening Recommended    The above risks/problems have been discussed with the patient.  Pertinent information has been shared with the patient in the After Visit Summary.  An After Visit Summary and PPPS were made available to the patient.    Follow Up:   Next Medicare Wellness visit to be scheduled in 1 year.         Additional E&M Note during same encounter follows:  Patient has additional, significant, and separately identifiable condition(s)/problem(s) that require work above and beyond the Medicare Wellness Visit     Chief Complaint  Medicare Wellness-subsequent and Back Pain (Lower back pain at times with radiation down left leg )    Subjective   HPI  Radha is also being seen today for additional medical problem/s. Patient is c/o low back pain that radiates into left lower extremity. Sx worse after prolonged standing or ambulation. Patient denies numbness to her leg.     Review of Systems   Respiratory:  Negative for cough and shortness of breath.    Cardiovascular:  Negative for chest pain and leg swelling.   Gastrointestinal:  Negative for constipation and diarrhea.   Genitourinary:  Negative for dysuria.   Musculoskeletal:  Positive for back pain.   Skin:  Negative for rash.   Neurological:  Negative for dizziness.          Objective   Vital Signs:  /70 (BP Location: Left arm, Patient Position: Sitting, Cuff Size: Adult)   Pulse 59   Ht 165.1 cm (65\")   Wt 79.8 kg (176 lb)   SpO2 95%   BMI 29.29 kg/m²     Physical Exam  Constitutional:       Appearance: Normal appearance.   HENT:      Head: Normocephalic.   Cardiovascular:      Rate and Rhythm: Normal rate and regular rhythm.   Pulmonary:    "   Effort: Pulmonary effort is normal.      Breath sounds: Normal breath sounds.   Abdominal:      General: Abdomen is flat. Bowel sounds are normal.      Palpations: Abdomen is soft.   Musculoskeletal:         General: Normal range of motion.      Cervical back: Neck supple.        Back:       Right lower leg: No edema.      Left lower leg: No edema.   Skin:     General: Skin is warm and dry.   Neurological:      Mental Status: She is alert and oriented to person, place, and time.      Gait: Gait is intact.   Psychiatric:         Attention and Perception: Attention normal.         Mood and Affect: Mood normal.         Speech: Speech normal.             CMP          3/11/2025    14:29 5/18/2025    17:15 5/22/2025    19:03   CMP   Glucose 87  153  164    BUN 13  19  20    Creatinine 0.87  1.81  1.11    EGFR 69.6  28.9  51.9    Sodium 142  137  138    Potassium 4.3  4.2  4.3    Chloride 101  100  103    Calcium 8.9  8.9  8.9    Total Protein 6.9  6.7  6.9    Albumin 3.7  4.1  4.2    Globulin 3.2  2.6  2.7    Total Bilirubin <0.2  0.2  <0.2    Alkaline Phosphatase 100  98  100    AST (SGOT) 16  16  18    ALT (SGPT) 11  12  12    Albumin/Globulin Ratio 1.2  1.6  1.6    BUN/Creatinine Ratio 14.9  10.5  18.0    Anion Gap 12.0  13.9  9.9      CBC          3/11/2025    14:29 5/18/2025    17:15 5/22/2025    19:03   CBC   WBC 7.25  10.17  8.15    RBC 3.73  3.93  3.91    Hemoglobin 11.0  11.2  11.2    Hematocrit 34.7  36.0  36.0    MCV 93.0  91.6  92.1    MCH 29.5  28.5  28.6    MCHC 31.7  31.1  31.1    RDW 13.2  13.0  12.9    Platelets 332  288  270      Lipid Panel          7/5/2024    14:38   Lipid Panel   Total Cholesterol 175    Triglycerides 126    HDL Cholesterol 95    VLDL Cholesterol 21    LDL Cholesterol  59    LDL/HDL Ratio 0.58      TSH          7/5/2024    14:38 3/11/2025    14:29 5/22/2025    19:03   TSH   TSH 0.741  17.000  11.200      Most Recent A1C          3/11/2025    14:29   HGBA1C Most Recent   Hemoglobin  A1C 6.80           Assessment and Plan      Constipation, unspecified constipation type    Orders:    docusate sodium (Colace) 100 MG capsule; Take 1 capsule by mouth 2 (Two) Times a Day.    Encounter for subsequent annual wellness visit (AWV) in Medicare patient    Orders:    CBC (No Diff)    Basic Metabolic Panel    Acquired hypothyroidism    Orders:    TSH    T4, Free    CBC (No Diff)    Basic Metabolic Panel    Type 2 diabetes mellitus with other specified complication, without long-term current use of insulin  Diabetes is stable.   Continue current treatment regimen.  Recommended an ADA diet.  Regular aerobic exercise.  Reminded to get yearly retinal exam.  Diabetes will be reassessed in 4 months    Orders:    Microalbumin / Creatinine Urine Ratio - Urine, Clean Catch    Hemoglobin A1c    Chronic obstructive pulmonary disease, unspecified COPD type  COPD is stable.    Plan:  Continue same medication/s without change.    Discussed medication dosage, use, side effects, and goals of treatment in detail.    Warning signs of respiratory distress were reviewed with the patient. .    Patient Treatment Goals:   symptom prevention, minimizing limitation in activity, prevention of exacerbations and use of ER/inpatient care, maintenance of optimal pulmonary function, and minimization of adverse effects of treatment    Followup at the next regular appointment.         Restless leg syndrome  Stable on Requip 0.5 mg nightly       Primary hypertension  Hypertension is stable and controlled  Continue current treatment regimen.  Ambulatory blood pressure monitoring.  Blood pressure will be reassessed in 6 months.         Anxiety and depression  Patient's depression is a recurrent episode that is mild without psychosis. Depression is in partial remission and stable.    Plan:   Continue current medication therapy     Followup at the next regular appointment.          Cognitive impairment  MMSE reviewed  Continue Kumar and  Aricept       Postmenopause    Orders:    DEXA Bone Density Axial; Future    Recurrent UTI    Orders:    POC Urinalysis Dipstick    Chronic left-sided low back pain with left-sided sciatica  Exercises provided  Consider PT    Orders:    Ibuprofen 3 %, Gabapentin 10 %, Baclofen 2 %, lidocaine 4 %; Apply 1-2 g topically to the appropriate area as directed 3 (Three) to 4 (Four) times daily.          I spent 35 minutes caring for Radha on this date of service. This time includes time spent by me in the following activities:preparing for the visit, reviewing tests, obtaining and/or reviewing a separately obtained history, performing a medically appropriate examination and/or evaluation , counseling and educating the patient/family/caregiver, ordering medications, tests, or procedures, documenting information in the medical record, and care coordination  Follow Up   Return in about 4 months (around 10/12/2025) for DMII.  Patient was given instructions and counseling regarding her condition or for health maintenance advice. Please see specific information pulled into the AVS if appropriate.

## 2025-06-12 NOTE — ASSESSMENT & PLAN NOTE
Exercises provided  Consider PT    Orders:    Ibuprofen 3 %, Gabapentin 10 %, Baclofen 2 %, lidocaine 4 %; Apply 1-2 g topically to the appropriate area as directed 3 (Three) to 4 (Four) times daily.

## 2025-06-12 NOTE — ASSESSMENT & PLAN NOTE
Hypertension is stable and controlled  Continue current treatment regimen.  Ambulatory blood pressure monitoring.  Blood pressure will be reassessed in 6 months.

## 2025-06-12 NOTE — ASSESSMENT & PLAN NOTE
Orders:    docusate sodium (Colace) 100 MG capsule; Take 1 capsule by mouth 2 (Two) Times a Day.

## 2025-06-12 NOTE — ASSESSMENT & PLAN NOTE
COPD is stable.    Plan:  Continue same medication/s without change.    Discussed medication dosage, use, side effects, and goals of treatment in detail.    Warning signs of respiratory distress were reviewed with the patient. .    Patient Treatment Goals:   symptom prevention, minimizing limitation in activity, prevention of exacerbations and use of ER/inpatient care, maintenance of optimal pulmonary function, and minimization of adverse effects of treatment    Followup at the next regular appointment.

## 2025-06-12 NOTE — ASSESSMENT & PLAN NOTE
Diabetes is stable.   Continue current treatment regimen.  Recommended an ADA diet.  Regular aerobic exercise.  Reminded to get yearly retinal exam.  Diabetes will be reassessed in 4 months    Orders:    Microalbumin / Creatinine Urine Ratio - Urine, Clean Catch    Hemoglobin A1c

## 2025-06-13 ENCOUNTER — RESULTS FOLLOW-UP (OUTPATIENT)
Dept: FAMILY MEDICINE CLINIC | Facility: CLINIC | Age: 75
End: 2025-06-13
Payer: MEDICARE

## 2025-06-13 DIAGNOSIS — D64.9 ANEMIA, UNSPECIFIED TYPE: Primary | ICD-10-CM

## 2025-06-13 LAB
ALBUMIN UR-MCNC: 2.8 MG/DL
ANION GAP SERPL CALCULATED.3IONS-SCNC: 10.6 MMOL/L (ref 5–15)
BUN SERPL-MCNC: 18 MG/DL (ref 8–23)
BUN/CREAT SERPL: 18.6 (ref 7–25)
CALCIUM SPEC-SCNC: 9 MG/DL (ref 8.6–10.5)
CHLORIDE SERPL-SCNC: 101 MMOL/L (ref 98–107)
CO2 SERPL-SCNC: 28.4 MMOL/L (ref 22–29)
CREAT SERPL-MCNC: 0.97 MG/DL (ref 0.57–1)
CREAT UR-MCNC: 139 MG/DL
DEPRECATED RDW RBC AUTO: 41.4 FL (ref 37–54)
EGFRCR SERPLBLD CKD-EPI 2021: 61.1 ML/MIN/1.73
ERYTHROCYTE [DISTWIDTH] IN BLOOD BY AUTOMATED COUNT: 12.7 % (ref 12.3–15.4)
GLUCOSE SERPL-MCNC: 127 MG/DL (ref 65–99)
HBA1C MFR BLD: 6.4 % (ref 4.8–5.6)
HCT VFR BLD AUTO: 30.5 % (ref 34–46.6)
HGB BLD-MCNC: 9.7 G/DL (ref 12–15.9)
MCH RBC QN AUTO: 28.9 PG (ref 26.6–33)
MCHC RBC AUTO-ENTMCNC: 31.8 G/DL (ref 31.5–35.7)
MCV RBC AUTO: 90.8 FL (ref 79–97)
MICROALBUMIN/CREAT UR: 20.1 MG/G (ref 0–29)
PLATELET # BLD AUTO: 271 10*3/MM3 (ref 140–450)
PMV BLD AUTO: 9.7 FL (ref 6–12)
POTASSIUM SERPL-SCNC: 4 MMOL/L (ref 3.5–5.2)
RBC # BLD AUTO: 3.36 10*6/MM3 (ref 3.77–5.28)
SODIUM SERPL-SCNC: 140 MMOL/L (ref 136–145)
T4 FREE SERPL-MCNC: 1.21 NG/DL (ref 0.92–1.68)
TSH SERPL DL<=0.05 MIU/L-ACNC: 6.8 UIU/ML (ref 0.27–4.2)
WBC NRBC COR # BLD AUTO: 6.91 10*3/MM3 (ref 3.4–10.8)

## 2025-06-13 RX ORDER — FERROUS SULFATE 325(65) MG
325 TABLET ORAL
Qty: 90 TABLET | Refills: 1 | Status: SHIPPED | OUTPATIENT
Start: 2025-06-13

## 2025-06-23 ENCOUNTER — PATIENT MESSAGE (OUTPATIENT)
Dept: FAMILY MEDICINE CLINIC | Facility: CLINIC | Age: 75
End: 2025-06-23
Payer: MEDICARE

## 2025-06-30 ENCOUNTER — PATIENT OUTREACH (OUTPATIENT)
Age: 75
End: 2025-06-30
Payer: MEDICARE

## 2025-06-30 NOTE — OUTREACH NOTE
Care Coordination    Per edis review, no additional needs noted in the past 30 days. SW to discharge. Please re consult SW if additional needs arise.       Ghazal BRUCE -   Ambulatory Case Management    6/30/2025, 09:08 EDT

## 2025-07-09 RX ORDER — MEMANTINE HYDROCHLORIDE 10 MG/1
10 TABLET ORAL 2 TIMES DAILY
Qty: 180 TABLET | Refills: 1 | OUTPATIENT
Start: 2025-07-09

## 2025-07-15 ENCOUNTER — TELEPHONE (OUTPATIENT)
Dept: FAMILY MEDICINE CLINIC | Facility: CLINIC | Age: 75
End: 2025-07-15
Payer: MEDICARE

## 2025-07-15 NOTE — TELEPHONE ENCOUNTER
Attempted to call pt needing to reschedule 10/15 appt, no answer: per verbal left msg for pt to call office to reschedule.

## 2025-08-01 DIAGNOSIS — F41.9 ANXIETY: ICD-10-CM

## 2025-08-01 RX ORDER — HYDROXYZINE HYDROCHLORIDE 10 MG/1
10 TABLET, FILM COATED ORAL DAILY
Qty: 90 TABLET | Refills: 1 | Status: SHIPPED | OUTPATIENT
Start: 2025-08-01

## 2025-08-01 RX ORDER — BUPROPION HYDROCHLORIDE 100 MG/1
100 TABLET ORAL 2 TIMES DAILY
Qty: 180 TABLET | Refills: 1 | Status: SHIPPED | OUTPATIENT
Start: 2025-08-01

## 2025-08-01 RX ORDER — GABAPENTIN 300 MG/1
300 CAPSULE ORAL 3 TIMES DAILY
Qty: 270 CAPSULE | Refills: 0 | Status: SHIPPED | OUTPATIENT
Start: 2025-08-01

## 2025-08-01 RX ORDER — ALBUTEROL SULFATE 90 UG/1
2 INHALANT RESPIRATORY (INHALATION) EVERY 4 HOURS PRN
Qty: 8.5 G | Refills: 2 | Status: SHIPPED | OUTPATIENT
Start: 2025-08-01

## 2025-08-01 RX ORDER — VENLAFAXINE HYDROCHLORIDE 150 MG/1
150 CAPSULE, EXTENDED RELEASE ORAL DAILY
Qty: 90 CAPSULE | Refills: 1 | Status: SHIPPED | OUTPATIENT
Start: 2025-08-01

## 2025-08-08 ENCOUNTER — PATIENT MESSAGE (OUTPATIENT)
Dept: FAMILY MEDICINE CLINIC | Facility: CLINIC | Age: 75
End: 2025-08-08
Payer: MEDICARE

## (undated) DEVICE — ST ACC MICROPUNCTURE STFF/CANN PLAT/TP 4F 21G 40CM

## (undated) DEVICE — CATH DIAG IMPULSE FL4 6F 100CM

## (undated) DEVICE — PINNACLE INTRODUCER SHEATH: Brand: PINNACLE

## (undated) DEVICE — CATH DIAG IMPULSE PIG .056 6F 110CM

## (undated) DEVICE — GW PTFE EMERALD HEPCOAT FC J TIP STD .035 3MM 150CM

## (undated) DEVICE — PK TRY HEART CATH 50

## (undated) DEVICE — CATH DIAG IMPULSE FR4 6F 100CM

## (undated) DEVICE — ANGIO-SEAL VIP VASCULAR CLOSURE DEVICE: Brand: ANGIO-SEAL